# Patient Record
Sex: FEMALE | Race: ASIAN | NOT HISPANIC OR LATINO | URBAN - METROPOLITAN AREA
[De-identification: names, ages, dates, MRNs, and addresses within clinical notes are randomized per-mention and may not be internally consistent; named-entity substitution may affect disease eponyms.]

---

## 2024-01-01 ENCOUNTER — OUTPATIENT (OUTPATIENT)
Dept: OUTPATIENT SERVICES | Facility: HOSPITAL | Age: 64
LOS: 1 days | Discharge: HOME | End: 2024-01-01
Payer: MEDICAID

## 2024-01-01 DIAGNOSIS — Z98.890 OTHER SPECIFIED POSTPROCEDURAL STATES: Chronic | ICD-10-CM

## 2024-01-01 DIAGNOSIS — Z90.710 ACQUIRED ABSENCE OF BOTH CERVIX AND UTERUS: Chronic | ICD-10-CM

## 2024-01-01 DIAGNOSIS — Z87.81 PERSONAL HISTORY OF (HEALED) TRAUMATIC FRACTURE: Chronic | ICD-10-CM

## 2024-01-01 DIAGNOSIS — C34.90 MALIGNANT NEOPLASM OF UNSPECIFIED PART OF UNSPECIFIED BRONCHUS OR LUNG: ICD-10-CM

## 2024-01-01 DIAGNOSIS — Z98.891 HISTORY OF UTERINE SCAR FROM PREVIOUS SURGERY: Chronic | ICD-10-CM

## 2024-01-01 DIAGNOSIS — Z90.721 ACQUIRED ABSENCE OF OVARIES, UNILATERAL: Chronic | ICD-10-CM

## 2024-01-01 LAB
BASOPHILS # BLD AUTO: 0.02 K/UL — SIGNIFICANT CHANGE UP (ref 0–0.2)
BASOPHILS NFR BLD AUTO: 0.5 % — SIGNIFICANT CHANGE UP (ref 0–2)
EOSINOPHIL # BLD AUTO: 0.22 K/UL — SIGNIFICANT CHANGE UP (ref 0–0.5)
EOSINOPHIL NFR BLD AUTO: 5.3 % — SIGNIFICANT CHANGE UP (ref 0–6)
HCT VFR BLD CALC: 36.8 % — SIGNIFICANT CHANGE UP (ref 34.5–45)
HGB BLD-MCNC: 11.8 G/DL — SIGNIFICANT CHANGE UP (ref 11.5–15.5)
IMM GRANULOCYTES NFR BLD AUTO: 0.2 % — SIGNIFICANT CHANGE UP (ref 0–0.9)
LYMPHOCYTES # BLD AUTO: 1.37 K/UL — SIGNIFICANT CHANGE UP (ref 1–3.3)
LYMPHOCYTES # BLD AUTO: 32.9 % — SIGNIFICANT CHANGE UP (ref 13–44)
MCHC RBC-ENTMCNC: 27.3 PG — SIGNIFICANT CHANGE UP (ref 27–34)
MCHC RBC-ENTMCNC: 32.1 G/DL — SIGNIFICANT CHANGE UP (ref 32–36)
MCV RBC AUTO: 85.2 FL — SIGNIFICANT CHANGE UP (ref 80–100)
MONOCYTES # BLD AUTO: 0.53 K/UL — SIGNIFICANT CHANGE UP (ref 0–0.9)
MONOCYTES NFR BLD AUTO: 12.7 % — SIGNIFICANT CHANGE UP (ref 2–14)
NEUTROPHILS # BLD AUTO: 2.02 K/UL — SIGNIFICANT CHANGE UP (ref 1.8–7.4)
NEUTROPHILS NFR BLD AUTO: 48.4 % — SIGNIFICANT CHANGE UP (ref 43–77)
NRBC # BLD: 0 /100 WBCS — SIGNIFICANT CHANGE UP (ref 0–0)
NRBC BLD-RTO: 0 /100 WBCS — SIGNIFICANT CHANGE UP (ref 0–0)
PLATELET # BLD AUTO: 178 K/UL — SIGNIFICANT CHANGE UP (ref 150–400)
RBC # BLD: 4.32 M/UL — SIGNIFICANT CHANGE UP (ref 3.8–5.2)
RBC # FLD: 12.7 % — SIGNIFICANT CHANGE UP (ref 10.3–14.5)
WBC # BLD: 4.17 K/UL — SIGNIFICANT CHANGE UP (ref 3.8–10.5)
WBC # FLD AUTO: 4.17 K/UL — SIGNIFICANT CHANGE UP (ref 3.8–10.5)

## 2024-01-01 PROCEDURE — 77290 THER RAD SIMULAJ FIELD CPLX: CPT | Mod: 26

## 2024-01-01 PROCEDURE — 77263 THER RADIOLOGY TX PLNG CPLX: CPT

## 2024-01-01 PROCEDURE — 77334 RADIATION TREATMENT AID(S): CPT | Mod: 26

## 2024-01-01 PROCEDURE — 77280 THER RAD SIMULAJ FIELD SMPL: CPT | Mod: 26

## 2024-01-01 PROCEDURE — 77307 TELETHX ISODOSE PLAN CPLX: CPT | Mod: 26

## 2024-01-01 PROCEDURE — 93010 ELECTROCARDIOGRAM REPORT: CPT

## 2024-01-01 PROCEDURE — 77427 RADIATION TX MANAGEMENT X5: CPT

## 2024-03-21 ENCOUNTER — OUTPATIENT (OUTPATIENT)
Dept: OUTPATIENT SERVICES | Facility: HOSPITAL | Age: 64
LOS: 1 days | Discharge: HOME | End: 2024-03-21
Payer: MEDICAID

## 2024-03-21 DIAGNOSIS — Z98.890 OTHER SPECIFIED POSTPROCEDURAL STATES: Chronic | ICD-10-CM

## 2024-03-21 DIAGNOSIS — Z90.721 ACQUIRED ABSENCE OF OVARIES, UNILATERAL: Chronic | ICD-10-CM

## 2024-03-21 DIAGNOSIS — Z87.81 PERSONAL HISTORY OF (HEALED) TRAUMATIC FRACTURE: Chronic | ICD-10-CM

## 2024-03-22 PROCEDURE — 77334 RADIATION TREATMENT AID(S): CPT | Mod: 26

## 2024-03-22 PROCEDURE — 77290 THER RAD SIMULAJ FIELD CPLX: CPT | Mod: 26

## 2024-03-25 PROCEDURE — 77321 SPECIAL TELETX PORT PLAN: CPT | Mod: 26

## 2024-03-25 PROCEDURE — 77427 RADIATION TX MANAGEMENT X5: CPT

## 2024-03-25 PROCEDURE — 77280 THER RAD SIMULAJ FIELD SMPL: CPT | Mod: 26

## 2024-03-25 PROCEDURE — 77332 RADIATION TREATMENT AID(S): CPT | Mod: 26,59

## 2024-03-25 PROCEDURE — 77334 RADIATION TREATMENT AID(S): CPT | Mod: 26

## 2024-03-29 PROCEDURE — 77427 RADIATION TX MANAGEMENT X5: CPT

## 2024-05-22 PROCEDURE — 77290 THER RAD SIMULAJ FIELD CPLX: CPT | Mod: 26

## 2024-05-22 PROCEDURE — 77263 THER RADIOLOGY TX PLNG CPLX: CPT

## 2024-05-22 PROCEDURE — 77334 RADIATION TREATMENT AID(S): CPT | Mod: 26

## 2024-05-24 PROCEDURE — 77332 RADIATION TREATMENT AID(S): CPT | Mod: 26

## 2024-05-24 PROCEDURE — 77306 TELETHX ISODOSE PLAN SIMPLE: CPT | Mod: 26

## 2024-05-30 PROCEDURE — 77280 THER RAD SIMULAJ FIELD SMPL: CPT | Mod: 26

## 2024-08-06 ENCOUNTER — OUTPATIENT (OUTPATIENT)
Dept: OUTPATIENT SERVICES | Facility: HOSPITAL | Age: 64
LOS: 1 days | Discharge: HOME | End: 2024-08-06

## 2024-08-06 DIAGNOSIS — Z87.81 PERSONAL HISTORY OF (HEALED) TRAUMATIC FRACTURE: Chronic | ICD-10-CM

## 2024-08-06 DIAGNOSIS — Z98.890 OTHER SPECIFIED POSTPROCEDURAL STATES: Chronic | ICD-10-CM

## 2024-08-06 DIAGNOSIS — C34.90 MALIGNANT NEOPLASM OF UNSPECIFIED PART OF UNSPECIFIED BRONCHUS OR LUNG: ICD-10-CM

## 2024-08-06 DIAGNOSIS — Z90.721 ACQUIRED ABSENCE OF OVARIES, UNILATERAL: Chronic | ICD-10-CM

## 2024-08-07 PROBLEM — R91.8 OPACITY OF LUNG ON IMAGING STUDY: Status: ACTIVE | Noted: 2024-08-07

## 2024-08-26 ENCOUNTER — OUTPATIENT (OUTPATIENT)
Dept: OUTPATIENT SERVICES | Facility: HOSPITAL | Age: 64
LOS: 1 days | End: 2024-08-26
Payer: MEDICAID

## 2024-08-26 DIAGNOSIS — Z98.890 OTHER SPECIFIED POSTPROCEDURAL STATES: Chronic | ICD-10-CM

## 2024-08-26 DIAGNOSIS — C79.31 SECONDARY MALIGNANT NEOPLASM OF BRAIN: ICD-10-CM

## 2024-08-26 DIAGNOSIS — Z90.710 ACQUIRED ABSENCE OF BOTH CERVIX AND UTERUS: Chronic | ICD-10-CM

## 2024-08-26 DIAGNOSIS — Z98.891 HISTORY OF UTERINE SCAR FROM PREVIOUS SURGERY: Chronic | ICD-10-CM

## 2024-08-26 DIAGNOSIS — Z87.81 PERSONAL HISTORY OF (HEALED) TRAUMATIC FRACTURE: Chronic | ICD-10-CM

## 2024-08-26 DIAGNOSIS — Z90.721 ACQUIRED ABSENCE OF OVARIES, UNILATERAL: Chronic | ICD-10-CM

## 2024-08-26 DIAGNOSIS — Z00.8 ENCOUNTER FOR OTHER GENERAL EXAMINATION: ICD-10-CM

## 2024-08-26 PROCEDURE — 70553 MRI BRAIN STEM W/O & W/DYE: CPT

## 2024-08-26 PROCEDURE — A9585: CPT

## 2024-09-03 PROCEDURE — 77333 RADIATION TREATMENT AID(S): CPT | Mod: 26

## 2024-09-03 PROCEDURE — 77290 THER RAD SIMULAJ FIELD CPLX: CPT | Mod: 26

## 2024-09-09 PROCEDURE — 77334 RADIATION TREATMENT AID(S): CPT | Mod: 26

## 2024-09-09 PROCEDURE — 77307 TELETHX ISODOSE PLAN CPLX: CPT | Mod: 26

## 2024-09-10 PROCEDURE — 77332 RADIATION TREATMENT AID(S): CPT | Mod: 26

## 2024-09-10 PROCEDURE — 77280 THER RAD SIMULAJ FIELD SMPL: CPT | Mod: 26

## 2024-09-26 LAB
BASOPHILS # BLD AUTO: 0.02 K/UL — SIGNIFICANT CHANGE UP (ref 0–0.2)
BASOPHILS NFR BLD AUTO: 0.4 % — SIGNIFICANT CHANGE UP (ref 0–2)
EOSINOPHIL # BLD AUTO: 0.32 K/UL — SIGNIFICANT CHANGE UP (ref 0–0.5)
EOSINOPHIL NFR BLD AUTO: 5.9 % — SIGNIFICANT CHANGE UP (ref 0–6)
ERYTHROCYTE [SEDIMENTATION RATE] IN BLOOD: 67 MM/HR — HIGH (ref 0–20)
HCT VFR BLD CALC: 35.2 % — SIGNIFICANT CHANGE UP (ref 34.5–45)
HGB BLD-MCNC: 11.3 G/DL — LOW (ref 11.5–15.5)
IMM GRANULOCYTES NFR BLD AUTO: 0 % — SIGNIFICANT CHANGE UP (ref 0–0.9)
LYMPHOCYTES # BLD AUTO: 1.32 K/UL — SIGNIFICANT CHANGE UP (ref 1–3.3)
LYMPHOCYTES # BLD AUTO: 24.4 % — SIGNIFICANT CHANGE UP (ref 13–44)
MCHC RBC-ENTMCNC: 29 PG — SIGNIFICANT CHANGE UP (ref 27–34)
MCHC RBC-ENTMCNC: 32.1 G/DL — SIGNIFICANT CHANGE UP (ref 32–36)
MCV RBC AUTO: 89.5 FL — SIGNIFICANT CHANGE UP (ref 80–100)
MONOCYTES # BLD AUTO: 0.56 K/UL — SIGNIFICANT CHANGE UP (ref 0–0.9)
MONOCYTES NFR BLD AUTO: 10.4 % — SIGNIFICANT CHANGE UP (ref 2–14)
NEUTROPHILS # BLD AUTO: 3.18 K/UL — SIGNIFICANT CHANGE UP (ref 1.8–7.4)
NEUTROPHILS NFR BLD AUTO: 58.9 % — SIGNIFICANT CHANGE UP (ref 43–77)
NRBC # BLD: 0 /100 WBCS — SIGNIFICANT CHANGE UP (ref 0–0)
PLATELET # BLD AUTO: 269 K/UL — SIGNIFICANT CHANGE UP (ref 150–400)
RBC # BLD: 3.9 M/UL — SIGNIFICANT CHANGE UP (ref 3.8–5.2)
RBC # FLD: 12.5 % — SIGNIFICANT CHANGE UP (ref 10.3–14.5)
WBC # BLD: 5.4 K/UL — SIGNIFICANT CHANGE UP (ref 3.8–10.5)
WBC # FLD AUTO: 5.4 K/UL — SIGNIFICANT CHANGE UP (ref 3.8–10.5)

## 2024-10-01 ENCOUNTER — OUTPATIENT (OUTPATIENT)
Dept: OUTPATIENT SERVICES | Facility: HOSPITAL | Age: 64
LOS: 1 days | End: 2024-10-01
Payer: MEDICAID

## 2024-10-01 DIAGNOSIS — Z00.8 ENCOUNTER FOR OTHER GENERAL EXAMINATION: ICD-10-CM

## 2024-10-01 DIAGNOSIS — Z87.81 PERSONAL HISTORY OF (HEALED) TRAUMATIC FRACTURE: Chronic | ICD-10-CM

## 2024-10-01 DIAGNOSIS — Z98.891 HISTORY OF UTERINE SCAR FROM PREVIOUS SURGERY: Chronic | ICD-10-CM

## 2024-10-01 DIAGNOSIS — Z90.710 ACQUIRED ABSENCE OF BOTH CERVIX AND UTERUS: Chronic | ICD-10-CM

## 2024-10-01 DIAGNOSIS — C7A.8 OTHER MALIGNANT NEUROENDOCRINE TUMORS: ICD-10-CM

## 2024-10-01 PROCEDURE — 73223 MRI JOINT UPR EXTR W/O&W/DYE: CPT

## 2024-10-01 PROCEDURE — A9585: CPT

## 2024-10-02 DIAGNOSIS — D3A.090 BENIGN CARCINOID TUMOR OF THE BRONCHUS AND LUNG: ICD-10-CM

## 2024-10-10 PROBLEM — M89.9 HUMERUS LESION, LEFT: Status: ACTIVE | Noted: 2024-10-10

## 2024-10-10 PROBLEM — M89.9 BONE LESION: Status: ACTIVE | Noted: 2024-10-10

## 2024-10-11 ENCOUNTER — APPOINTMENT (OUTPATIENT)
Dept: ORTHOPEDIC SURGERY | Facility: CLINIC | Age: 64
End: 2024-10-11
Payer: MEDICAID

## 2024-10-11 DIAGNOSIS — M89.9 DISORDER OF BONE, UNSPECIFIED: ICD-10-CM

## 2024-10-11 PROCEDURE — 73060 X-RAY EXAM OF HUMERUS: CPT

## 2024-10-11 PROCEDURE — 99203 OFFICE O/P NEW LOW 30 MIN: CPT

## 2024-10-18 ENCOUNTER — NON-APPOINTMENT (OUTPATIENT)
Age: 64
End: 2024-10-18

## 2024-10-19 ENCOUNTER — EMERGENCY (EMERGENCY)
Facility: HOSPITAL | Age: 64
LOS: 1 days | Discharge: HOME | End: 2024-10-19
Attending: EMERGENCY MEDICINE | Admitting: EMERGENCY MEDICINE
Payer: MEDICAID

## 2024-10-19 VITALS
RESPIRATION RATE: 17 BRPM | TEMPERATURE: 99 F | DIASTOLIC BLOOD PRESSURE: 74 MMHG | HEART RATE: 82 BPM | SYSTOLIC BLOOD PRESSURE: 137 MMHG | OXYGEN SATURATION: 100 %

## 2024-10-19 VITALS
SYSTOLIC BLOOD PRESSURE: 119 MMHG | WEIGHT: 100.09 LBS | HEART RATE: 83 BPM | DIASTOLIC BLOOD PRESSURE: 75 MMHG | TEMPERATURE: 98 F | HEIGHT: 57 IN | RESPIRATION RATE: 18 BRPM | OXYGEN SATURATION: 96 %

## 2024-10-19 DIAGNOSIS — Z90.710 ACQUIRED ABSENCE OF BOTH CERVIX AND UTERUS: Chronic | ICD-10-CM

## 2024-10-19 DIAGNOSIS — Z87.81 PERSONAL HISTORY OF (HEALED) TRAUMATIC FRACTURE: Chronic | ICD-10-CM

## 2024-10-19 DIAGNOSIS — Z98.891 HISTORY OF UTERINE SCAR FROM PREVIOUS SURGERY: Chronic | ICD-10-CM

## 2024-10-19 DIAGNOSIS — Z90.721 ACQUIRED ABSENCE OF OVARIES, UNILATERAL: Chronic | ICD-10-CM

## 2024-10-19 DIAGNOSIS — Z98.890 OTHER SPECIFIED POSTPROCEDURAL STATES: Chronic | ICD-10-CM

## 2024-10-19 LAB
ALBUMIN SERPL ELPH-MCNC: 3.7 G/DL — SIGNIFICANT CHANGE UP (ref 3.3–5)
ALP SERPL-CCNC: 79 U/L — SIGNIFICANT CHANGE UP (ref 40–120)
ALT FLD-CCNC: 13 U/L — SIGNIFICANT CHANGE UP (ref 4–33)
ANION GAP SERPL CALC-SCNC: 15 MMOL/L — HIGH (ref 7–14)
APPEARANCE UR: CLEAR — SIGNIFICANT CHANGE UP
AST SERPL-CCNC: 31 U/L — SIGNIFICANT CHANGE UP (ref 4–32)
BACTERIA # UR AUTO: NEGATIVE /HPF — SIGNIFICANT CHANGE UP
BASOPHILS # BLD AUTO: 0.03 K/UL — SIGNIFICANT CHANGE UP (ref 0–0.2)
BASOPHILS NFR BLD AUTO: 0.4 % — SIGNIFICANT CHANGE UP (ref 0–2)
BILIRUB SERPL-MCNC: 0.3 MG/DL — SIGNIFICANT CHANGE UP (ref 0.2–1.2)
BILIRUB UR-MCNC: NEGATIVE — SIGNIFICANT CHANGE UP
BUN SERPL-MCNC: 6 MG/DL — LOW (ref 7–23)
CALCIUM SERPL-MCNC: 9.1 MG/DL — SIGNIFICANT CHANGE UP (ref 8.4–10.5)
CAST: 0 /LPF — SIGNIFICANT CHANGE UP (ref 0–4)
CHLORIDE SERPL-SCNC: 101 MMOL/L — SIGNIFICANT CHANGE UP (ref 98–107)
CO2 SERPL-SCNC: 23 MMOL/L — SIGNIFICANT CHANGE UP (ref 22–31)
COLOR SPEC: SIGNIFICANT CHANGE UP
CREAT SERPL-MCNC: 0.49 MG/DL — LOW (ref 0.5–1.3)
DIFF PNL FLD: ABNORMAL
EGFR: 105 ML/MIN/1.73M2 — SIGNIFICANT CHANGE UP
EOSINOPHIL # BLD AUTO: 0.3 K/UL — SIGNIFICANT CHANGE UP (ref 0–0.5)
EOSINOPHIL NFR BLD AUTO: 4.3 % — SIGNIFICANT CHANGE UP (ref 0–6)
GLUCOSE SERPL-MCNC: 178 MG/DL — HIGH (ref 70–99)
GLUCOSE UR QL: NEGATIVE MG/DL — SIGNIFICANT CHANGE UP
HCT VFR BLD CALC: 37.4 % — SIGNIFICANT CHANGE UP (ref 34.5–45)
HGB BLD-MCNC: 12.1 G/DL — SIGNIFICANT CHANGE UP (ref 11.5–15.5)
IANC: 4.75 K/UL — SIGNIFICANT CHANGE UP (ref 1.8–7.4)
IMM GRANULOCYTES NFR BLD AUTO: 0.4 % — SIGNIFICANT CHANGE UP (ref 0–0.9)
KETONES UR-MCNC: NEGATIVE MG/DL — SIGNIFICANT CHANGE UP
LEUKOCYTE ESTERASE UR-ACNC: ABNORMAL
LYMPHOCYTES # BLD AUTO: 1.36 K/UL — SIGNIFICANT CHANGE UP (ref 1–3.3)
LYMPHOCYTES # BLD AUTO: 19.4 % — SIGNIFICANT CHANGE UP (ref 13–44)
MCHC RBC-ENTMCNC: 27.4 PG — SIGNIFICANT CHANGE UP (ref 27–34)
MCHC RBC-ENTMCNC: 32.4 GM/DL — SIGNIFICANT CHANGE UP (ref 32–36)
MCV RBC AUTO: 84.8 FL — SIGNIFICANT CHANGE UP (ref 80–100)
MONOCYTES # BLD AUTO: 0.54 K/UL — SIGNIFICANT CHANGE UP (ref 0–0.9)
MONOCYTES NFR BLD AUTO: 7.7 % — SIGNIFICANT CHANGE UP (ref 2–14)
NEUTROPHILS # BLD AUTO: 4.75 K/UL — SIGNIFICANT CHANGE UP (ref 1.8–7.4)
NEUTROPHILS NFR BLD AUTO: 67.8 % — SIGNIFICANT CHANGE UP (ref 43–77)
NITRITE UR-MCNC: POSITIVE
NRBC # BLD: 0 /100 WBCS — SIGNIFICANT CHANGE UP (ref 0–0)
NRBC # FLD: 0 K/UL — SIGNIFICANT CHANGE UP (ref 0–0)
PH UR: 6.5 — SIGNIFICANT CHANGE UP (ref 5–8)
PLATELET # BLD AUTO: 187 K/UL — SIGNIFICANT CHANGE UP (ref 150–400)
POTASSIUM SERPL-MCNC: 3.9 MMOL/L — SIGNIFICANT CHANGE UP (ref 3.5–5.3)
POTASSIUM SERPL-SCNC: 3.9 MMOL/L — SIGNIFICANT CHANGE UP (ref 3.5–5.3)
PROT SERPL-MCNC: 7.6 G/DL — SIGNIFICANT CHANGE UP (ref 6–8.3)
PROT UR-MCNC: SIGNIFICANT CHANGE UP MG/DL
RBC # BLD: 4.41 M/UL — SIGNIFICANT CHANGE UP (ref 3.8–5.2)
RBC # FLD: 12.4 % — SIGNIFICANT CHANGE UP (ref 10.3–14.5)
RBC CASTS # UR COMP ASSIST: 3 /HPF — SIGNIFICANT CHANGE UP (ref 0–4)
REVIEW: SIGNIFICANT CHANGE UP
SODIUM SERPL-SCNC: 139 MMOL/L — SIGNIFICANT CHANGE UP (ref 135–145)
SP GR SPEC: 1.01 — SIGNIFICANT CHANGE UP (ref 1–1.03)
SQUAMOUS # UR AUTO: 1 /HPF — SIGNIFICANT CHANGE UP (ref 0–5)
UROBILINOGEN FLD QL: 1 MG/DL — SIGNIFICANT CHANGE UP (ref 0.2–1)
WBC # BLD: 7.01 K/UL — SIGNIFICANT CHANGE UP (ref 3.8–10.5)
WBC # FLD AUTO: 7.01 K/UL — SIGNIFICANT CHANGE UP (ref 3.8–10.5)
WBC UR QL: 4 /HPF — SIGNIFICANT CHANGE UP (ref 0–5)

## 2024-10-19 PROCEDURE — 93010 ELECTROCARDIOGRAM REPORT: CPT

## 2024-10-19 PROCEDURE — 99284 EMERGENCY DEPT VISIT MOD MDM: CPT

## 2024-10-19 RX ORDER — CEFDINIR 250 MG/5ML
1 POWDER, FOR SUSPENSION ORAL
Qty: 20 | Refills: 0
Start: 2024-10-19 | End: 2024-10-28

## 2024-10-19 RX ORDER — CEFPODOXIME PROXETIL 50 MG/5 ML
1 SUSPENSION, RECONSTITUTED, ORAL (ML) ORAL
Qty: 20 | Refills: 0
Start: 2024-10-19 | End: 2024-10-28

## 2024-10-19 RX ORDER — CEFTRIAXONE SODIUM 1 G
1000 VIAL (EA) INJECTION ONCE
Refills: 0 | Status: COMPLETED | OUTPATIENT
Start: 2024-10-19 | End: 2024-10-19

## 2024-10-19 RX ADMIN — Medication 100 MILLIGRAM(S): at 16:24

## 2024-10-19 NOTE — ED PROVIDER NOTE - ATTENDING CONTRIBUTION TO CARE
I performed a face-to-face evaluation of the patient and performed a history and physical examination. I agree with the history and physical examination. If this was a PA visit, I personally saw the patient with the PA and performed a substantive portion of the visit including all aspects of the medical decision making.    Ronal: Metasatic lung CA. Labs 10 days ago showed not neutropenic, and had UA w/ many WBCs and bacteria. Last night, developed dysuria. Today, F (gets daily F as part of cancer). No other localizing S/Sx. No CVAT. Check WBC count. Check UA/Cx. Likely dc on PO Abx. Doesn't have indwelling line.

## 2024-10-19 NOTE — ED ADULT NURSE NOTE - OBJECTIVE STATEMENT
pt received to rm 1  , a&ox4 , ambulatory , phx of  DM 2 HTN and lung CA with mets , p/w burning with urination and fever since last night  . Pt breathing even and unlabored on room air.  Denies chills, cough, SOB, chest pain, palpitations, dizziness, nausea, vomiting, diarrhea, constipation, numbness, tingling. IV placed. Labs collected and sent. EKG in chart. pt educated on fall precautions and confirms understanding via teach back method. Stretcher locked in lowest position with siderails up x2. Call bell and personal items within reach.

## 2024-10-19 NOTE — ED PROVIDER NOTE - OBJECTIVE STATEMENT
64yo female with hx of lung cancer with diffuse mets, currently on chemo comes into the ED for eval of dysuria since last night. She reports she had blood work done 10 days ago which showed bacteria in urine however she was not having any symptoms. She has been having fevers intermittently for the past 2 weeks but felt it could be secondary to her cancer treatment. Currently she denies any abd pain, flank pain, chest pain, nausea, or vomiting.

## 2024-10-19 NOTE — ED PROVIDER NOTE - PATIENT PORTAL LINK FT
You can access the FollowMyHealth Patient Portal offered by [facility] by registering at the following website: [email or web address]. By joining [employer]’s FollowMyHealth portal, you will also be able to view your health information using other applications (apps) compatible with our system.

## 2024-10-19 NOTE — ED ADULT NURSE NOTE - NSFALLUNIVINTERV_ED_ALL_ED
Bed/Stretcher in lowest position, wheels locked, appropriate side rails in place/Call bell, personal items and telephone in reach/Instruct patient to call for assistance before getting out of bed/chair/stretcher/Non-slip footwear applied when patient is off stretcher/[place] to call system/Physically safe environment - no spills, clutter or unnecessary equipment/Purposeful proactive rounding/Room/bathroom lighting operational, light cord in reach

## 2024-10-19 NOTE — ED ADULT NURSE REASSESSMENT NOTE - SPO2 (%)
Ebony Jo's goals for this visit include:   Chief Complaint   Patient presents with     Derm Problem     Ebony is here today for skin check, has concerns of black spots on her upper body. States starts as a pimple but takes a long time to go away.        She requests these members of her care team be copied on today's visit information:     PCP: Desire Dumont    Referring Provider:  No referring provider defined for this encounter.    There were no vitals taken for this visit.    Do you need any medication refills at today's visit? No    Terra Mar LPN       100

## 2024-10-19 NOTE — ED PROVIDER NOTE - PROGRESS NOTE DETAILS
Sandor Voss PGY3:  Reevaluated Pt by bedside. Updated Pt on labs. Answered all questions. Reviewed return precautions. Pt expressed understanding and feels comfortable with plan for DC

## 2024-10-19 NOTE — ED ADULT TRIAGE NOTE - CHIEF COMPLAINT QUOTE
patient states " I am having fever and pain on urination since last night " cancer lung with mets. taking chemo tablets daily. h/o Dm, HTN, FS  202

## 2024-10-19 NOTE — ED ADULT NURSE REASSESSMENT NOTE - NS ED NURSE REASSESS COMMENT FT1
Report received from break HANSEL Beebe. Pt is A&Ox4, appears comfortable in stretcher. endorses dizziness. denies chest pain, SOB, headache, n/v. breathing is even and unlabored. cardiac monitoring maintained - NSR. IV intact. Stretcher set in lowest position, call bell within reach, safety maintained.

## 2024-10-19 NOTE — ED PROVIDER NOTE - NSFOLLOWUPINSTRUCTIONS_ED_ALL_ED_FT
Please follow up with your primary care doctor.     You were sent antibiotics to your pharmacy. Please finish the full course.     Urinary Tract Infection    A urinary tract infection (UTI) is an infection of any part of the urinary tract, which includes the kidneys, ureters, bladder, and urethra. Risk factors include ignoring your need to urinate, wiping back to front if female, being an uncircumcised male, and having diabetes or a weak immune system. Symptoms include frequent urination, pain or burning with urination, foul smelling urine, cloudy urine, pain in the lower abdomen, blood in the urine, and fever. If you were prescribed an antibiotic medicine, take it as told by your health care provider. Do not stop taking the antibiotic even if you start to feel better.    SEEK IMMEDIATE MEDICAL CARE IF YOU HAVE ANY OF THE FOLLOWING SYMPTOMS: severe back or abdominal pain, fever, inability to keep fluids or medicine down, dizziness/lightheadedness, or a change in mental status.

## 2024-10-19 NOTE — ED PROVIDER NOTE - CLINICAL SUMMARY MEDICAL DECISION MAKING FREE TEXT BOX
Ronal: Metasatic lung CA. Labs 10 days ago showed not neutropenic, and had UA w/ many WBCs and bacteria. Last night, developed dysuria. Today, F (gets daily F as part of cancer). No other localizing S/Sx. No CVAT. Check WBC count. Check UA/Cx. Likely dc on PO Abx. Doesn't have indwelling line.

## 2024-10-19 NOTE — ED PROVIDER NOTE - NSICDXPASTSURGICALHX_GEN_ALL_CORE_FT
PAST SURGICAL HISTORY:  H/O  section     H/O fracture of leg left    H/O: hysterectomy     History of lung surgery 09/2021 x2    History of thyroid surgery partial rt thyroid ca    S/P left oophorectomy

## 2024-10-19 NOTE — ED PROVIDER NOTE - NSICDXPASTMEDICALHX_GEN_ALL_CORE_FT
PAST MEDICAL HISTORY:  Diabetes mellitus     Former smoker 1/2 ppd x 1yr    Hyperlipidemia     Hypertension     Lung cancer, primary, with metastasis from lung to other site     Lung nodules     Thyroid cancer

## 2024-10-21 LAB
CULTURE RESULTS: SIGNIFICANT CHANGE UP
SPECIMEN SOURCE: SIGNIFICANT CHANGE UP

## 2024-10-29 ENCOUNTER — APPOINTMENT (OUTPATIENT)
Dept: MRI IMAGING | Facility: CLINIC | Age: 64
End: 2024-10-29
Payer: MEDICAID

## 2024-10-29 PROCEDURE — A9585: CPT

## 2024-10-29 PROCEDURE — 70553 MRI BRAIN STEM W/O & W/DYE: CPT

## 2024-10-30 ENCOUNTER — RESULT REVIEW (OUTPATIENT)
Age: 64
End: 2024-10-30

## 2024-10-30 ENCOUNTER — APPOINTMENT (OUTPATIENT)
Dept: RADIATION ONCOLOGY | Facility: CLINIC | Age: 64
End: 2024-10-30
Payer: MEDICAID

## 2024-10-30 ENCOUNTER — APPOINTMENT (OUTPATIENT)
Dept: HEMATOLOGY ONCOLOGY | Facility: CLINIC | Age: 64
End: 2024-10-30
Payer: MEDICAID

## 2024-10-30 VITALS
SYSTOLIC BLOOD PRESSURE: 117 MMHG | RESPIRATION RATE: 15 BRPM | WEIGHT: 99.21 LBS | HEART RATE: 84 BPM | TEMPERATURE: 98.3 F | DIASTOLIC BLOOD PRESSURE: 64 MMHG | OXYGEN SATURATION: 93 % | BODY MASS INDEX: 20.74 KG/M2

## 2024-10-30 DIAGNOSIS — C7A.8 OTHER MALIGNANT NEUROENDOCRINE TUMORS: ICD-10-CM

## 2024-10-30 DIAGNOSIS — R19.7 DIARRHEA, UNSPECIFIED: ICD-10-CM

## 2024-10-30 DIAGNOSIS — K12.30 ORAL MUCOSITIS (ULCERATIVE), UNSPECIFIED: ICD-10-CM

## 2024-10-30 DIAGNOSIS — G89.3 NEOPLASM RELATED PAIN (ACUTE) (CHRONIC): ICD-10-CM

## 2024-10-30 PROCEDURE — 99215 OFFICE O/P EST HI 40 MIN: CPT

## 2024-10-30 PROCEDURE — 99214 OFFICE O/P EST MOD 30 MIN: CPT

## 2024-10-30 RX ORDER — OXYCODONE 10 MG/1
10 TABLET ORAL EVERY 4 HOURS
Qty: 264 | Refills: 0 | Status: ACTIVE | COMMUNITY
Start: 2024-10-30 | End: 1900-01-01

## 2024-10-30 RX ORDER — DIPHENHYDRAMINE HYDROCHLORIDE AND LIDOCAINE HYDROCHLORIDE AND ALUMINUM HYDROXIDE AND MAGNESIUM HYDRO
KIT
Qty: 900 | Refills: 3 | Status: ACTIVE | COMMUNITY
Start: 2024-10-30 | End: 1900-01-01

## 2024-11-01 LAB
ALBUMIN SERPL ELPH-MCNC: 4.1 G/DL
ALP BLD-CCNC: 76 U/L
ALT SERPL-CCNC: 16 U/L
ANION GAP SERPL CALC-SCNC: 16 MMOL/L
AST SERPL-CCNC: 33 U/L
BILIRUB SERPL-MCNC: 0.3 MG/DL
BUN SERPL-MCNC: 8 MG/DL
CALCIT SERPL-MCNC: 3.3 PG/ML
CALCIUM SERPL-MCNC: 9.1 MG/DL
CEA SERPL-MCNC: 2.7 NG/ML
CHLORIDE SERPL-SCNC: 101 MMOL/L
CO2 SERPL-SCNC: 26 MMOL/L
CREAT SERPL-MCNC: 0.69 MG/DL
EGFR: 97 ML/MIN/1.73M2
GLUCOSE SERPL-MCNC: 164 MG/DL
LDH SERPL-CCNC: 252 U/L
MAGNESIUM SERPL-MCNC: 2.3 MG/DL
POTASSIUM SERPL-SCNC: 4.2 MMOL/L
PROT SERPL-MCNC: 7.3 G/DL
SODIUM SERPL-SCNC: 143 MMOL/L
TSH SERPL-ACNC: 1.28 UIU/ML

## 2024-11-04 ENCOUNTER — APPOINTMENT (OUTPATIENT)
Dept: HEMATOLOGY ONCOLOGY | Facility: CLINIC | Age: 64
End: 2024-11-04

## 2024-11-04 VITALS
RESPIRATION RATE: 16 BRPM | HEIGHT: 58 IN | DIASTOLIC BLOOD PRESSURE: 77 MMHG | BODY MASS INDEX: 20.82 KG/M2 | WEIGHT: 99.21 LBS | TEMPERATURE: 97.6 F | HEART RATE: 94 BPM | OXYGEN SATURATION: 94 % | SYSTOLIC BLOOD PRESSURE: 158 MMHG

## 2024-11-04 PROCEDURE — 99215 OFFICE O/P EST HI 40 MIN: CPT

## 2024-11-04 PROCEDURE — G2211 COMPLEX E/M VISIT ADD ON: CPT | Mod: NC

## 2024-11-04 RX ORDER — DEXAMETHASONE 4 MG/1
4 TABLET ORAL
Qty: 14 | Refills: 2 | Status: ACTIVE | COMMUNITY
Start: 2024-11-04 | End: 1900-01-01

## 2024-11-04 RX ORDER — FLUCONAZOLE 100 MG/1
100 TABLET ORAL
Qty: 6 | Refills: 0 | Status: ACTIVE | COMMUNITY
Start: 2024-11-04 | End: 1900-01-01

## 2024-11-05 ENCOUNTER — APPOINTMENT (OUTPATIENT)
Dept: NEUROLOGY | Facility: CLINIC | Age: 64
End: 2024-11-05
Payer: MEDICAID

## 2024-11-05 VITALS
OXYGEN SATURATION: 98 % | HEART RATE: 90 BPM | SYSTOLIC BLOOD PRESSURE: 128 MMHG | RESPIRATION RATE: 18 BRPM | DIASTOLIC BLOOD PRESSURE: 74 MMHG

## 2024-11-05 DIAGNOSIS — C79.31 SECONDARY MALIGNANT NEOPLASM OF BRAIN: ICD-10-CM

## 2024-11-05 PROCEDURE — 99214 OFFICE O/P EST MOD 30 MIN: CPT

## 2024-11-06 LAB — SEROTONIN SERUM: 294 NG/ML

## 2024-11-07 ENCOUNTER — APPOINTMENT (OUTPATIENT)
Dept: INFUSION THERAPY | Facility: HOSPITAL | Age: 64
End: 2024-11-07

## 2024-11-08 RX ORDER — DEXAMETHASONE 4 MG/1
4 TABLET ORAL
Qty: 30 | Refills: 1 | Status: ACTIVE | COMMUNITY
Start: 2024-11-08 | End: 1900-01-01

## 2024-11-13 ENCOUNTER — NON-APPOINTMENT (OUTPATIENT)
Age: 64
End: 2024-11-13

## 2024-11-15 RX ORDER — MORPHINE SULFATE 15 MG/1
15 TABLET, FILM COATED, EXTENDED RELEASE ORAL
Qty: 60 | Refills: 0 | Status: ACTIVE | COMMUNITY
Start: 2024-11-13 | End: 1900-01-01

## 2024-11-19 ENCOUNTER — APPOINTMENT (OUTPATIENT)
Dept: HEMATOLOGY ONCOLOGY | Facility: CLINIC | Age: 64
End: 2024-11-19
Payer: MEDICAID

## 2024-11-19 VITALS
RESPIRATION RATE: 17 BRPM | OXYGEN SATURATION: 94 % | HEART RATE: 75 BPM | SYSTOLIC BLOOD PRESSURE: 117 MMHG | TEMPERATURE: 98.1 F | BODY MASS INDEX: 20.78 KG/M2 | WEIGHT: 99.43 LBS | DIASTOLIC BLOOD PRESSURE: 72 MMHG

## 2024-11-19 DIAGNOSIS — C7A.8 OTHER MALIGNANT NEUROENDOCRINE TUMORS: ICD-10-CM

## 2024-11-19 PROCEDURE — G2211 COMPLEX E/M VISIT ADD ON: CPT | Mod: NC

## 2024-11-19 PROCEDURE — 99215 OFFICE O/P EST HI 40 MIN: CPT

## 2024-11-19 RX ORDER — DEXAMETHASONE 4 MG/1
4 TABLET ORAL
Qty: 60 | Refills: 0 | Status: ACTIVE | COMMUNITY
Start: 2024-11-19 | End: 1900-01-01

## 2024-11-19 RX ORDER — OXYCODONE HYDROCHLORIDE 30 MG/1
30 TABLET, FILM COATED, EXTENDED RELEASE ORAL
Qty: 60 | Refills: 0 | Status: ACTIVE | COMMUNITY
Start: 2024-11-19 | End: 1900-01-01

## 2024-11-19 RX ORDER — SULFAMETHOXAZOLE AND TRIMETHOPRIM 800; 160 MG/1; MG/1
800-160 TABLET ORAL DAILY
Qty: 30 | Refills: 0 | Status: ACTIVE | COMMUNITY
Start: 2024-11-19 | End: 1900-01-01

## 2024-11-19 RX ORDER — ALPRAZOLAM 0.5 MG/1
0.5 TABLET ORAL
Qty: 90 | Refills: 0 | Status: ACTIVE | COMMUNITY
Start: 2024-11-19 | End: 1900-01-01

## 2024-11-21 ENCOUNTER — NON-APPOINTMENT (OUTPATIENT)
Age: 64
End: 2024-11-21

## 2024-12-06 RX ORDER — OXYCODONE HYDROCHLORIDE 20 MG/1
20 TABLET, FILM COATED, EXTENDED RELEASE ORAL
Qty: 60 | Refills: 0 | Status: ACTIVE | COMMUNITY
Start: 2024-12-06 | End: 1900-01-01

## 2025-01-01 ENCOUNTER — NON-APPOINTMENT (OUTPATIENT)
Age: 65
End: 2025-01-01

## 2025-01-01 ENCOUNTER — APPOINTMENT (OUTPATIENT)
Dept: RADIATION ONCOLOGY | Facility: CLINIC | Age: 65
End: 2025-01-01

## 2025-01-01 ENCOUNTER — APPOINTMENT (OUTPATIENT)
Dept: MRI IMAGING | Facility: CLINIC | Age: 65
End: 2025-01-01

## 2025-01-01 ENCOUNTER — INPATIENT (INPATIENT)
Facility: HOSPITAL | Age: 65
LOS: 8 days | End: 2025-02-04
Attending: STUDENT IN AN ORGANIZED HEALTH CARE EDUCATION/TRAINING PROGRAM | Admitting: STUDENT IN AN ORGANIZED HEALTH CARE EDUCATION/TRAINING PROGRAM
Payer: MEDICAID

## 2025-01-01 VITALS
DIASTOLIC BLOOD PRESSURE: 86 MMHG | HEART RATE: 114 BPM | OXYGEN SATURATION: 94 % | WEIGHT: 89.95 LBS | TEMPERATURE: 98 F | RESPIRATION RATE: 18 BRPM | SYSTOLIC BLOOD PRESSURE: 142 MMHG

## 2025-01-01 VITALS — HEART RATE: 96 BPM | OXYGEN SATURATION: 80 %

## 2025-01-01 DIAGNOSIS — J96.01 ACUTE RESPIRATORY FAILURE WITH HYPOXIA: ICD-10-CM

## 2025-01-01 DIAGNOSIS — K59.00 CONSTIPATION, UNSPECIFIED: ICD-10-CM

## 2025-01-01 DIAGNOSIS — Z51.5 ENCOUNTER FOR PALLIATIVE CARE: ICD-10-CM

## 2025-01-01 DIAGNOSIS — Z90.721 ACQUIRED ABSENCE OF OVARIES, UNILATERAL: Chronic | ICD-10-CM

## 2025-01-01 DIAGNOSIS — R73.9 HYPERGLYCEMIA, UNSPECIFIED: ICD-10-CM

## 2025-01-01 DIAGNOSIS — Z98.891 HISTORY OF UTERINE SCAR FROM PREVIOUS SURGERY: Chronic | ICD-10-CM

## 2025-01-01 DIAGNOSIS — Z98.890 OTHER SPECIFIED POSTPROCEDURAL STATES: Chronic | ICD-10-CM

## 2025-01-01 DIAGNOSIS — B37.0 CANDIDAL STOMATITIS: ICD-10-CM

## 2025-01-01 DIAGNOSIS — C7A.8 OTHER MALIGNANT NEUROENDOCRINE TUMORS: ICD-10-CM

## 2025-01-01 DIAGNOSIS — R06.00 DYSPNEA, UNSPECIFIED: ICD-10-CM

## 2025-01-01 DIAGNOSIS — I10 ESSENTIAL (PRIMARY) HYPERTENSION: ICD-10-CM

## 2025-01-01 DIAGNOSIS — Z71.89 OTHER SPECIFIED COUNSELING: ICD-10-CM
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
## 2025-01-01 DIAGNOSIS — I48.91 UNSPECIFIED ATRIAL FIBRILLATION: ICD-10-CM

## 2025-01-01 DIAGNOSIS — Z87.81 PERSONAL HISTORY OF (HEALED) TRAUMATIC FRACTURE: Chronic | ICD-10-CM

## 2025-01-01 DIAGNOSIS — G89.3 NEOPLASM RELATED PAIN (ACUTE) (CHRONIC): ICD-10-CM

## 2025-01-01 DIAGNOSIS — R07.9 CHEST PAIN, UNSPECIFIED: ICD-10-CM

## 2025-01-01 DIAGNOSIS — E78.5 HYPERLIPIDEMIA, UNSPECIFIED: ICD-10-CM

## 2025-01-01 DIAGNOSIS — Z90.710 ACQUIRED ABSENCE OF BOTH CERVIX AND UTERUS: Chronic | ICD-10-CM

## 2025-01-01 DIAGNOSIS — R79.89 OTHER SPECIFIED ABNORMAL FINDINGS OF BLOOD CHEMISTRY: ICD-10-CM

## 2025-01-01 DIAGNOSIS — E43 UNSPECIFIED SEVERE PROTEIN-CALORIE MALNUTRITION: ICD-10-CM

## 2025-01-01 DIAGNOSIS — Z29.9 ENCOUNTER FOR PROPHYLACTIC MEASURES, UNSPECIFIED: ICD-10-CM

## 2025-01-01 LAB
24R-OH-CALCIDIOL SERPL-MCNC: 19.3 NG/ML — LOW (ref 30–80)
A1C WITH ESTIMATED AVERAGE GLUCOSE RESULT: 7.7 % — HIGH (ref 4–5.6)
ADD ON TEST-SPECIMEN IN LAB: SIGNIFICANT CHANGE UP
ALBUMIN SERPL ELPH-MCNC: 2.5 G/DL — LOW (ref 3.3–5)
ALBUMIN SERPL ELPH-MCNC: 2.7 G/DL — LOW (ref 3.3–5)
ALBUMIN SERPL ELPH-MCNC: 2.9 G/DL — LOW (ref 3.3–5)
ALBUMIN SERPL ELPH-MCNC: 3 G/DL — LOW (ref 3.3–5)
ALBUMIN SERPL ELPH-MCNC: 3 G/DL — LOW (ref 3.3–5)
ALP SERPL-CCNC: 105 U/L — SIGNIFICANT CHANGE UP (ref 40–120)
ALP SERPL-CCNC: 124 U/L — HIGH (ref 40–120)
ALP SERPL-CCNC: 67 U/L — SIGNIFICANT CHANGE UP (ref 40–120)
ALP SERPL-CCNC: 70 U/L — SIGNIFICANT CHANGE UP (ref 40–120)
ALP SERPL-CCNC: 71 U/L — SIGNIFICANT CHANGE UP (ref 40–120)
ALP SERPL-CCNC: 74 U/L — SIGNIFICANT CHANGE UP (ref 40–120)
ALP SERPL-CCNC: 76 U/L — SIGNIFICANT CHANGE UP (ref 40–120)
ALP SERPL-CCNC: 85 U/L — SIGNIFICANT CHANGE UP (ref 40–120)
ALP SERPL-CCNC: 89 U/L — SIGNIFICANT CHANGE UP (ref 40–120)
ALT FLD-CCNC: 18 U/L — SIGNIFICANT CHANGE UP (ref 4–33)
ALT FLD-CCNC: 18 U/L — SIGNIFICANT CHANGE UP (ref 4–33)
ALT FLD-CCNC: 19 U/L — SIGNIFICANT CHANGE UP (ref 4–33)
ALT FLD-CCNC: 22 U/L — SIGNIFICANT CHANGE UP (ref 4–33)
ALT FLD-CCNC: 23 U/L — SIGNIFICANT CHANGE UP (ref 4–33)
ALT FLD-CCNC: 26 U/L — SIGNIFICANT CHANGE UP (ref 4–33)
ANION GAP SERPL CALC-SCNC: 10 MMOL/L — SIGNIFICANT CHANGE UP (ref 7–14)
ANION GAP SERPL CALC-SCNC: 11 MMOL/L — SIGNIFICANT CHANGE UP (ref 7–14)
ANION GAP SERPL CALC-SCNC: 13 MMOL/L — SIGNIFICANT CHANGE UP (ref 7–14)
ANION GAP SERPL CALC-SCNC: 13 MMOL/L — SIGNIFICANT CHANGE UP (ref 7–14)
ANION GAP SERPL CALC-SCNC: 14 MMOL/L — SIGNIFICANT CHANGE UP (ref 7–14)
ANION GAP SERPL CALC-SCNC: 14 MMOL/L — SIGNIFICANT CHANGE UP (ref 7–14)
ANION GAP SERPL CALC-SCNC: 15 MMOL/L — HIGH (ref 7–14)
ANION GAP SERPL CALC-SCNC: 15 MMOL/L — HIGH (ref 7–14)
ANION GAP SERPL CALC-SCNC: 17 MMOL/L — HIGH (ref 7–14)
ANION GAP SERPL CALC-SCNC: 18 MMOL/L — HIGH (ref 7–14)
ANISOCYTOSIS BLD QL: SLIGHT — SIGNIFICANT CHANGE UP
ANISOCYTOSIS BLD QL: SLIGHT — SIGNIFICANT CHANGE UP
APTT BLD: 24 SEC — LOW (ref 24.5–35.6)
APTT BLD: 26.9 SEC — SIGNIFICANT CHANGE UP (ref 24.5–35.6)
APTT BLD: 51.5 SEC — SIGNIFICANT CHANGE UP (ref 24.5–35.6)
AST SERPL-CCNC: 18 U/L — SIGNIFICANT CHANGE UP (ref 4–32)
AST SERPL-CCNC: 18 U/L — SIGNIFICANT CHANGE UP (ref 4–32)
AST SERPL-CCNC: 19 U/L — SIGNIFICANT CHANGE UP (ref 4–32)
AST SERPL-CCNC: 23 U/L — SIGNIFICANT CHANGE UP (ref 4–32)
AST SERPL-CCNC: 28 U/L — SIGNIFICANT CHANGE UP (ref 4–32)
AST SERPL-CCNC: 33 U/L — HIGH (ref 4–32)
AST SERPL-CCNC: 34 U/L — HIGH (ref 4–32)
AST SERPL-CCNC: 34 U/L — HIGH (ref 4–32)
AST SERPL-CCNC: 38 U/L — HIGH (ref 4–32)
BASOPHILS # BLD AUTO: 0 K/UL — SIGNIFICANT CHANGE UP (ref 0–0.2)
BASOPHILS # BLD AUTO: 0 K/UL — SIGNIFICANT CHANGE UP (ref 0–0.2)
BASOPHILS # BLD AUTO: 0.02 K/UL — SIGNIFICANT CHANGE UP (ref 0–0.2)
BASOPHILS # BLD AUTO: 0.05 K/UL — SIGNIFICANT CHANGE UP (ref 0–0.2)
BASOPHILS NFR BLD AUTO: 0 % — SIGNIFICANT CHANGE UP (ref 0–2)
BASOPHILS NFR BLD AUTO: 0 % — SIGNIFICANT CHANGE UP (ref 0–2)
BASOPHILS NFR BLD AUTO: 0.1 % — SIGNIFICANT CHANGE UP (ref 0–2)
BASOPHILS NFR BLD AUTO: 0.2 % — SIGNIFICANT CHANGE UP (ref 0–2)
BILIRUB SERPL-MCNC: 0.6 MG/DL — SIGNIFICANT CHANGE UP (ref 0.2–1.2)
BILIRUB SERPL-MCNC: 0.7 MG/DL — SIGNIFICANT CHANGE UP (ref 0.2–1.2)
BILIRUB SERPL-MCNC: 0.8 MG/DL — SIGNIFICANT CHANGE UP (ref 0.2–1.2)
BILIRUB SERPL-MCNC: 0.9 MG/DL — SIGNIFICANT CHANGE UP (ref 0.2–1.2)
BILIRUB SERPL-MCNC: 1 MG/DL — SIGNIFICANT CHANGE UP (ref 0.2–1.2)
BLD GP AB SCN SERPL QL: NEGATIVE — SIGNIFICANT CHANGE UP
BLD GP AB SCN SERPL QL: NEGATIVE — SIGNIFICANT CHANGE UP
BLOOD GAS VENOUS COMPREHENSIVE RESULT: SIGNIFICANT CHANGE UP
BUN SERPL-MCNC: 13 MG/DL — SIGNIFICANT CHANGE UP (ref 7–23)
BUN SERPL-MCNC: 18 MG/DL — SIGNIFICANT CHANGE UP (ref 7–23)
BUN SERPL-MCNC: 28 MG/DL — HIGH (ref 7–23)
BUN SERPL-MCNC: 30 MG/DL — HIGH (ref 7–23)
BUN SERPL-MCNC: 33 MG/DL — HIGH (ref 7–23)
BUN SERPL-MCNC: 6 MG/DL — LOW (ref 7–23)
BUN SERPL-MCNC: 7 MG/DL — SIGNIFICANT CHANGE UP (ref 7–23)
BUN SERPL-MCNC: 7 MG/DL — SIGNIFICANT CHANGE UP (ref 7–23)
BUN SERPL-MCNC: 9 MG/DL — SIGNIFICANT CHANGE UP (ref 7–23)
BUN SERPL-MCNC: 9 MG/DL — SIGNIFICANT CHANGE UP (ref 7–23)
BURR CELLS BLD QL SMEAR: PRESENT — SIGNIFICANT CHANGE UP
CALCIUM SERPL-MCNC: 8 MG/DL — LOW (ref 8.4–10.5)
CALCIUM SERPL-MCNC: 8 MG/DL — LOW (ref 8.4–10.5)
CALCIUM SERPL-MCNC: 8.1 MG/DL — LOW (ref 8.4–10.5)
CALCIUM SERPL-MCNC: 8.2 MG/DL — LOW (ref 8.4–10.5)
CALCIUM SERPL-MCNC: 8.3 MG/DL — LOW (ref 8.4–10.5)
CALCIUM SERPL-MCNC: 8.3 MG/DL — LOW (ref 8.4–10.5)
CALCIUM SERPL-MCNC: 8.5 MG/DL — SIGNIFICANT CHANGE UP (ref 8.4–10.5)
CALCIUM SERPL-MCNC: 8.5 MG/DL — SIGNIFICANT CHANGE UP (ref 8.4–10.5)
CHLORIDE SERPL-SCNC: 101 MMOL/L — SIGNIFICANT CHANGE UP (ref 98–107)
CHLORIDE SERPL-SCNC: 102 MMOL/L — SIGNIFICANT CHANGE UP (ref 98–107)
CHLORIDE SERPL-SCNC: 104 MMOL/L — SIGNIFICANT CHANGE UP (ref 98–107)
CHLORIDE SERPL-SCNC: 104 MMOL/L — SIGNIFICANT CHANGE UP (ref 98–107)
CHLORIDE SERPL-SCNC: 105 MMOL/L — SIGNIFICANT CHANGE UP (ref 98–107)
CHLORIDE SERPL-SCNC: 106 MMOL/L — SIGNIFICANT CHANGE UP (ref 98–107)
CHLORIDE SERPL-SCNC: 106 MMOL/L — SIGNIFICANT CHANGE UP (ref 98–107)
CHLORIDE SERPL-SCNC: 108 MMOL/L — HIGH (ref 98–107)
CHLORIDE SERPL-SCNC: 108 MMOL/L — HIGH (ref 98–107)
CHLORIDE SERPL-SCNC: 109 MMOL/L — HIGH (ref 98–107)
CO2 SERPL-SCNC: 17 MMOL/L — LOW (ref 22–31)
CO2 SERPL-SCNC: 19 MMOL/L — LOW (ref 22–31)
CO2 SERPL-SCNC: 20 MMOL/L — LOW (ref 22–31)
CO2 SERPL-SCNC: 22 MMOL/L — SIGNIFICANT CHANGE UP (ref 22–31)
CO2 SERPL-SCNC: 22 MMOL/L — SIGNIFICANT CHANGE UP (ref 22–31)
CO2 SERPL-SCNC: 23 MMOL/L — SIGNIFICANT CHANGE UP (ref 22–31)
CO2 SERPL-SCNC: 25 MMOL/L — SIGNIFICANT CHANGE UP (ref 22–31)
CO2 SERPL-SCNC: 29 MMOL/L — SIGNIFICANT CHANGE UP (ref 22–31)
CO2 SERPL-SCNC: 29 MMOL/L — SIGNIFICANT CHANGE UP (ref 22–31)
CO2 SERPL-SCNC: 31 MMOL/L — SIGNIFICANT CHANGE UP (ref 22–31)
CREAT SERPL-MCNC: 0.32 MG/DL — LOW (ref 0.5–1.3)
CREAT SERPL-MCNC: 0.37 MG/DL — LOW (ref 0.5–1.3)
CREAT SERPL-MCNC: 0.41 MG/DL — LOW (ref 0.5–1.3)
CREAT SERPL-MCNC: 0.41 MG/DL — LOW (ref 0.5–1.3)
CREAT SERPL-MCNC: 0.44 MG/DL — LOW (ref 0.5–1.3)
CREAT SERPL-MCNC: 0.48 MG/DL — LOW (ref 0.5–1.3)
CREAT SERPL-MCNC: 0.5 MG/DL — SIGNIFICANT CHANGE UP (ref 0.5–1.3)
CREAT SERPL-MCNC: 0.56 MG/DL — SIGNIFICANT CHANGE UP (ref 0.5–1.3)
CREAT SERPL-MCNC: 0.57 MG/DL — SIGNIFICANT CHANGE UP (ref 0.5–1.3)
CREAT SERPL-MCNC: 0.67 MG/DL — SIGNIFICANT CHANGE UP (ref 0.5–1.3)
CULTURE RESULTS: SIGNIFICANT CHANGE UP
DACRYOCYTES BLD QL SMEAR: SLIGHT — SIGNIFICANT CHANGE UP
EGFR: 101 ML/MIN/1.73M2 — SIGNIFICANT CHANGE UP
EGFR: 101 ML/MIN/1.73M2 — SIGNIFICANT CHANGE UP
EGFR: 104 ML/MIN/1.73M2 — SIGNIFICANT CHANGE UP
EGFR: 105 ML/MIN/1.73M2 — SIGNIFICANT CHANGE UP
EGFR: 107 ML/MIN/1.73M2 — SIGNIFICANT CHANGE UP
EGFR: 109 ML/MIN/1.73M2 — SIGNIFICANT CHANGE UP
EGFR: 109 ML/MIN/1.73M2 — SIGNIFICANT CHANGE UP
EGFR: 112 ML/MIN/1.73M2 — SIGNIFICANT CHANGE UP
EGFR: 116 ML/MIN/1.73M2 — SIGNIFICANT CHANGE UP
EGFR: 97 ML/MIN/1.73M2 — SIGNIFICANT CHANGE UP
EOSINOPHIL # BLD AUTO: 0 K/UL — SIGNIFICANT CHANGE UP (ref 0–0.5)
EOSINOPHIL # BLD AUTO: 0.01 K/UL — SIGNIFICANT CHANGE UP (ref 0–0.5)
EOSINOPHIL NFR BLD AUTO: 0 % — SIGNIFICANT CHANGE UP (ref 0–6)
EOSINOPHIL NFR BLD AUTO: 0.1 % — SIGNIFICANT CHANGE UP (ref 0–6)
ESTIMATED AVERAGE GLUCOSE: 174 — SIGNIFICANT CHANGE UP
FLUAV AG NPH QL: SIGNIFICANT CHANGE UP
FLUAV AG NPH QL: SIGNIFICANT CHANGE UP
FLUBV AG NPH QL: SIGNIFICANT CHANGE UP
FLUBV AG NPH QL: SIGNIFICANT CHANGE UP
GLUCOSE BLDC GLUCOMTR-MCNC: 146 MG/DL — HIGH (ref 70–99)
GLUCOSE BLDC GLUCOMTR-MCNC: 208 MG/DL — HIGH (ref 70–99)
GLUCOSE BLDC GLUCOMTR-MCNC: 263 MG/DL — HIGH (ref 70–99)
GLUCOSE BLDC GLUCOMTR-MCNC: 312 MG/DL — HIGH (ref 70–99)
GLUCOSE BLDC GLUCOMTR-MCNC: 369 MG/DL — HIGH (ref 70–99)
GLUCOSE SERPL-MCNC: 135 MG/DL — HIGH (ref 70–99)
GLUCOSE SERPL-MCNC: 147 MG/DL — HIGH (ref 70–99)
GLUCOSE SERPL-MCNC: 156 MG/DL — HIGH (ref 70–99)
GLUCOSE SERPL-MCNC: 176 MG/DL — HIGH (ref 70–99)
GLUCOSE SERPL-MCNC: 187 MG/DL — HIGH (ref 70–99)
GLUCOSE SERPL-MCNC: 231 MG/DL — HIGH (ref 70–99)
GLUCOSE SERPL-MCNC: 272 MG/DL — HIGH (ref 70–99)
GLUCOSE SERPL-MCNC: 285 MG/DL — HIGH (ref 70–99)
GLUCOSE SERPL-MCNC: 300 MG/DL — HIGH (ref 70–99)
GLUCOSE SERPL-MCNC: 72 MG/DL — SIGNIFICANT CHANGE UP (ref 70–99)
HCT VFR BLD CALC: 31.9 % — LOW (ref 34.5–45)
HCT VFR BLD CALC: 31.9 % — LOW (ref 34.5–45)
HCT VFR BLD CALC: 32.1 % — LOW (ref 34.5–45)
HCT VFR BLD CALC: 32.9 % — LOW (ref 34.5–45)
HCT VFR BLD CALC: 34.3 % — LOW (ref 34.5–45)
HCT VFR BLD CALC: 34.8 % — SIGNIFICANT CHANGE UP (ref 34.5–45)
HCT VFR BLD CALC: 35 % — SIGNIFICANT CHANGE UP (ref 34.5–45)
HCT VFR BLD CALC: 35.3 % — SIGNIFICANT CHANGE UP (ref 34.5–45)
HCT VFR BLD CALC: 35.4 % — SIGNIFICANT CHANGE UP (ref 34.5–45)
HCT VFR BLD CALC: 35.6 % — SIGNIFICANT CHANGE UP (ref 34.5–45)
HGB BLD-MCNC: 10.3 G/DL — LOW (ref 11.5–15.5)
HGB BLD-MCNC: 10.5 G/DL — LOW (ref 11.5–15.5)
HGB BLD-MCNC: 10.6 G/DL — LOW (ref 11.5–15.5)
HGB BLD-MCNC: 10.8 G/DL — LOW (ref 11.5–15.5)
HGB BLD-MCNC: 10.8 G/DL — LOW (ref 11.5–15.5)
HGB BLD-MCNC: 10.9 G/DL — LOW (ref 11.5–15.5)
HGB BLD-MCNC: 11 G/DL — LOW (ref 11.5–15.5)
HGB BLD-MCNC: 11.3 G/DL — LOW (ref 11.5–15.5)
HGB BLD-MCNC: 11.3 G/DL — LOW (ref 11.5–15.5)
HGB BLD-MCNC: 11.4 G/DL — LOW (ref 11.5–15.5)
IANC: 12.05 K/UL — HIGH (ref 1.8–7.4)
IANC: 13.69 K/UL — HIGH (ref 1.8–7.4)
IANC: 16.8 K/UL — HIGH (ref 1.8–7.4)
IANC: 17.26 K/UL — HIGH (ref 1.8–7.4)
IANC: 21.48 K/UL — HIGH (ref 1.8–7.4)
IANC: 7.45 K/UL — HIGH (ref 1.8–7.4)
IANC: 7.75 K/UL — HIGH (ref 1.8–7.4)
IANC: 9.82 K/UL — HIGH (ref 1.8–7.4)
IL6 FLD-MCNC: 1024 PG/ML — HIGH (ref 0–13)
IMM GRANULOCYTES NFR BLD AUTO: 0.5 % — SIGNIFICANT CHANGE UP (ref 0–0.9)
IMM GRANULOCYTES NFR BLD AUTO: 0.7 % — SIGNIFICANT CHANGE UP (ref 0–0.9)
IMM GRANULOCYTES NFR BLD AUTO: 1.4 % — HIGH (ref 0–0.9)
IMM GRANULOCYTES NFR BLD AUTO: 1.5 % — HIGH (ref 0–0.9)
IMM GRANULOCYTES NFR BLD AUTO: 2 % — HIGH (ref 0–0.9)
IMM GRANULOCYTES NFR BLD AUTO: 2.5 % — HIGH (ref 0–0.9)
INR BLD: 1 RATIO — SIGNIFICANT CHANGE UP (ref 0.85–1.16)
INR BLD: 1.26 RATIO — HIGH (ref 0.85–1.16)
LACTATE SERPL-SCNC: 1.2 MMOL/L — SIGNIFICANT CHANGE UP (ref 0.5–2)
LACTATE SERPL-SCNC: 5.6 MMOL/L — CRITICAL HIGH (ref 0.5–2)
LYMPHOCYTES # BLD AUTO: 0 % — LOW (ref 13–44)
LYMPHOCYTES # BLD AUTO: 0 % — LOW (ref 13–44)
LYMPHOCYTES # BLD AUTO: 0 K/UL — LOW (ref 1–3.3)
LYMPHOCYTES # BLD AUTO: 0 K/UL — LOW (ref 1–3.3)
LYMPHOCYTES # BLD AUTO: 0.36 K/UL — LOW (ref 1–3.3)
LYMPHOCYTES # BLD AUTO: 0.37 K/UL — LOW (ref 1–3.3)
LYMPHOCYTES # BLD AUTO: 0.46 K/UL — LOW (ref 1–3.3)
LYMPHOCYTES # BLD AUTO: 0.47 K/UL — LOW (ref 1–3.3)
LYMPHOCYTES # BLD AUTO: 0.68 K/UL — LOW (ref 1–3.3)
LYMPHOCYTES # BLD AUTO: 0.92 K/UL — LOW (ref 1–3.3)
LYMPHOCYTES # BLD AUTO: 1.6 % — LOW (ref 13–44)
LYMPHOCYTES # BLD AUTO: 2.8 % — LOW (ref 13–44)
LYMPHOCYTES # BLD AUTO: 3.1 % — LOW (ref 13–44)
LYMPHOCYTES # BLD AUTO: 4.1 % — LOW (ref 13–44)
LYMPHOCYTES # BLD AUTO: 7.3 % — LOW (ref 13–44)
LYMPHOCYTES # BLD AUTO: 9.8 % — LOW (ref 13–44)
MACROCYTES BLD QL: SLIGHT — SIGNIFICANT CHANGE UP
MACROCYTES BLD QL: SLIGHT — SIGNIFICANT CHANGE UP
MAGNESIUM SERPL-MCNC: 2.1 MG/DL — SIGNIFICANT CHANGE UP (ref 1.6–2.6)
MAGNESIUM SERPL-MCNC: 2.2 MG/DL — SIGNIFICANT CHANGE UP (ref 1.6–2.6)
MAGNESIUM SERPL-MCNC: 2.3 MG/DL — SIGNIFICANT CHANGE UP (ref 1.6–2.6)
MAGNESIUM SERPL-MCNC: 2.3 MG/DL — SIGNIFICANT CHANGE UP (ref 1.6–2.6)
MAGNESIUM SERPL-MCNC: 2.4 MG/DL — SIGNIFICANT CHANGE UP (ref 1.6–2.6)
MAGNESIUM SERPL-MCNC: 2.6 MG/DL — SIGNIFICANT CHANGE UP (ref 1.6–2.6)
MAGNESIUM SERPL-MCNC: 2.6 MG/DL — SIGNIFICANT CHANGE UP (ref 1.6–2.6)
MAGNESIUM SERPL-MCNC: 2.7 MG/DL — HIGH (ref 1.6–2.6)
MANUAL SMEAR VERIFICATION: SIGNIFICANT CHANGE UP
MCHC RBC-ENTMCNC: 30.4 PG — SIGNIFICANT CHANGE UP (ref 27–34)
MCHC RBC-ENTMCNC: 30.6 PG — SIGNIFICANT CHANGE UP (ref 27–34)
MCHC RBC-ENTMCNC: 30.8 G/DL — LOW (ref 32–36)
MCHC RBC-ENTMCNC: 30.9 G/DL — LOW (ref 32–36)
MCHC RBC-ENTMCNC: 31 PG — SIGNIFICANT CHANGE UP (ref 27–34)
MCHC RBC-ENTMCNC: 31.1 PG — SIGNIFICANT CHANGE UP (ref 27–34)
MCHC RBC-ENTMCNC: 31.1 PG — SIGNIFICANT CHANGE UP (ref 27–34)
MCHC RBC-ENTMCNC: 31.3 PG — SIGNIFICANT CHANGE UP (ref 27–34)
MCHC RBC-ENTMCNC: 31.7 PG — SIGNIFICANT CHANGE UP (ref 27–34)
MCHC RBC-ENTMCNC: 31.7 PG — SIGNIFICANT CHANGE UP (ref 27–34)
MCHC RBC-ENTMCNC: 32 G/DL — SIGNIFICANT CHANGE UP (ref 32–36)
MCHC RBC-ENTMCNC: 32 G/DL — SIGNIFICANT CHANGE UP (ref 32–36)
MCHC RBC-ENTMCNC: 32.1 G/DL — SIGNIFICANT CHANGE UP (ref 32–36)
MCHC RBC-ENTMCNC: 32.2 G/DL — SIGNIFICANT CHANGE UP (ref 32–36)
MCHC RBC-ENTMCNC: 32.3 G/DL — SIGNIFICANT CHANGE UP (ref 32–36)
MCHC RBC-ENTMCNC: 32.5 G/DL — SIGNIFICANT CHANGE UP (ref 32–36)
MCHC RBC-ENTMCNC: 32.7 G/DL — SIGNIFICANT CHANGE UP (ref 32–36)
MCHC RBC-ENTMCNC: 33.9 G/DL — SIGNIFICANT CHANGE UP (ref 32–36)
MCV RBC AUTO: 93.5 FL — SIGNIFICANT CHANGE UP (ref 80–100)
MCV RBC AUTO: 95.5 FL — SIGNIFICANT CHANGE UP (ref 80–100)
MCV RBC AUTO: 96.4 FL — SIGNIFICANT CHANGE UP (ref 80–100)
MCV RBC AUTO: 96.4 FL — SIGNIFICANT CHANGE UP (ref 80–100)
MCV RBC AUTO: 96.7 FL — SIGNIFICANT CHANGE UP (ref 80–100)
MCV RBC AUTO: 96.9 FL — SIGNIFICANT CHANGE UP (ref 80–100)
MCV RBC AUTO: 97.1 FL — SIGNIFICANT CHANGE UP (ref 80–100)
MCV RBC AUTO: 98.6 FL — SIGNIFICANT CHANGE UP (ref 80–100)
MCV RBC AUTO: 99.2 FL — SIGNIFICANT CHANGE UP (ref 80–100)
MCV RBC AUTO: 99.4 FL — SIGNIFICANT CHANGE UP (ref 80–100)
MONOCYTES # BLD AUTO: 0.31 K/UL — SIGNIFICANT CHANGE UP (ref 0–0.9)
MONOCYTES # BLD AUTO: 0.46 K/UL — SIGNIFICANT CHANGE UP (ref 0–0.9)
MONOCYTES # BLD AUTO: 0.52 K/UL — SIGNIFICANT CHANGE UP (ref 0–0.9)
MONOCYTES # BLD AUTO: 0.67 K/UL — SIGNIFICANT CHANGE UP (ref 0–0.9)
MONOCYTES # BLD AUTO: 0.72 K/UL — SIGNIFICANT CHANGE UP (ref 0–0.9)
MONOCYTES # BLD AUTO: 0.79 K/UL — SIGNIFICANT CHANGE UP (ref 0–0.9)
MONOCYTES # BLD AUTO: 0.8 K/UL — SIGNIFICANT CHANGE UP (ref 0–0.9)
MONOCYTES # BLD AUTO: 0.84 K/UL — SIGNIFICANT CHANGE UP (ref 0–0.9)
MONOCYTES NFR BLD AUTO: 2 % — SIGNIFICANT CHANGE UP (ref 2–14)
MONOCYTES NFR BLD AUTO: 2.4 % — SIGNIFICANT CHANGE UP (ref 2–14)
MONOCYTES NFR BLD AUTO: 3.5 % — SIGNIFICANT CHANGE UP (ref 2–14)
MONOCYTES NFR BLD AUTO: 3.6 % — SIGNIFICANT CHANGE UP (ref 2–14)
MONOCYTES NFR BLD AUTO: 4.4 % — SIGNIFICANT CHANGE UP (ref 2–14)
MONOCYTES NFR BLD AUTO: 7 % — SIGNIFICANT CHANGE UP (ref 2–14)
MONOCYTES NFR BLD AUTO: 7.7 % — SIGNIFICANT CHANGE UP (ref 2–14)
MONOCYTES NFR BLD AUTO: 9 % — SIGNIFICANT CHANGE UP (ref 2–14)
MRSA PCR RESULT.: SIGNIFICANT CHANGE UP
MYELOCYTES NFR BLD: 0.9 % — HIGH (ref 0–0)
NEUTROPHILS # BLD AUTO: 12.05 K/UL — HIGH (ref 1.8–7.4)
NEUTROPHILS # BLD AUTO: 13.69 K/UL — HIGH (ref 1.8–7.4)
NEUTROPHILS # BLD AUTO: 17.15 K/UL — HIGH (ref 1.8–7.4)
NEUTROPHILS # BLD AUTO: 17.88 K/UL — HIGH (ref 1.8–7.4)
NEUTROPHILS # BLD AUTO: 21.48 K/UL — HIGH (ref 1.8–7.4)
NEUTROPHILS # BLD AUTO: 7.45 K/UL — HIGH (ref 1.8–7.4)
NEUTROPHILS # BLD AUTO: 7.75 K/UL — HIGH (ref 1.8–7.4)
NEUTROPHILS # BLD AUTO: 9.82 K/UL — HIGH (ref 1.8–7.4)
NEUTROPHILS NFR BLD AUTO: 79.4 % — HIGH (ref 43–77)
NEUTROPHILS NFR BLD AUTO: 83.4 % — HIGH (ref 43–77)
NEUTROPHILS NFR BLD AUTO: 86.7 % — HIGH (ref 43–77)
NEUTROPHILS NFR BLD AUTO: 91.1 % — HIGH (ref 43–77)
NEUTROPHILS NFR BLD AUTO: 92.6 % — HIGH (ref 43–77)
NEUTROPHILS NFR BLD AUTO: 93 % — HIGH (ref 43–77)
NEUTROPHILS NFR BLD AUTO: 93.7 % — HIGH (ref 43–77)
NEUTROPHILS NFR BLD AUTO: 94.1 % — HIGH (ref 43–77)
NEUTS BAND # BLD: 1.7 % — SIGNIFICANT CHANGE UP (ref 0–6)
NEUTS BAND # BLD: 5.3 % — SIGNIFICANT CHANGE UP (ref 0–6)
NEUTS BAND NFR BLD: 1.7 % — SIGNIFICANT CHANGE UP (ref 0–6)
NEUTS BAND NFR BLD: 5.3 % — SIGNIFICANT CHANGE UP (ref 0–6)
NRBC # BLD AUTO: 0 K/UL — SIGNIFICANT CHANGE UP (ref 0–0)
NRBC # BLD AUTO: 0.02 K/UL — HIGH (ref 0–0)
NRBC # BLD AUTO: 0.03 K/UL — HIGH (ref 0–0)
NRBC # BLD AUTO: 0.04 K/UL — HIGH (ref 0–0)
NRBC # BLD: 0 /100 WBCS — SIGNIFICANT CHANGE UP (ref 0–0)
NRBC # FLD: 0 K/UL — SIGNIFICANT CHANGE UP (ref 0–0)
NRBC # FLD: 0.02 K/UL — HIGH (ref 0–0)
NRBC # FLD: 0.03 K/UL — HIGH (ref 0–0)
NRBC # FLD: 0.04 K/UL — HIGH (ref 0–0)
NRBC BLD-RTO: 0 /100 WBCS — SIGNIFICANT CHANGE UP (ref 0–0)
NT-PROBNP SERPL-SCNC: 1813 PG/ML — HIGH
NT-PROBNP SERPL-SCNC: 896 PG/ML — HIGH
OVALOCYTES BLD QL SMEAR: SLIGHT — SIGNIFICANT CHANGE UP
OVALOCYTES BLD QL SMEAR: SLIGHT — SIGNIFICANT CHANGE UP
PHOSPHATE SERPL-MCNC: 2.5 MG/DL — SIGNIFICANT CHANGE UP (ref 2.5–4.5)
PHOSPHATE SERPL-MCNC: 2.7 MG/DL — SIGNIFICANT CHANGE UP (ref 2.5–4.5)
PHOSPHATE SERPL-MCNC: 2.9 MG/DL — SIGNIFICANT CHANGE UP (ref 2.5–4.5)
PHOSPHATE SERPL-MCNC: 2.9 MG/DL — SIGNIFICANT CHANGE UP (ref 2.5–4.5)
PHOSPHATE SERPL-MCNC: 3.1 MG/DL — SIGNIFICANT CHANGE UP (ref 2.5–4.5)
PHOSPHATE SERPL-MCNC: 3.6 MG/DL — SIGNIFICANT CHANGE UP (ref 2.5–4.5)
PHOSPHATE SERPL-MCNC: 3.8 MG/DL — SIGNIFICANT CHANGE UP (ref 2.5–4.5)
PHOSPHATE SERPL-MCNC: 3.9 MG/DL — SIGNIFICANT CHANGE UP (ref 2.5–4.5)
PHOSPHATE SERPL-MCNC: 4 MG/DL — SIGNIFICANT CHANGE UP (ref 2.5–4.5)
PLAT MORPH BLD: NORMAL — SIGNIFICANT CHANGE UP
PLAT MORPH BLD: NORMAL — SIGNIFICANT CHANGE UP
PLATELET # BLD AUTO: 128 K/UL — LOW (ref 150–400)
PLATELET # BLD AUTO: 129 K/UL — LOW (ref 150–400)
PLATELET # BLD AUTO: 130 K/UL — LOW (ref 150–400)
PLATELET # BLD AUTO: 155 K/UL — SIGNIFICANT CHANGE UP (ref 150–400)
PLATELET # BLD AUTO: 162 K/UL — SIGNIFICANT CHANGE UP (ref 150–400)
PLATELET # BLD AUTO: 163 K/UL — SIGNIFICANT CHANGE UP (ref 150–400)
PLATELET # BLD AUTO: 176 K/UL — SIGNIFICANT CHANGE UP (ref 150–400)
PLATELET # BLD AUTO: 181 K/UL — SIGNIFICANT CHANGE UP (ref 150–400)
PLATELET # BLD AUTO: 181 K/UL — SIGNIFICANT CHANGE UP (ref 150–400)
PLATELET # BLD AUTO: 188 K/UL — SIGNIFICANT CHANGE UP (ref 150–400)
PLATELET COUNT - ESTIMATE: ABNORMAL
PLATELET COUNT - ESTIMATE: NORMAL — SIGNIFICANT CHANGE UP
POIKILOCYTOSIS BLD QL AUTO: SLIGHT — SIGNIFICANT CHANGE UP
POIKILOCYTOSIS BLD QL AUTO: SLIGHT — SIGNIFICANT CHANGE UP
POLYCHROMASIA BLD QL SMEAR: SLIGHT — SIGNIFICANT CHANGE UP
POTASSIUM SERPL-MCNC: 3.2 MMOL/L — LOW (ref 3.5–5.3)
POTASSIUM SERPL-MCNC: 3.3 MMOL/L — LOW (ref 3.5–5.3)
POTASSIUM SERPL-MCNC: 3.4 MMOL/L — LOW (ref 3.5–5.3)
POTASSIUM SERPL-MCNC: 3.4 MMOL/L — LOW (ref 3.5–5.3)
POTASSIUM SERPL-MCNC: 3.6 MMOL/L — SIGNIFICANT CHANGE UP (ref 3.5–5.3)
POTASSIUM SERPL-MCNC: 3.6 MMOL/L — SIGNIFICANT CHANGE UP (ref 3.5–5.3)
POTASSIUM SERPL-MCNC: 3.9 MMOL/L — SIGNIFICANT CHANGE UP (ref 3.5–5.3)
POTASSIUM SERPL-MCNC: 4.6 MMOL/L — SIGNIFICANT CHANGE UP (ref 3.5–5.3)
POTASSIUM SERPL-MCNC: 4.6 MMOL/L — SIGNIFICANT CHANGE UP (ref 3.5–5.3)
POTASSIUM SERPL-MCNC: 4.8 MMOL/L — SIGNIFICANT CHANGE UP (ref 3.5–5.3)
POTASSIUM SERPL-SCNC: 3.2 MMOL/L — LOW (ref 3.5–5.3)
POTASSIUM SERPL-SCNC: 3.3 MMOL/L — LOW (ref 3.5–5.3)
POTASSIUM SERPL-SCNC: 3.4 MMOL/L — LOW (ref 3.5–5.3)
POTASSIUM SERPL-SCNC: 3.4 MMOL/L — LOW (ref 3.5–5.3)
POTASSIUM SERPL-SCNC: 3.6 MMOL/L — SIGNIFICANT CHANGE UP (ref 3.5–5.3)
POTASSIUM SERPL-SCNC: 3.6 MMOL/L — SIGNIFICANT CHANGE UP (ref 3.5–5.3)
POTASSIUM SERPL-SCNC: 3.9 MMOL/L — SIGNIFICANT CHANGE UP (ref 3.5–5.3)
POTASSIUM SERPL-SCNC: 4.6 MMOL/L — SIGNIFICANT CHANGE UP (ref 3.5–5.3)
POTASSIUM SERPL-SCNC: 4.6 MMOL/L — SIGNIFICANT CHANGE UP (ref 3.5–5.3)
POTASSIUM SERPL-SCNC: 4.8 MMOL/L — SIGNIFICANT CHANGE UP (ref 3.5–5.3)
PROT SERPL-MCNC: 4.4 G/DL — LOW (ref 6–8.3)
PROT SERPL-MCNC: 4.6 G/DL — LOW (ref 6–8.3)
PROT SERPL-MCNC: 4.6 G/DL — LOW (ref 6–8.3)
PROT SERPL-MCNC: 5 G/DL — LOW (ref 6–8.3)
PROT SERPL-MCNC: 5 G/DL — LOW (ref 6–8.3)
PROT SERPL-MCNC: 5.1 G/DL — LOW (ref 6–8.3)
PROT SERPL-MCNC: 5.3 G/DL — LOW (ref 6–8.3)
PROTHROM AB SERPL-ACNC: 11.9 SEC — SIGNIFICANT CHANGE UP (ref 9.9–13.4)
PROTHROM AB SERPL-ACNC: 15 SEC — HIGH (ref 9.9–13.4)
RBC # BLD: 3.31 M/UL — LOW (ref 3.8–5.2)
RBC # BLD: 3.36 M/UL — LOW (ref 3.8–5.2)
RBC # BLD: 3.39 M/UL — LOW (ref 3.8–5.2)
RBC # BLD: 3.41 M/UL — LOW (ref 3.8–5.2)
RBC # BLD: 3.54 M/UL — LOW (ref 3.8–5.2)
RBC # BLD: 3.55 M/UL — LOW (ref 3.8–5.2)
RBC # BLD: 3.56 M/UL — LOW (ref 3.8–5.2)
RBC # BLD: 3.56 M/UL — LOW (ref 3.8–5.2)
RBC # BLD: 3.61 M/UL — LOW (ref 3.8–5.2)
RBC # BLD: 3.68 M/UL — LOW (ref 3.8–5.2)
RBC # FLD: 16.8 % — HIGH (ref 10.3–14.5)
RBC # FLD: 17.2 % — HIGH (ref 10.3–14.5)
RBC # FLD: 17.2 % — HIGH (ref 10.3–14.5)
RBC # FLD: 17.3 % — HIGH (ref 10.3–14.5)
RBC # FLD: 17.4 % — HIGH (ref 10.3–14.5)
RBC # FLD: 17.7 % — HIGH (ref 10.3–14.5)
RBC # FLD: 17.8 % — HIGH (ref 10.3–14.5)
RBC # FLD: 18.1 % — HIGH (ref 10.3–14.5)
RBC BLD AUTO: ABNORMAL
RBC BLD AUTO: ABNORMAL
RH IG SCN BLD-IMP: POSITIVE — SIGNIFICANT CHANGE UP
RH IG SCN BLD-IMP: POSITIVE — SIGNIFICANT CHANGE UP
RSV RNA NPH QL NAA+NON-PROBE: SIGNIFICANT CHANGE UP
RSV RNA NPH QL NAA+NON-PROBE: SIGNIFICANT CHANGE UP
S AUREUS DNA NOSE QL NAA+PROBE: SIGNIFICANT CHANGE UP
SARS-COV-2 RNA SPEC QL NAA+PROBE: SIGNIFICANT CHANGE UP
SARS-COV-2 RNA SPEC QL NAA+PROBE: SIGNIFICANT CHANGE UP
SCHISTOCYTES BLD QL AUTO: SLIGHT — SIGNIFICANT CHANGE UP
SODIUM SERPL-SCNC: 140 MMOL/L — SIGNIFICANT CHANGE UP (ref 135–145)
SODIUM SERPL-SCNC: 141 MMOL/L — SIGNIFICANT CHANGE UP (ref 135–145)
SODIUM SERPL-SCNC: 141 MMOL/L — SIGNIFICANT CHANGE UP (ref 135–145)
SODIUM SERPL-SCNC: 142 MMOL/L — SIGNIFICANT CHANGE UP (ref 135–145)
SODIUM SERPL-SCNC: 142 MMOL/L — SIGNIFICANT CHANGE UP (ref 135–145)
SODIUM SERPL-SCNC: 144 MMOL/L — SIGNIFICANT CHANGE UP (ref 135–145)
SODIUM SERPL-SCNC: 144 MMOL/L — SIGNIFICANT CHANGE UP (ref 135–145)
SODIUM SERPL-SCNC: 145 MMOL/L — SIGNIFICANT CHANGE UP (ref 135–145)
SODIUM SERPL-SCNC: 145 MMOL/L — SIGNIFICANT CHANGE UP (ref 135–145)
SODIUM SERPL-SCNC: 146 MMOL/L — HIGH (ref 135–145)
SPECIMEN SOURCE: SIGNIFICANT CHANGE UP
TROPONIN T, HIGH SENSITIVITY RESULT: 22 NG/L — SIGNIFICANT CHANGE UP
TROPONIN T, HIGH SENSITIVITY RESULT: 25 NG/L — SIGNIFICANT CHANGE UP
TROPONIN T, HIGH SENSITIVITY RESULT: 25 NG/L — SIGNIFICANT CHANGE UP
VIT D25+D1,25 OH+D1,25 PNL SERPL-MCNC: 64.9 PG/ML — SIGNIFICANT CHANGE UP (ref 19.9–79.3)
WBC # BLD: 11.33 K/UL — HIGH (ref 3.8–10.5)
WBC # BLD: 12.8 K/UL — HIGH (ref 3.8–10.5)
WBC # BLD: 14.79 K/UL — HIGH (ref 3.8–10.5)
WBC # BLD: 17.99 K/UL — HIGH (ref 3.8–10.5)
WBC # BLD: 18.11 K/UL — HIGH (ref 3.8–10.5)
WBC # BLD: 18.55 K/UL — HIGH (ref 3.8–10.5)
WBC # BLD: 22.94 K/UL — HIGH (ref 3.8–10.5)
WBC # BLD: 9.3 K/UL — SIGNIFICANT CHANGE UP (ref 3.8–10.5)
WBC # BLD: 9.38 K/UL — SIGNIFICANT CHANGE UP (ref 3.8–10.5)
WBC # BLD: 9.73 K/UL — SIGNIFICANT CHANGE UP (ref 3.8–10.5)
WBC # FLD AUTO: 11.33 K/UL — HIGH (ref 3.8–10.5)
WBC # FLD AUTO: 12.8 K/UL — HIGH (ref 3.8–10.5)
WBC # FLD AUTO: 14.79 K/UL — HIGH (ref 3.8–10.5)
WBC # FLD AUTO: 17.99 K/UL — HIGH (ref 3.8–10.5)
WBC # FLD AUTO: 18.11 K/UL — HIGH (ref 3.8–10.5)
WBC # FLD AUTO: 18.55 K/UL — HIGH (ref 3.8–10.5)
WBC # FLD AUTO: 22.94 K/UL — HIGH (ref 3.8–10.5)
WBC # FLD AUTO: 9.3 K/UL — SIGNIFICANT CHANGE UP (ref 3.8–10.5)
WBC # FLD AUTO: 9.38 K/UL — SIGNIFICANT CHANGE UP (ref 3.8–10.5)
WBC # FLD AUTO: 9.73 K/UL — SIGNIFICANT CHANGE UP (ref 3.8–10.5)

## 2025-01-01 PROCEDURE — 99223 1ST HOSP IP/OBS HIGH 75: CPT

## 2025-01-01 PROCEDURE — 71045 X-RAY EXAM CHEST 1 VIEW: CPT | Mod: 26

## 2025-01-01 PROCEDURE — 71275 CT ANGIOGRAPHY CHEST: CPT | Mod: 26

## 2025-01-01 PROCEDURE — 99232 SBSQ HOSP IP/OBS MODERATE 35: CPT

## 2025-01-01 PROCEDURE — 99497 ADVNCD CARE PLAN 30 MIN: CPT | Mod: 25

## 2025-01-01 PROCEDURE — 99291 CRITICAL CARE FIRST HOUR: CPT | Mod: GC

## 2025-01-01 PROCEDURE — 99233 SBSQ HOSP IP/OBS HIGH 50: CPT

## 2025-01-01 PROCEDURE — 73630 X-RAY EXAM OF FOOT: CPT | Mod: 26,RT

## 2025-01-01 PROCEDURE — 99291 CRITICAL CARE FIRST HOUR: CPT

## 2025-01-01 PROCEDURE — 74177 CT ABD & PELVIS W/CONTRAST: CPT | Mod: 26

## 2025-01-01 PROCEDURE — 99223 1ST HOSP IP/OBS HIGH 75: CPT | Mod: GC

## 2025-01-01 PROCEDURE — 93970 EXTREMITY STUDY: CPT | Mod: 26

## 2025-01-01 PROCEDURE — 70450 CT HEAD/BRAIN W/O DYE: CPT | Mod: 26

## 2025-01-01 PROCEDURE — 93010 ELECTROCARDIOGRAM REPORT: CPT

## 2025-01-01 RX ORDER — IPRATROPIUM BROMIDE AND ALBUTEROL SULFATE .5; 2.5 MG/3ML; MG/3ML
3 SOLUTION RESPIRATORY (INHALATION) EVERY 6 HOURS
Refills: 0 | Status: DISCONTINUED | OUTPATIENT
Start: 2025-01-01 | End: 2025-01-01

## 2025-01-01 RX ORDER — ACETAMINOPHEN 160 MG/5ML
600 SUSPENSION ORAL ONCE
Refills: 0 | Status: COMPLETED | OUTPATIENT
Start: 2025-01-01 | End: 2025-01-01

## 2025-01-01 RX ORDER — SULFAMETHOXAZOLE AND TRIMETHOPRIM 400; 80 MG/1; MG/1
1 TABLET ORAL
Refills: 0 | Status: DISCONTINUED | OUTPATIENT
Start: 2025-01-01 | End: 2025-01-01

## 2025-01-01 RX ORDER — PIPERACILLIN SODIUM AND TAZOBACTAM SODIUM 2; 250 G/50ML; MG/50ML
3.38 INJECTION, POWDER, FOR SOLUTION INTRAVENOUS ONCE
Refills: 0 | Status: COMPLETED | OUTPATIENT
Start: 2025-01-01 | End: 2025-01-01

## 2025-01-01 RX ORDER — ACETAMINOPHEN, DIPHENHYDRAMINE HCL, PHENYLEPHRINE HCL 325; 25; 5 MG/1; MG/1; MG/1
3 TABLET ORAL ONCE
Refills: 0 | Status: COMPLETED | OUTPATIENT
Start: 2025-01-01 | End: 2025-01-01

## 2025-01-01 RX ORDER — POTASSIUM CHLORIDE 750 MG/1
10 TABLET, EXTENDED RELEASE ORAL
Refills: 0 | Status: COMPLETED | OUTPATIENT
Start: 2025-01-01 | End: 2025-01-01

## 2025-01-01 RX ORDER — METOPROLOL SUCCINATE 25 MG
50 TABLET, EXTENDED RELEASE 24 HR ORAL EVERY 8 HOURS
Refills: 0 | Status: DISCONTINUED | OUTPATIENT
Start: 2025-01-01 | End: 2025-01-01

## 2025-01-01 RX ORDER — METHYLPREDNISOLONE ACETATE 40 MG/ML
40 VIAL (ML) INJECTION EVERY 12 HOURS
Refills: 0 | Status: DISCONTINUED | OUTPATIENT
Start: 2025-01-01 | End: 2025-01-01

## 2025-01-01 RX ORDER — POTASSIUM CHLORIDE 750 MG/1
10 TABLET, EXTENDED RELEASE ORAL EVERY 4 HOURS
Refills: 0 | Status: DISCONTINUED | OUTPATIENT
Start: 2025-01-01 | End: 2025-01-01

## 2025-01-01 RX ORDER — METHYLPREDNISOLONE ACETATE 40 MG/ML
125 VIAL (ML) INJECTION ONCE
Refills: 0 | Status: COMPLETED | OUTPATIENT
Start: 2025-01-01 | End: 2025-01-01

## 2025-01-01 RX ORDER — INSULIN LISPRO 100/ML
VIAL (ML) SUBCUTANEOUS AT BEDTIME
Refills: 0 | Status: DISCONTINUED | OUTPATIENT
Start: 2025-01-01 | End: 2025-01-01

## 2025-01-01 RX ORDER — MONTELUKAST SODIUM 5 MG/1
10 TABLET, CHEWABLE ORAL DAILY
Refills: 0 | Status: DISCONTINUED | OUTPATIENT
Start: 2025-01-01 | End: 2025-01-01

## 2025-01-01 RX ORDER — TOCILIZUMAB 180 MG/ML
320 INJECTION, SOLUTION SUBCUTANEOUS ONCE
Refills: 0 | Status: COMPLETED | OUTPATIENT
Start: 2025-01-01 | End: 2025-01-01

## 2025-01-01 RX ORDER — POTASSIUM CHLORIDE 750 MG/1
40 TABLET, EXTENDED RELEASE ORAL ONCE
Refills: 0 | Status: COMPLETED | OUTPATIENT
Start: 2025-01-01 | End: 2025-01-01

## 2025-01-01 RX ORDER — SODIUM CHLORIDE 9 G/ML
1000 INJECTION, SOLUTION INTRAVENOUS
Refills: 0 | Status: DISCONTINUED | OUTPATIENT
Start: 2025-01-01 | End: 2025-01-01

## 2025-01-01 RX ORDER — DM/PSEUDOEPHED/ACETAMINOPHEN 10-30-250
12.5 CAPSULE ORAL ONCE
Refills: 0 | Status: DISCONTINUED | OUTPATIENT
Start: 2025-01-01 | End: 2025-01-01

## 2025-01-01 RX ORDER — CARVEDILOL 6.25 MG
25 TABLET ORAL EVERY 12 HOURS
Refills: 0 | Status: DISCONTINUED | OUTPATIENT
Start: 2025-01-01 | End: 2025-01-01

## 2025-01-01 RX ORDER — INSULIN LISPRO 100/ML
VIAL (ML) SUBCUTANEOUS
Refills: 0 | Status: DISCONTINUED | OUTPATIENT
Start: 2025-01-01 | End: 2025-01-01

## 2025-01-01 RX ORDER — SENNOSIDES 8.6 MG
2 TABLET ORAL AT BEDTIME
Refills: 0 | Status: DISCONTINUED | OUTPATIENT
Start: 2025-01-01 | End: 2025-01-01

## 2025-01-01 RX ORDER — BISACODYL 5 MG
10 TABLET, DELAYED RELEASE (ENTERIC COATED) ORAL DAILY
Refills: 0 | Status: DISCONTINUED | OUTPATIENT
Start: 2025-01-01 | End: 2025-01-01

## 2025-01-01 RX ORDER — PANTOPRAZOLE 20 MG/1
40 TABLET, DELAYED RELEASE ORAL
Refills: 0 | Status: DISCONTINUED | OUTPATIENT
Start: 2025-01-01 | End: 2025-01-01

## 2025-01-01 RX ORDER — METOPROLOL SUCCINATE 25 MG
25 TABLET, EXTENDED RELEASE 24 HR ORAL EVERY 8 HOURS
Refills: 0 | Status: DISCONTINUED | OUTPATIENT
Start: 2025-01-01 | End: 2025-01-01

## 2025-01-01 RX ORDER — METOPROLOL SUCCINATE 25 MG
2.5 TABLET, EXTENDED RELEASE 24 HR ORAL ONCE
Refills: 0 | Status: COMPLETED | OUTPATIENT
Start: 2025-01-01 | End: 2025-01-01

## 2025-01-01 RX ORDER — BACTERIOSTATIC SODIUM CHLORIDE 0.9 %
250 VIAL (ML) INJECTION ONCE
Refills: 0 | Status: COMPLETED | OUTPATIENT
Start: 2025-01-01 | End: 2025-01-01

## 2025-01-01 RX ORDER — HYDROMORPHONE HYDROCHLORIDE 4 MG/ML
0.4 INJECTION, SOLUTION INTRAMUSCULAR; INTRAVENOUS; SUBCUTANEOUS ONCE
Refills: 0 | Status: DISCONTINUED | OUTPATIENT
Start: 2025-01-01 | End: 2025-01-01

## 2025-01-01 RX ORDER — HYDROMORPHONE HYDROCHLORIDE 4 MG/ML
0.5 INJECTION, SOLUTION INTRAMUSCULAR; INTRAVENOUS; SUBCUTANEOUS
Refills: 0 | Status: DISCONTINUED | OUTPATIENT
Start: 2025-01-01 | End: 2025-01-01

## 2025-01-01 RX ORDER — METHYLPREDNISOLONE ACETATE 40 MG/ML
30 VIAL (ML) INJECTION ONCE
Refills: 0 | Status: COMPLETED | OUTPATIENT
Start: 2025-01-01 | End: 2025-01-01

## 2025-01-01 RX ORDER — HYDROMORPHONE HYDROCHLORIDE 4 MG/ML
0.5 INJECTION, SOLUTION INTRAMUSCULAR; INTRAVENOUS; SUBCUTANEOUS EVERY 6 HOURS
Refills: 0 | Status: DISCONTINUED | OUTPATIENT
Start: 2025-01-01 | End: 2025-01-01

## 2025-01-01 RX ORDER — METOCLOPRAMIDE 10 MG/1
10 TABLET ORAL EVERY 8 HOURS
Refills: 0 | Status: DISCONTINUED | OUTPATIENT
Start: 2025-01-01 | End: 2025-01-01

## 2025-01-01 RX ORDER — HEPARIN SODIUM,PORCINE 10000/ML
800 VIAL (ML) INJECTION
Qty: 25000 | Refills: 0 | Status: DISCONTINUED | OUTPATIENT
Start: 2025-01-01 | End: 2025-01-01

## 2025-01-01 RX ORDER — NYSTATIN 500K UNIT
500000 TABLET ORAL EVERY 6 HOURS
Refills: 0 | Status: DISCONTINUED | OUTPATIENT
Start: 2025-01-01 | End: 2025-01-01

## 2025-01-01 RX ORDER — MIRTAZAPINE 30 MG/1
7.5 TABLET, FILM COATED ORAL AT BEDTIME
Refills: 0 | Status: DISCONTINUED | OUTPATIENT
Start: 2025-01-01 | End: 2025-01-01

## 2025-01-01 RX ORDER — POLYETHYLENE GLYCOL 3350 17 G/17G
17 POWDER, FOR SOLUTION ORAL
Refills: 0 | Status: DISCONTINUED | OUTPATIENT
Start: 2025-01-01 | End: 2025-01-01

## 2025-01-01 RX ORDER — TARLATAMAB-DLLE 10 MG
1 KIT INTRAVENOUS ONCE
Refills: 0 | Status: COMPLETED | OUTPATIENT
Start: 2025-01-01 | End: 2025-01-01

## 2025-01-01 RX ORDER — NALOXONE HYDROCHLORIDE 3 MG/.1ML
0.1 SPRAY NASAL
Refills: 0 | Status: DISCONTINUED | OUTPATIENT
Start: 2025-01-01 | End: 2025-01-01

## 2025-01-01 RX ORDER — ONDANSETRON 4 MG/1
4 TABLET, ORALLY DISINTEGRATING ORAL EVERY 8 HOURS
Refills: 0 | Status: DISCONTINUED | OUTPATIENT
Start: 2025-01-01 | End: 2025-01-01

## 2025-01-01 RX ORDER — ACETAMINOPHEN 160 MG/5ML
1000 SUSPENSION ORAL ONCE
Refills: 0 | Status: DISCONTINUED | OUTPATIENT
Start: 2025-01-01 | End: 2025-01-01

## 2025-01-01 RX ORDER — DM/PSEUDOEPHED/ACETAMINOPHEN 10-30-250
25 CAPSULE ORAL ONCE
Refills: 0 | Status: DISCONTINUED | OUTPATIENT
Start: 2025-01-01 | End: 2025-01-01

## 2025-01-01 RX ORDER — OXYCODONE HYDROCHLORIDE 30 MG/1
20 TABLET ORAL
Refills: 0 | Status: DISCONTINUED | OUTPATIENT
Start: 2025-01-01 | End: 2025-01-01

## 2025-01-01 RX ORDER — METHYLPREDNISOLONE ACETATE 40 MG/ML
60 VIAL (ML) INJECTION DAILY
Refills: 0 | Status: CANCELLED | OUTPATIENT
Start: 2025-02-07 | End: 2025-01-01

## 2025-01-01 RX ORDER — DM/PSEUDOEPHED/ACETAMINOPHEN 10-30-250
15 CAPSULE ORAL ONCE
Refills: 0 | Status: DISCONTINUED | OUTPATIENT
Start: 2025-01-01 | End: 2025-01-01

## 2025-01-01 RX ORDER — ENOXAPARIN SODIUM 100 MG/ML
30 INJECTION SUBCUTANEOUS EVERY 24 HOURS
Refills: 0 | Status: DISCONTINUED | OUTPATIENT
Start: 2025-01-01 | End: 2025-01-01

## 2025-01-01 RX ORDER — ACETAMINOPHEN 160 MG/5ML
750 SUSPENSION ORAL ONCE
Refills: 0 | Status: DISCONTINUED | OUTPATIENT
Start: 2025-01-01 | End: 2025-01-01

## 2025-01-01 RX ORDER — POLYETHYLENE GLYCOL 3350 17 G/17G
17 POWDER, FOR SOLUTION ORAL DAILY
Refills: 0 | Status: DISCONTINUED | OUTPATIENT
Start: 2025-01-01 | End: 2025-01-01

## 2025-01-01 RX ORDER — HYDROMORPHONE HYDROCHLORIDE 4 MG/ML
0.2 INJECTION, SOLUTION INTRAMUSCULAR; INTRAVENOUS; SUBCUTANEOUS EVERY 8 HOURS
Refills: 0 | Status: DISCONTINUED | OUTPATIENT
Start: 2025-01-01 | End: 2025-01-01

## 2025-01-01 RX ORDER — METHYLPREDNISOLONE ACETATE 40 MG/ML
70 VIAL (ML) INJECTION DAILY
Refills: 0 | Status: DISCONTINUED | OUTPATIENT
Start: 2025-02-05 | End: 2025-01-01

## 2025-01-01 RX ORDER — MECOBAL/LEVOMEFOLAT CA/B6 PHOS 2-3-35 MG
1 TABLET ORAL DAILY
Refills: 0 | Status: DISCONTINUED | OUTPATIENT
Start: 2025-01-01 | End: 2025-01-01

## 2025-01-01 RX ORDER — HEPARIN SODIUM,PORCINE 10000/ML
3500 VIAL (ML) INJECTION EVERY 6 HOURS
Refills: 0 | Status: DISCONTINUED | OUTPATIENT
Start: 2025-01-01 | End: 2025-01-01

## 2025-01-01 RX ORDER — HYDROMORPHONE HYDROCHLORIDE 4 MG/ML
0.5 INJECTION, SOLUTION INTRAMUSCULAR; INTRAVENOUS; SUBCUTANEOUS ONCE
Refills: 0 | Status: DISCONTINUED | OUTPATIENT
Start: 2025-01-01 | End: 2025-01-01

## 2025-01-01 RX ORDER — SODIUM CHLORIDE 9 G/ML
1000 INJECTION, SOLUTION INTRAVENOUS ONCE
Refills: 0 | Status: COMPLETED | OUTPATIENT
Start: 2025-01-01 | End: 2025-01-01

## 2025-01-01 RX ORDER — OXYCODONE HYDROCHLORIDE 30 MG/1
10 TABLET ORAL
Refills: 0 | Status: DISCONTINUED | OUTPATIENT
Start: 2025-01-01 | End: 2025-01-01

## 2025-01-01 RX ORDER — ANTISEPTIC SURGICAL SCRUB 0.04 MG/ML
1 SOLUTION TOPICAL DAILY
Refills: 0 | Status: DISCONTINUED | OUTPATIENT
Start: 2025-01-01 | End: 2025-01-01

## 2025-01-01 RX ORDER — DEXAMETHASONE SODIUM PHOSPHATE 4 MG/ML
4 INJECTION, SOLUTION INTRA-ARTICULAR; INTRALESIONAL; INTRAMUSCULAR; INTRAVENOUS; SOFT TISSUE DAILY
Refills: 0 | Status: DISCONTINUED | OUTPATIENT
Start: 2025-01-01 | End: 2025-01-01

## 2025-01-01 RX ORDER — ATORVASTATIN CALCIUM 80 MG/1
10 TABLET, FILM COATED ORAL AT BEDTIME
Refills: 0 | Status: DISCONTINUED | OUTPATIENT
Start: 2025-01-01 | End: 2025-01-01

## 2025-01-01 RX ORDER — MONTELUKAST SODIUM 5 MG/1
1 TABLET, CHEWABLE ORAL
Refills: 0 | DISCHARGE

## 2025-01-01 RX ORDER — DEXAMETHASONE SODIUM PHOSPHATE 4 MG/ML
10 INJECTION, SOLUTION INTRA-ARTICULAR; INTRALESIONAL; INTRAMUSCULAR; INTRAVENOUS; SOFT TISSUE EVERY 6 HOURS
Refills: 0 | Status: DISCONTINUED | OUTPATIENT
Start: 2025-01-01 | End: 2025-01-01

## 2025-01-01 RX ORDER — SIMVASTATIN 20 MG
1 TABLET ORAL
Refills: 0 | DISCHARGE

## 2025-01-01 RX ORDER — HEPARIN SODIUM,PORCINE 10000/ML
1500 VIAL (ML) INJECTION EVERY 6 HOURS
Refills: 0 | Status: DISCONTINUED | OUTPATIENT
Start: 2025-01-01 | End: 2025-01-01

## 2025-01-01 RX ORDER — PIPERACILLIN SODIUM AND TAZOBACTAM SODIUM 2; 250 G/50ML; MG/50ML
3.38 INJECTION, POWDER, FOR SOLUTION INTRAVENOUS EVERY 8 HOURS
Refills: 0 | Status: DISCONTINUED | OUTPATIENT
Start: 2025-01-01 | End: 2025-01-01

## 2025-01-01 RX ORDER — ACETAMINOPHEN 160 MG/5ML
650 SUSPENSION ORAL EVERY 6 HOURS
Refills: 0 | Status: DISCONTINUED | OUTPATIENT
Start: 2025-01-01 | End: 2025-01-01

## 2025-01-01 RX ORDER — ENOXAPARIN SODIUM 100 MG/ML
40 INJECTION SUBCUTANEOUS EVERY 12 HOURS
Refills: 0 | Status: DISCONTINUED | OUTPATIENT
Start: 2025-01-01 | End: 2025-01-01

## 2025-01-01 RX ORDER — LOSARTAN POTASSIUM 100 MG
50 TABLET ORAL DAILY
Refills: 0 | Status: DISCONTINUED | OUTPATIENT
Start: 2025-01-01 | End: 2025-01-01

## 2025-01-01 RX ORDER — HEPARIN SODIUM,PORCINE 10000/ML
VIAL (ML) INJECTION
Qty: 25000 | Refills: 0 | Status: DISCONTINUED | OUTPATIENT
Start: 2025-01-01 | End: 2025-01-01

## 2025-01-01 RX ORDER — ACETAMINOPHEN, DIPHENHYDRAMINE HCL, PHENYLEPHRINE HCL 325; 25; 5 MG/1; MG/1; MG/1
3 TABLET ORAL AT BEDTIME
Refills: 0 | Status: DISCONTINUED | OUTPATIENT
Start: 2025-01-01 | End: 2025-01-01

## 2025-01-01 RX ORDER — BACTERIOSTATIC SODIUM CHLORIDE 0.9 %
1000 VIAL (ML) INJECTION ONCE
Refills: 0 | Status: COMPLETED | OUTPATIENT
Start: 2025-01-01 | End: 2025-01-01

## 2025-01-01 RX ORDER — POTASSIUM CHLORIDE 750 MG/1
10 TABLET, EXTENDED RELEASE ORAL EVERY 4 HOURS
Refills: 0 | Status: COMPLETED | OUTPATIENT
Start: 2025-01-01 | End: 2025-01-01

## 2025-01-01 RX ORDER — GLUCAGON 3 MG/1
1 POWDER NASAL ONCE
Refills: 0 | Status: DISCONTINUED | OUTPATIENT
Start: 2025-01-01 | End: 2025-01-01

## 2025-01-01 RX ORDER — HYDROMORPHONE HYDROCHLORIDE 4 MG/ML
0.5 INJECTION, SOLUTION INTRAMUSCULAR; INTRAVENOUS; SUBCUTANEOUS EVERY 4 HOURS
Refills: 0 | Status: DISCONTINUED | OUTPATIENT
Start: 2025-01-01 | End: 2025-01-01

## 2025-01-01 RX ORDER — BACTERIOSTATIC SODIUM CHLORIDE 0.9 %
1000 VIAL (ML) INJECTION
Refills: 0 | Status: DISCONTINUED | OUTPATIENT
Start: 2025-01-01 | End: 2025-01-01

## 2025-01-01 RX ORDER — OXYCODONE HYDROCHLORIDE 30 MG/1
1 TABLET ORAL
Refills: 0 | DISCHARGE

## 2025-01-01 RX ORDER — DEXAMETHASONE SODIUM PHOSPHATE 4 MG/ML
8 INJECTION, SOLUTION INTRA-ARTICULAR; INTRALESIONAL; INTRAMUSCULAR; INTRAVENOUS; SOFT TISSUE ONCE
Refills: 0 | Status: COMPLETED | OUTPATIENT
Start: 2025-01-01 | End: 2025-01-01

## 2025-01-01 RX ORDER — BISACODYL 5 MG
5 TABLET, DELAYED RELEASE (ENTERIC COATED) ORAL EVERY 12 HOURS
Refills: 0 | Status: DISCONTINUED | OUTPATIENT
Start: 2025-01-01 | End: 2025-01-01

## 2025-01-01 RX ORDER — POTASSIUM CHLORIDE 750 MG/1
10 TABLET, EXTENDED RELEASE ORAL
Refills: 0 | Status: DISCONTINUED | OUTPATIENT
Start: 2025-01-01 | End: 2025-01-01

## 2025-01-01 RX ADMIN — OXYCODONE HYDROCHLORIDE 10 MILLIGRAM(S): 30 TABLET ORAL at 11:41

## 2025-01-01 RX ADMIN — TARLATAMAB-DLLE 1 MILLIGRAM(S): KIT at 14:16

## 2025-01-01 RX ADMIN — POLYETHYLENE GLYCOL 3350 17 GRAM(S): 17 POWDER, FOR SOLUTION ORAL at 07:16

## 2025-01-01 RX ADMIN — HYDROMORPHONE HYDROCHLORIDE 0.2 MILLIGRAM(S): 4 INJECTION, SOLUTION INTRAMUSCULAR; INTRAVENOUS; SUBCUTANEOUS at 00:01

## 2025-01-01 RX ADMIN — OXYCODONE HYDROCHLORIDE 10 MILLIGRAM(S): 30 TABLET ORAL at 22:29

## 2025-01-01 RX ADMIN — HYDROMORPHONE HYDROCHLORIDE 0.5 MILLIGRAM(S): 4 INJECTION, SOLUTION INTRAMUSCULAR; INTRAVENOUS; SUBCUTANEOUS at 08:55

## 2025-01-01 RX ADMIN — ANTISEPTIC SURGICAL SCRUB 1 APPLICATION(S): 0.04 SOLUTION TOPICAL at 12:35

## 2025-01-01 RX ADMIN — Medication 0.63 MILLIGRAM(S): at 09:52

## 2025-01-01 RX ADMIN — DEXAMETHASONE SODIUM PHOSPHATE 101.6 MILLIGRAM(S): 4 INJECTION, SOLUTION INTRA-ARTICULAR; INTRALESIONAL; INTRAMUSCULAR; INTRAVENOUS; SOFT TISSUE at 12:31

## 2025-01-01 RX ADMIN — Medication 2: at 08:38

## 2025-01-01 RX ADMIN — Medication 0.63 MILLIGRAM(S): at 21:16

## 2025-01-01 RX ADMIN — Medication 8 UNIT(S)/HR: at 08:50

## 2025-01-01 RX ADMIN — Medication 1 TABLET(S): at 11:42

## 2025-01-01 RX ADMIN — MONTELUKAST SODIUM 10 MILLIGRAM(S): 5 TABLET, CHEWABLE ORAL at 12:32

## 2025-01-01 RX ADMIN — HYDROMORPHONE HYDROCHLORIDE 0.2 MILLIGRAM(S): 4 INJECTION, SOLUTION INTRAMUSCULAR; INTRAVENOUS; SUBCUTANEOUS at 05:06

## 2025-01-01 RX ADMIN — Medication 4: at 09:20

## 2025-01-01 RX ADMIN — PIPERACILLIN SODIUM AND TAZOBACTAM SODIUM 200 GRAM(S): 2; 250 INJECTION, POWDER, FOR SOLUTION INTRAVENOUS at 09:26

## 2025-01-01 RX ADMIN — Medication 500000 UNIT(S): at 12:32

## 2025-01-01 RX ADMIN — Medication 6: at 12:18

## 2025-01-01 RX ADMIN — DEXAMETHASONE SODIUM PHOSPHATE 102 MILLIGRAM(S): 4 INJECTION, SOLUTION INTRA-ARTICULAR; INTRALESIONAL; INTRAMUSCULAR; INTRAVENOUS; SOFT TISSUE at 07:08

## 2025-01-01 RX ADMIN — Medication 40 MILLIGRAM(S): at 17:21

## 2025-01-01 RX ADMIN — Medication 25 MILLIGRAM(S): at 18:02

## 2025-01-01 RX ADMIN — Medication 250 MILLILITER(S): at 17:15

## 2025-01-01 RX ADMIN — IPRATROPIUM BROMIDE AND ALBUTEROL SULFATE 3 MILLILITER(S): .5; 2.5 SOLUTION RESPIRATORY (INHALATION) at 16:03

## 2025-01-01 RX ADMIN — Medication 0.63 MILLIGRAM(S): at 21:54

## 2025-01-01 RX ADMIN — ENOXAPARIN SODIUM 40 MILLIGRAM(S): 100 INJECTION SUBCUTANEOUS at 05:10

## 2025-01-01 RX ADMIN — ANTISEPTIC SURGICAL SCRUB 1 APPLICATION(S): 0.04 SOLUTION TOPICAL at 13:29

## 2025-01-01 RX ADMIN — Medication 0.2 MILLIGRAM(S): at 15:53

## 2025-01-01 RX ADMIN — Medication 40 MILLIGRAM(S): at 05:06

## 2025-01-01 RX ADMIN — Medication 0.63 MILLIGRAM(S): at 15:55

## 2025-01-01 RX ADMIN — OXYCODONE HYDROCHLORIDE 10 MILLIGRAM(S): 30 TABLET ORAL at 19:44

## 2025-01-01 RX ADMIN — ATORVASTATIN CALCIUM 10 MILLIGRAM(S): 80 TABLET, FILM COATED ORAL at 22:17

## 2025-01-01 RX ADMIN — MONTELUKAST SODIUM 10 MILLIGRAM(S): 5 TABLET, CHEWABLE ORAL at 11:41

## 2025-01-01 RX ADMIN — ENOXAPARIN SODIUM 30 MILLIGRAM(S): 100 INJECTION SUBCUTANEOUS at 19:21

## 2025-01-01 RX ADMIN — HYDROMORPHONE HYDROCHLORIDE 0.2 MILLIGRAM(S): 4 INJECTION, SOLUTION INTRAMUSCULAR; INTRAVENOUS; SUBCUTANEOUS at 14:35

## 2025-01-01 RX ADMIN — ANTISEPTIC SURGICAL SCRUB 1 APPLICATION(S): 0.04 SOLUTION TOPICAL at 16:04

## 2025-01-01 RX ADMIN — HYDROMORPHONE HYDROCHLORIDE 0.5 MILLIGRAM(S): 4 INJECTION, SOLUTION INTRAMUSCULAR; INTRAVENOUS; SUBCUTANEOUS at 17:30

## 2025-01-01 RX ADMIN — Medication 25 MILLIGRAM(S): at 14:35

## 2025-01-01 RX ADMIN — IPRATROPIUM BROMIDE AND ALBUTEROL SULFATE 3 MILLILITER(S): .5; 2.5 SOLUTION RESPIRATORY (INHALATION) at 04:09

## 2025-01-01 RX ADMIN — Medication 0.63 MILLIGRAM(S): at 15:12

## 2025-01-01 RX ADMIN — TARLATAMAB-DLLE 1 MILLIGRAM(S): KIT at 15:20

## 2025-01-01 RX ADMIN — Medication 4: at 08:49

## 2025-01-01 RX ADMIN — HYDROMORPHONE HYDROCHLORIDE 0.2 MILLIGRAM(S): 4 INJECTION, SOLUTION INTRAMUSCULAR; INTRAVENOUS; SUBCUTANEOUS at 00:47

## 2025-01-01 RX ADMIN — Medication 8: at 18:03

## 2025-01-01 RX ADMIN — HYDROMORPHONE HYDROCHLORIDE 0.5 MILLIGRAM(S): 4 INJECTION, SOLUTION INTRAMUSCULAR; INTRAVENOUS; SUBCUTANEOUS at 18:30

## 2025-01-01 RX ADMIN — MONTELUKAST SODIUM 10 MILLIGRAM(S): 5 TABLET, CHEWABLE ORAL at 11:15

## 2025-01-01 RX ADMIN — Medication 2 TABLET(S): at 22:15

## 2025-01-01 RX ADMIN — Medication 2.5 MILLIGRAM(S): at 19:39

## 2025-01-01 RX ADMIN — PIPERACILLIN SODIUM AND TAZOBACTAM SODIUM 25 GRAM(S): 2; 250 INJECTION, POWDER, FOR SOLUTION INTRAVENOUS at 16:00

## 2025-01-01 RX ADMIN — Medication 40 MILLIGRAM(S): at 07:24

## 2025-01-01 RX ADMIN — POTASSIUM CHLORIDE 100 MILLIEQUIVALENT(S): 750 TABLET, EXTENDED RELEASE ORAL at 09:59

## 2025-01-01 RX ADMIN — PANTOPRAZOLE 40 MILLIGRAM(S): 20 TABLET, DELAYED RELEASE ORAL at 18:17

## 2025-01-01 RX ADMIN — MIRTAZAPINE 7.5 MILLIGRAM(S): 30 TABLET, FILM COATED ORAL at 22:30

## 2025-01-01 RX ADMIN — ANTISEPTIC SURGICAL SCRUB 1 APPLICATION(S): 0.04 SOLUTION TOPICAL at 11:49

## 2025-01-01 RX ADMIN — PANTOPRAZOLE 40 MILLIGRAM(S): 20 TABLET, DELAYED RELEASE ORAL at 05:18

## 2025-01-01 RX ADMIN — HYDROMORPHONE HYDROCHLORIDE 0.5 MILLIGRAM(S): 4 INJECTION, SOLUTION INTRAMUSCULAR; INTRAVENOUS; SUBCUTANEOUS at 12:14

## 2025-01-01 RX ADMIN — MONTELUKAST SODIUM 10 MILLIGRAM(S): 5 TABLET, CHEWABLE ORAL at 12:23

## 2025-01-01 RX ADMIN — HYDROMORPHONE HYDROCHLORIDE 0.2 MILLIGRAM(S): 4 INJECTION, SOLUTION INTRAMUSCULAR; INTRAVENOUS; SUBCUTANEOUS at 01:01

## 2025-01-01 RX ADMIN — PIPERACILLIN SODIUM AND TAZOBACTAM SODIUM 25 GRAM(S): 2; 250 INJECTION, POWDER, FOR SOLUTION INTRAVENOUS at 14:35

## 2025-01-01 RX ADMIN — ACETAMINOPHEN 240 MILLIGRAM(S): 160 SUSPENSION ORAL at 20:10

## 2025-01-01 RX ADMIN — POLYETHYLENE GLYCOL 3350 17 GRAM(S): 17 POWDER, FOR SOLUTION ORAL at 18:17

## 2025-01-01 RX ADMIN — OXYCODONE HYDROCHLORIDE 10 MILLIGRAM(S): 30 TABLET ORAL at 20:57

## 2025-01-01 RX ADMIN — Medication 40 MILLIGRAM(S): at 05:11

## 2025-01-01 RX ADMIN — Medication 1000 UNIT(S): at 12:24

## 2025-01-01 RX ADMIN — Medication 0.63 MILLIGRAM(S): at 03:54

## 2025-01-01 RX ADMIN — Medication 1000 UNIT(S): at 11:18

## 2025-01-01 RX ADMIN — Medication 100 MILLILITER(S): at 19:20

## 2025-01-01 RX ADMIN — Medication 25 MILLIGRAM(S): at 07:16

## 2025-01-01 RX ADMIN — HYDROMORPHONE HYDROCHLORIDE 0.2 MILLIGRAM(S): 4 INJECTION, SOLUTION INTRAMUSCULAR; INTRAVENOUS; SUBCUTANEOUS at 16:00

## 2025-01-01 RX ADMIN — ANTISEPTIC SURGICAL SCRUB 1 APPLICATION(S): 0.04 SOLUTION TOPICAL at 11:15

## 2025-01-01 RX ADMIN — HYDROMORPHONE HYDROCHLORIDE 0.2 MILLIGRAM(S): 4 INJECTION, SOLUTION INTRAMUSCULAR; INTRAVENOUS; SUBCUTANEOUS at 14:40

## 2025-01-01 RX ADMIN — ACETAMINOPHEN 240 MILLIGRAM(S): 160 SUSPENSION ORAL at 09:46

## 2025-01-01 RX ADMIN — Medication 4: at 09:05

## 2025-01-01 RX ADMIN — Medication 0.63 MILLIGRAM(S): at 20:29

## 2025-01-01 RX ADMIN — OXYCODONE HYDROCHLORIDE 20 MILLIGRAM(S): 30 TABLET ORAL at 22:04

## 2025-01-01 RX ADMIN — PANTOPRAZOLE 40 MILLIGRAM(S): 20 TABLET, DELAYED RELEASE ORAL at 07:08

## 2025-01-01 RX ADMIN — POTASSIUM CHLORIDE 100 MILLIEQUIVALENT(S): 750 TABLET, EXTENDED RELEASE ORAL at 13:27

## 2025-01-01 RX ADMIN — PIPERACILLIN SODIUM AND TAZOBACTAM SODIUM 25 GRAM(S): 2; 250 INJECTION, POWDER, FOR SOLUTION INTRAVENOUS at 22:03

## 2025-01-01 RX ADMIN — ENOXAPARIN SODIUM 30 MILLIGRAM(S): 100 INJECTION SUBCUTANEOUS at 22:17

## 2025-01-01 RX ADMIN — Medication 0.25 MILLIGRAM(S): at 11:16

## 2025-01-01 RX ADMIN — MIRTAZAPINE 7.5 MILLIGRAM(S): 30 TABLET, FILM COATED ORAL at 22:05

## 2025-01-01 RX ADMIN — ANTISEPTIC SURGICAL SCRUB 1 APPLICATION(S): 0.04 SOLUTION TOPICAL at 12:26

## 2025-01-01 RX ADMIN — HYDROMORPHONE HYDROCHLORIDE 0.4 MILLIGRAM(S): 4 INJECTION, SOLUTION INTRAMUSCULAR; INTRAVENOUS; SUBCUTANEOUS at 09:19

## 2025-01-01 RX ADMIN — Medication 500000 UNIT(S): at 11:17

## 2025-01-01 RX ADMIN — DEXAMETHASONE SODIUM PHOSPHATE 4 MILLIGRAM(S): 4 INJECTION, SOLUTION INTRA-ARTICULAR; INTRALESIONAL; INTRAMUSCULAR; INTRAVENOUS; SOFT TISSUE at 06:19

## 2025-01-01 RX ADMIN — OXYCODONE HYDROCHLORIDE 20 MILLIGRAM(S): 30 TABLET ORAL at 19:03

## 2025-01-01 RX ADMIN — Medication 50 MILLIGRAM(S): at 07:02

## 2025-01-01 RX ADMIN — ENOXAPARIN SODIUM 40 MILLIGRAM(S): 100 INJECTION SUBCUTANEOUS at 17:44

## 2025-01-01 RX ADMIN — Medication 2.5 MILLIGRAM(S): at 19:08

## 2025-01-01 RX ADMIN — HYDROMORPHONE HYDROCHLORIDE 0.2 MILLIGRAM(S): 4 INJECTION, SOLUTION INTRAMUSCULAR; INTRAVENOUS; SUBCUTANEOUS at 15:53

## 2025-01-01 RX ADMIN — ATORVASTATIN CALCIUM 10 MILLIGRAM(S): 80 TABLET, FILM COATED ORAL at 22:04

## 2025-01-01 RX ADMIN — HYDROMORPHONE HYDROCHLORIDE 0.2 MILLIGRAM(S): 4 INJECTION, SOLUTION INTRAMUSCULAR; INTRAVENOUS; SUBCUTANEOUS at 05:11

## 2025-01-01 RX ADMIN — HYDROMORPHONE HYDROCHLORIDE 0.5 MILLIGRAM(S): 4 INJECTION, SOLUTION INTRAMUSCULAR; INTRAVENOUS; SUBCUTANEOUS at 19:56

## 2025-01-01 RX ADMIN — ENOXAPARIN SODIUM 40 MILLIGRAM(S): 100 INJECTION SUBCUTANEOUS at 07:26

## 2025-01-01 RX ADMIN — Medication 25 MILLIGRAM(S): at 19:20

## 2025-01-01 RX ADMIN — Medication 50 MILLIGRAM(S): at 05:17

## 2025-01-01 RX ADMIN — OXYCODONE HYDROCHLORIDE 10 MILLIGRAM(S): 30 TABLET ORAL at 20:44

## 2025-01-01 RX ADMIN — Medication 500000 UNIT(S): at 19:21

## 2025-01-01 RX ADMIN — Medication 6: at 13:10

## 2025-01-01 RX ADMIN — Medication 0.25 MILLIGRAM(S): at 23:07

## 2025-01-01 RX ADMIN — Medication 40 MILLIGRAM(S): at 17:44

## 2025-01-01 RX ADMIN — MIRTAZAPINE 7.5 MILLIGRAM(S): 30 TABLET, FILM COATED ORAL at 21:46

## 2025-01-01 RX ADMIN — Medication 25 MILLIGRAM(S): at 06:20

## 2025-01-01 RX ADMIN — Medication 50 MILLIGRAM(S): at 07:38

## 2025-01-01 RX ADMIN — Medication 50 MILLILITER(S): at 03:51

## 2025-01-01 RX ADMIN — TOCILIZUMAB 116 MILLIGRAM(S): 180 INJECTION, SOLUTION SUBCUTANEOUS at 18:52

## 2025-01-01 RX ADMIN — Medication 25 MILLIGRAM(S): at 13:23

## 2025-01-01 RX ADMIN — Medication 8: at 13:27

## 2025-01-01 RX ADMIN — Medication 25 MILLIGRAM(S): at 17:31

## 2025-01-01 RX ADMIN — ACETAMINOPHEN, DIPHENHYDRAMINE HCL, PHENYLEPHRINE HCL 3 MILLIGRAM(S): 325; 25; 5 TABLET ORAL at 01:25

## 2025-01-01 RX ADMIN — Medication 4: at 09:07

## 2025-01-01 RX ADMIN — OXYCODONE HYDROCHLORIDE 20 MILLIGRAM(S): 30 TABLET ORAL at 06:20

## 2025-01-01 RX ADMIN — HYDROMORPHONE HYDROCHLORIDE 0.2 MILLIGRAM(S): 4 INJECTION, SOLUTION INTRAMUSCULAR; INTRAVENOUS; SUBCUTANEOUS at 21:41

## 2025-01-01 RX ADMIN — SODIUM CHLORIDE 75 MILLILITER(S): 9 INJECTION, SOLUTION INTRAVENOUS at 19:35

## 2025-01-01 RX ADMIN — PIPERACILLIN SODIUM AND TAZOBACTAM SODIUM 25 GRAM(S): 2; 250 INJECTION, POWDER, FOR SOLUTION INTRAVENOUS at 16:04

## 2025-01-01 RX ADMIN — ACETAMINOPHEN 240 MILLIGRAM(S): 160 SUSPENSION ORAL at 10:05

## 2025-01-01 RX ADMIN — OXYCODONE HYDROCHLORIDE 20 MILLIGRAM(S): 30 TABLET ORAL at 07:39

## 2025-01-01 RX ADMIN — Medication 200 MILLILITER(S): at 15:25

## 2025-01-01 RX ADMIN — POLYETHYLENE GLYCOL 3350 17 GRAM(S): 17 POWDER, FOR SOLUTION ORAL at 17:31

## 2025-01-01 RX ADMIN — POTASSIUM CHLORIDE 40 MILLIEQUIVALENT(S): 750 TABLET, EXTENDED RELEASE ORAL at 09:20

## 2025-01-01 RX ADMIN — OXYCODONE HYDROCHLORIDE 20 MILLIGRAM(S): 30 TABLET ORAL at 06:44

## 2025-01-01 RX ADMIN — OXYCODONE HYDROCHLORIDE 20 MILLIGRAM(S): 30 TABLET ORAL at 19:14

## 2025-01-01 RX ADMIN — Medication 1: at 22:42

## 2025-01-01 RX ADMIN — Medication 1 TABLET(S): at 12:32

## 2025-01-01 RX ADMIN — Medication 2: at 13:13

## 2025-01-01 RX ADMIN — Medication 6: at 08:42

## 2025-01-01 RX ADMIN — ENOXAPARIN SODIUM 30 MILLIGRAM(S): 100 INJECTION SUBCUTANEOUS at 22:04

## 2025-01-01 RX ADMIN — SULFAMETHOXAZOLE AND TRIMETHOPRIM 1 TABLET(S): 400; 80 TABLET ORAL at 14:54

## 2025-01-01 RX ADMIN — POLYETHYLENE GLYCOL 3350 17 GRAM(S): 17 POWDER, FOR SOLUTION ORAL at 07:38

## 2025-01-01 RX ADMIN — Medication 1000 UNIT(S): at 17:31

## 2025-01-01 RX ADMIN — Medication 2 TABLET(S): at 22:04

## 2025-01-01 RX ADMIN — Medication 25 MILLIGRAM(S): at 21:28

## 2025-01-01 RX ADMIN — HYDROMORPHONE HYDROCHLORIDE 0.2 MILLIGRAM(S): 4 INJECTION, SOLUTION INTRAMUSCULAR; INTRAVENOUS; SUBCUTANEOUS at 01:47

## 2025-01-01 RX ADMIN — HYDROMORPHONE HYDROCHLORIDE 0.2 MILLIGRAM(S): 4 INJECTION, SOLUTION INTRAMUSCULAR; INTRAVENOUS; SUBCUTANEOUS at 08:30

## 2025-01-01 RX ADMIN — Medication 0.5 MILLIGRAM(S): at 01:22

## 2025-01-01 RX ADMIN — PIPERACILLIN SODIUM AND TAZOBACTAM SODIUM 25 GRAM(S): 2; 250 INJECTION, POWDER, FOR SOLUTION INTRAVENOUS at 23:13

## 2025-01-01 RX ADMIN — Medication 0.12 MILLIGRAM(S): at 21:33

## 2025-01-01 RX ADMIN — HYDROMORPHONE HYDROCHLORIDE 0.2 MILLIGRAM(S): 4 INJECTION, SOLUTION INTRAMUSCULAR; INTRAVENOUS; SUBCUTANEOUS at 05:26

## 2025-01-01 RX ADMIN — Medication 8: at 12:33

## 2025-01-01 RX ADMIN — HYDROMORPHONE HYDROCHLORIDE 0.5 MILLIGRAM(S): 4 INJECTION, SOLUTION INTRAMUSCULAR; INTRAVENOUS; SUBCUTANEOUS at 21:06

## 2025-01-01 RX ADMIN — PANTOPRAZOLE 40 MILLIGRAM(S): 20 TABLET, DELAYED RELEASE ORAL at 07:39

## 2025-01-01 RX ADMIN — Medication 125 MILLIGRAM(S): at 09:22

## 2025-01-01 RX ADMIN — Medication 0.63 MILLIGRAM(S): at 04:00

## 2025-01-01 RX ADMIN — Medication 25 MILLIGRAM(S): at 05:35

## 2025-01-01 RX ADMIN — HYDROMORPHONE HYDROCHLORIDE 0.2 MILLIGRAM(S): 4 INJECTION, SOLUTION INTRAMUSCULAR; INTRAVENOUS; SUBCUTANEOUS at 05:21

## 2025-01-01 RX ADMIN — OXYCODONE HYDROCHLORIDE 10 MILLIGRAM(S): 30 TABLET ORAL at 21:50

## 2025-01-01 RX ADMIN — Medication 0.25 MILLIGRAM(S): at 17:21

## 2025-01-01 RX ADMIN — TOCILIZUMAB 116 MILLIGRAM(S): 180 INJECTION, SOLUTION SUBCUTANEOUS at 11:05

## 2025-01-01 RX ADMIN — HYDROMORPHONE HYDROCHLORIDE 0.5 MILLIGRAM(S): 4 INJECTION, SOLUTION INTRAMUSCULAR; INTRAVENOUS; SUBCUTANEOUS at 07:55

## 2025-01-01 RX ADMIN — ACETAMINOPHEN 600 MILLIGRAM(S): 160 SUSPENSION ORAL at 01:30

## 2025-01-01 RX ADMIN — ATORVASTATIN CALCIUM 10 MILLIGRAM(S): 80 TABLET, FILM COATED ORAL at 21:46

## 2025-01-01 RX ADMIN — MIRTAZAPINE 7.5 MILLIGRAM(S): 30 TABLET, FILM COATED ORAL at 21:41

## 2025-01-01 RX ADMIN — IPRATROPIUM BROMIDE AND ALBUTEROL SULFATE 3 MILLILITER(S): .5; 2.5 SOLUTION RESPIRATORY (INHALATION) at 16:39

## 2025-01-01 RX ADMIN — ATORVASTATIN CALCIUM 10 MILLIGRAM(S): 80 TABLET, FILM COATED ORAL at 21:29

## 2025-01-01 RX ADMIN — OXYCODONE HYDROCHLORIDE 10 MILLIGRAM(S): 30 TABLET ORAL at 16:05

## 2025-01-01 RX ADMIN — Medication 6: at 08:54

## 2025-01-01 RX ADMIN — PIPERACILLIN SODIUM AND TAZOBACTAM SODIUM 25 GRAM(S): 2; 250 INJECTION, POWDER, FOR SOLUTION INTRAVENOUS at 05:11

## 2025-01-01 RX ADMIN — PANTOPRAZOLE 40 MILLIGRAM(S): 20 TABLET, DELAYED RELEASE ORAL at 06:20

## 2025-01-01 RX ADMIN — PIPERACILLIN SODIUM AND TAZOBACTAM SODIUM 25 GRAM(S): 2; 250 INJECTION, POWDER, FOR SOLUTION INTRAVENOUS at 13:23

## 2025-01-01 RX ADMIN — POTASSIUM CHLORIDE 40 MILLIEQUIVALENT(S): 750 TABLET, EXTENDED RELEASE ORAL at 10:32

## 2025-01-01 RX ADMIN — SODIUM CHLORIDE 1000 MILLILITER(S): 9 INJECTION, SOLUTION INTRAVENOUS at 15:08

## 2025-01-01 RX ADMIN — Medication 6: at 08:45

## 2025-01-01 RX ADMIN — POTASSIUM CHLORIDE 100 MILLIEQUIVALENT(S): 750 TABLET, EXTENDED RELEASE ORAL at 08:55

## 2025-01-01 RX ADMIN — Medication 1 TABLET(S): at 12:23

## 2025-01-01 RX ADMIN — Medication 4: at 18:16

## 2025-01-01 RX ADMIN — Medication 50 MILLIGRAM(S): at 07:25

## 2025-01-01 RX ADMIN — Medication 0.25 MILLIGRAM(S): at 09:42

## 2025-01-01 RX ADMIN — OXYCODONE HYDROCHLORIDE 20 MILLIGRAM(S): 30 TABLET ORAL at 19:20

## 2025-01-01 RX ADMIN — Medication 40 MILLIGRAM(S): at 05:20

## 2025-01-01 RX ADMIN — HYDROMORPHONE HYDROCHLORIDE 0.5 MILLIGRAM(S): 4 INJECTION, SOLUTION INTRAMUSCULAR; INTRAVENOUS; SUBCUTANEOUS at 01:09

## 2025-01-01 RX ADMIN — HYDROMORPHONE HYDROCHLORIDE 0.5 MILLIGRAM(S): 4 INJECTION, SOLUTION INTRAMUSCULAR; INTRAVENOUS; SUBCUTANEOUS at 23:20

## 2025-01-01 RX ADMIN — Medication 0.63 MILLIGRAM(S): at 02:23

## 2025-01-01 RX ADMIN — ANTISEPTIC SURGICAL SCRUB 1 APPLICATION(S): 0.04 SOLUTION TOPICAL at 12:40

## 2025-01-01 RX ADMIN — HYDROMORPHONE HYDROCHLORIDE 0.5 MILLIGRAM(S): 4 INJECTION, SOLUTION INTRAMUSCULAR; INTRAVENOUS; SUBCUTANEOUS at 23:35

## 2025-01-01 RX ADMIN — PIPERACILLIN SODIUM AND TAZOBACTAM SODIUM 25 GRAM(S): 2; 250 INJECTION, POWDER, FOR SOLUTION INTRAVENOUS at 21:44

## 2025-01-01 RX ADMIN — OXYCODONE HYDROCHLORIDE 10 MILLIGRAM(S): 30 TABLET ORAL at 23:25

## 2025-01-01 RX ADMIN — Medication 25 MILLIGRAM(S): at 05:17

## 2025-01-01 RX ADMIN — HYDROMORPHONE HYDROCHLORIDE 0.5 MILLIGRAM(S): 4 INJECTION, SOLUTION INTRAMUSCULAR; INTRAVENOUS; SUBCUTANEOUS at 07:22

## 2025-01-01 RX ADMIN — DEXAMETHASONE SODIUM PHOSPHATE 102 MILLIGRAM(S): 4 INJECTION, SOLUTION INTRA-ARTICULAR; INTRALESIONAL; INTRAMUSCULAR; INTRAVENOUS; SOFT TISSUE at 01:25

## 2025-01-01 RX ADMIN — ANTISEPTIC SURGICAL SCRUB 1 APPLICATION(S): 0.04 SOLUTION TOPICAL at 12:17

## 2025-01-01 RX ADMIN — IPRATROPIUM BROMIDE AND ALBUTEROL SULFATE 3 MILLILITER(S): .5; 2.5 SOLUTION RESPIRATORY (INHALATION) at 09:42

## 2025-01-01 RX ADMIN — HYDROMORPHONE HYDROCHLORIDE 0.5 MILLIGRAM(S): 4 INJECTION, SOLUTION INTRAMUSCULAR; INTRAVENOUS; SUBCUTANEOUS at 13:14

## 2025-01-01 RX ADMIN — Medication 30 MILLIGRAM(S): at 19:51

## 2025-01-01 RX ADMIN — Medication 2: at 17:51

## 2025-01-01 RX ADMIN — PANTOPRAZOLE 40 MILLIGRAM(S): 20 TABLET, DELAYED RELEASE ORAL at 07:25

## 2025-01-01 RX ADMIN — Medication 0.63 MILLIGRAM(S): at 04:08

## 2025-01-01 RX ADMIN — MONTELUKAST SODIUM 10 MILLIGRAM(S): 5 TABLET, CHEWABLE ORAL at 12:25

## 2025-01-01 RX ADMIN — Medication 1 TABLET(S): at 11:15

## 2025-01-01 RX ADMIN — Medication 8 UNIT(S)/HR: at 23:23

## 2025-01-01 RX ADMIN — HYDROMORPHONE HYDROCHLORIDE 0.5 MILLIGRAM(S): 4 INJECTION, SOLUTION INTRAMUSCULAR; INTRAVENOUS; SUBCUTANEOUS at 09:56

## 2025-01-01 RX ADMIN — Medication 25 MILLIGRAM(S): at 17:16

## 2025-01-01 RX ADMIN — ANTISEPTIC SURGICAL SCRUB 1 APPLICATION(S): 0.04 SOLUTION TOPICAL at 12:24

## 2025-01-01 RX ADMIN — HYDROMORPHONE HYDROCHLORIDE 0.5 MILLIGRAM(S): 4 INJECTION, SOLUTION INTRAMUSCULAR; INTRAVENOUS; SUBCUTANEOUS at 12:53

## 2025-01-01 RX ADMIN — POTASSIUM CHLORIDE 100 MILLIEQUIVALENT(S): 750 TABLET, EXTENDED RELEASE ORAL at 10:12

## 2025-01-01 RX ADMIN — DEXAMETHASONE SODIUM PHOSPHATE 102 MILLIGRAM(S): 4 INJECTION, SOLUTION INTRA-ARTICULAR; INTRALESIONAL; INTRAMUSCULAR; INTRAVENOUS; SOFT TISSUE at 15:53

## 2025-01-01 RX ADMIN — Medication 0.63 MILLIGRAM(S): at 03:38

## 2025-01-01 RX ADMIN — ENOXAPARIN SODIUM 40 MILLIGRAM(S): 100 INJECTION SUBCUTANEOUS at 18:19

## 2025-01-01 RX ADMIN — MONTELUKAST SODIUM 10 MILLIGRAM(S): 5 TABLET, CHEWABLE ORAL at 12:42

## 2025-01-01 RX ADMIN — PANTOPRAZOLE 40 MILLIGRAM(S): 20 TABLET, DELAYED RELEASE ORAL at 06:19

## 2025-01-01 RX ADMIN — OXYCODONE HYDROCHLORIDE 20 MILLIGRAM(S): 30 TABLET ORAL at 07:06

## 2025-01-01 RX ADMIN — POLYETHYLENE GLYCOL 3350 17 GRAM(S): 17 POWDER, FOR SOLUTION ORAL at 17:55

## 2025-01-01 RX ADMIN — PIPERACILLIN SODIUM AND TAZOBACTAM SODIUM 25 GRAM(S): 2; 250 INJECTION, POWDER, FOR SOLUTION INTRAVENOUS at 07:21

## 2025-01-01 RX ADMIN — Medication 2 TABLET(S): at 22:17

## 2025-01-01 RX ADMIN — ACETAMINOPHEN 240 MILLIGRAM(S): 160 SUSPENSION ORAL at 00:34

## 2025-01-01 RX ADMIN — HYDROMORPHONE HYDROCHLORIDE 0.2 MILLIGRAM(S): 4 INJECTION, SOLUTION INTRAMUSCULAR; INTRAVENOUS; SUBCUTANEOUS at 08:01

## 2025-01-01 RX ADMIN — Medication 25 MILLIGRAM(S): at 19:04

## 2025-01-01 RX ADMIN — OXYCODONE HYDROCHLORIDE 20 MILLIGRAM(S): 30 TABLET ORAL at 18:01

## 2025-01-01 RX ADMIN — HYDROMORPHONE HYDROCHLORIDE 0.5 MILLIGRAM(S): 4 INJECTION, SOLUTION INTRAMUSCULAR; INTRAVENOUS; SUBCUTANEOUS at 10:45

## 2025-01-01 RX ADMIN — Medication 50 MILLIGRAM(S): at 05:35

## 2025-01-01 RX ADMIN — Medication 4: at 18:35

## 2025-01-01 RX ADMIN — PIPERACILLIN SODIUM AND TAZOBACTAM SODIUM 25 GRAM(S): 2; 250 INJECTION, POWDER, FOR SOLUTION INTRAVENOUS at 05:05

## 2025-01-01 RX ADMIN — DEXAMETHASONE SODIUM PHOSPHATE 102 MILLIGRAM(S): 4 INJECTION, SOLUTION INTRA-ARTICULAR; INTRALESIONAL; INTRAMUSCULAR; INTRAVENOUS; SOFT TISSUE at 20:06

## 2025-01-01 RX ADMIN — POTASSIUM CHLORIDE 40 MILLIEQUIVALENT(S): 750 TABLET, EXTENDED RELEASE ORAL at 13:14

## 2025-01-01 RX ADMIN — OXYCODONE HYDROCHLORIDE 20 MILLIGRAM(S): 30 TABLET ORAL at 07:02

## 2025-01-01 RX ADMIN — Medication 4: at 18:02

## 2025-01-01 RX ADMIN — TOCILIZUMAB 100 MILLIGRAM(S): 180 INJECTION, SOLUTION SUBCUTANEOUS at 09:42

## 2025-01-01 RX ADMIN — HYDROMORPHONE HYDROCHLORIDE 0.5 MILLIGRAM(S): 4 INJECTION, SOLUTION INTRAMUSCULAR; INTRAVENOUS; SUBCUTANEOUS at 22:11

## 2025-01-01 RX ADMIN — PANTOPRAZOLE 40 MILLIGRAM(S): 20 TABLET, DELAYED RELEASE ORAL at 17:44

## 2025-01-01 RX ADMIN — ACETAMINOPHEN, DIPHENHYDRAMINE HCL, PHENYLEPHRINE HCL 3 MILLIGRAM(S): 325; 25; 5 TABLET ORAL at 22:29

## 2025-01-01 RX ADMIN — DEXAMETHASONE SODIUM PHOSPHATE 4 MILLIGRAM(S): 4 INJECTION, SOLUTION INTRA-ARTICULAR; INTRALESIONAL; INTRAMUSCULAR; INTRAVENOUS; SOFT TISSUE at 05:35

## 2025-01-01 RX ADMIN — Medication 250 MILLILITER(S): at 20:35

## 2025-01-01 RX ADMIN — OXYCODONE HYDROCHLORIDE 10 MILLIGRAM(S): 30 TABLET ORAL at 16:40

## 2025-01-01 RX ADMIN — Medication 40 MILLIGRAM(S): at 18:16

## 2025-01-01 RX ADMIN — Medication 25 MILLIGRAM(S): at 07:38

## 2025-01-01 RX ADMIN — Medication 500000 UNIT(S): at 07:38

## 2025-01-01 RX ADMIN — HYDROMORPHONE HYDROCHLORIDE 0.5 MILLIGRAM(S): 4 INJECTION, SOLUTION INTRAMUSCULAR; INTRAVENOUS; SUBCUTANEOUS at 00:54

## 2025-01-01 RX ADMIN — HYDROMORPHONE HYDROCHLORIDE 0.5 MILLIGRAM(S): 4 INJECTION, SOLUTION INTRAMUSCULAR; INTRAVENOUS; SUBCUTANEOUS at 13:08

## 2025-01-01 RX ADMIN — POLYETHYLENE GLYCOL 3350 17 GRAM(S): 17 POWDER, FOR SOLUTION ORAL at 17:17

## 2025-01-01 RX ADMIN — IPRATROPIUM BROMIDE AND ALBUTEROL SULFATE 3 MILLILITER(S): .5; 2.5 SOLUTION RESPIRATORY (INHALATION) at 03:09

## 2025-01-01 RX ADMIN — ATORVASTATIN CALCIUM 10 MILLIGRAM(S): 80 TABLET, FILM COATED ORAL at 22:15

## 2025-01-01 RX ADMIN — HYDROMORPHONE HYDROCHLORIDE 0.2 MILLIGRAM(S): 4 INJECTION, SOLUTION INTRAMUSCULAR; INTRAVENOUS; SUBCUTANEOUS at 16:30

## 2025-01-01 RX ADMIN — Medication 1 TABLET(S): at 12:25

## 2025-01-01 RX ADMIN — IPRATROPIUM BROMIDE AND ALBUTEROL SULFATE 3 MILLILITER(S): .5; 2.5 SOLUTION RESPIRATORY (INHALATION) at 20:15

## 2025-01-01 RX ADMIN — Medication 0.12 MILLIGRAM(S): at 21:12

## 2025-01-01 RX ADMIN — Medication 2 TABLET(S): at 21:46

## 2025-01-01 RX ADMIN — Medication 0.63 MILLIGRAM(S): at 09:37

## 2025-01-01 RX ADMIN — Medication 0.63 MILLIGRAM(S): at 16:44

## 2025-01-01 RX ADMIN — HYDROMORPHONE HYDROCHLORIDE 0.5 MILLIGRAM(S): 4 INJECTION, SOLUTION INTRAMUSCULAR; INTRAVENOUS; SUBCUTANEOUS at 08:22

## 2025-01-01 RX ADMIN — POTASSIUM CHLORIDE 100 MILLIEQUIVALENT(S): 750 TABLET, EXTENDED RELEASE ORAL at 10:30

## 2025-01-01 RX ADMIN — Medication 10: at 19:26

## 2025-01-01 RX ADMIN — HYDROMORPHONE HYDROCHLORIDE 0.4 MILLIGRAM(S): 4 INJECTION, SOLUTION INTRAMUSCULAR; INTRAVENOUS; SUBCUTANEOUS at 10:05

## 2025-01-01 RX ADMIN — Medication 1 TABLET(S): at 12:42

## 2025-01-01 RX ADMIN — Medication 0.12 MILLIGRAM(S): at 21:44

## 2025-01-01 RX ADMIN — Medication 0.63 MILLIGRAM(S): at 15:32

## 2025-01-01 RX ADMIN — PIPERACILLIN SODIUM AND TAZOBACTAM SODIUM 25 GRAM(S): 2; 250 INJECTION, POWDER, FOR SOLUTION INTRAVENOUS at 21:27

## 2025-01-01 RX ADMIN — PIPERACILLIN SODIUM AND TAZOBACTAM SODIUM 25 GRAM(S): 2; 250 INJECTION, POWDER, FOR SOLUTION INTRAVENOUS at 21:31

## 2025-01-01 RX ADMIN — ATORVASTATIN CALCIUM 10 MILLIGRAM(S): 80 TABLET, FILM COATED ORAL at 21:12

## 2025-01-01 RX ADMIN — Medication 0.63 MILLIGRAM(S): at 21:36

## 2025-01-01 RX ADMIN — PANTOPRAZOLE 40 MILLIGRAM(S): 20 TABLET, DELAYED RELEASE ORAL at 05:35

## 2025-01-01 RX ADMIN — Medication 0.63 MILLIGRAM(S): at 20:03

## 2025-01-01 RX ADMIN — POTASSIUM CHLORIDE 40 MILLIEQUIVALENT(S): 750 TABLET, EXTENDED RELEASE ORAL at 22:13

## 2025-01-01 RX ADMIN — SULFAMETHOXAZOLE AND TRIMETHOPRIM 1 TABLET(S): 400; 80 TABLET ORAL at 12:34

## 2025-01-01 RX ADMIN — ENOXAPARIN SODIUM 30 MILLIGRAM(S): 100 INJECTION SUBCUTANEOUS at 18:50

## 2025-01-01 RX ADMIN — Medication 500000 UNIT(S): at 17:31

## 2025-01-01 RX ADMIN — HYDROMORPHONE HYDROCHLORIDE 0.5 MILLIGRAM(S): 4 INJECTION, SOLUTION INTRAMUSCULAR; INTRAVENOUS; SUBCUTANEOUS at 20:11

## 2025-01-01 RX ADMIN — Medication 10: at 18:23

## 2025-01-01 RX ADMIN — ACETAMINOPHEN 600 MILLIGRAM(S): 160 SUSPENSION ORAL at 10:16

## 2025-01-01 RX ADMIN — DEXAMETHASONE SODIUM PHOSPHATE 4 MILLIGRAM(S): 4 INJECTION, SOLUTION INTRA-ARTICULAR; INTRALESIONAL; INTRAMUSCULAR; INTRAVENOUS; SOFT TISSUE at 07:38

## 2025-01-01 RX ADMIN — MIRTAZAPINE 7.5 MILLIGRAM(S): 30 TABLET, FILM COATED ORAL at 22:17

## 2025-01-01 RX ADMIN — OXYCODONE HYDROCHLORIDE 10 MILLIGRAM(S): 30 TABLET ORAL at 12:20

## 2025-01-01 RX ADMIN — PIPERACILLIN SODIUM AND TAZOBACTAM SODIUM 25 GRAM(S): 2; 250 INJECTION, POWDER, FOR SOLUTION INTRAVENOUS at 05:24

## 2025-01-01 RX ADMIN — Medication 2: at 12:39

## 2025-01-01 RX ADMIN — Medication 0.63 MILLIGRAM(S): at 09:38

## 2025-01-01 RX ADMIN — Medication 50 MILLIGRAM(S): at 07:08

## 2025-01-01 RX ADMIN — HYDROMORPHONE HYDROCHLORIDE 0.2 MILLIGRAM(S): 4 INJECTION, SOLUTION INTRAMUSCULAR; INTRAVENOUS; SUBCUTANEOUS at 21:26

## 2025-01-01 RX ADMIN — PIPERACILLIN SODIUM AND TAZOBACTAM SODIUM 25 GRAM(S): 2; 250 INJECTION, POWDER, FOR SOLUTION INTRAVENOUS at 13:00

## 2025-01-01 RX ADMIN — Medication 50 MILLIGRAM(S): at 06:19

## 2025-01-01 RX ADMIN — Medication 25 MILLIGRAM(S): at 07:25

## 2025-01-01 RX ADMIN — HYDROMORPHONE HYDROCHLORIDE 0.2 MILLIGRAM(S): 4 INJECTION, SOLUTION INTRAMUSCULAR; INTRAVENOUS; SUBCUTANEOUS at 14:01

## 2025-01-01 RX ADMIN — ATORVASTATIN CALCIUM 10 MILLIGRAM(S): 80 TABLET, FILM COATED ORAL at 21:41

## 2025-01-01 RX ADMIN — Medication 0.63 MILLIGRAM(S): at 22:38

## 2025-01-26 NOTE — ED PROVIDER NOTE - OBJECTIVE STATEMENT
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  She had bilateral lower extremity pitting edema after she got off the plane which has actually improved but not resolved.  Patient denies any recent fever, cough, URI symptoms, hemoptysis, abdominal pain.  Denies cough.  No prior history of PE or DVT.

## 2025-01-26 NOTE — H&P ADULT - CONVERSATION DETAILS
Discussed MOLST form, as patient had previously indicated she was DNR during office visit with Dr. Marx. Pt and her daughter confirm she has an existing MOLST form, but it is at her house. New MOLST form filled out today- DNR, DNI but with trial of NiV, no feeding tube. Palliative care consulted for help with pain management in setting of advanced malignancy.    We discussed that hospice would be an option if patient did not want to continue getting any disease modifying therapy for her cancer. Patient shared that when it is time for her to pass, she would want to be home in the [facility]. She has not decided yet if she wants to continue with treatment.

## 2025-01-26 NOTE — H&P ADULT - PROBLEM SELECTOR PLAN 4
, although patient was drinking a frappuccino at the time  - Check A1c in AM  - Hyperglycemia likely also due to steroids  - Moderate ISS qac; low dose ISS qHS  - Holding home metformin  - May need to be started on basal/bolus insulin if FS remain elevated  - Consistent carb diet

## 2025-01-26 NOTE — H&P ADULT - PROBLEM SELECTOR PLAN 1
Chest pain is likely due to worsening metastatic disease noted on CT imaging of the chest and abdomen/pelvis  - CT noted "The right cardiophrenic angle lymph node is also extending through the intercostal space into the right anterior chest wall," and "metastatic implants within the subcutaneous tissues of the anterior chest wall/both breasts."  - Imaging is negative for PE or other acute etiologies to explain patient's pain  - Patient reports adequate pain relief with her home regimen of oxycodone and Oxycontin, but does not take oxycodone overnight  - Will start oxycodone 10mg q3h PRN for mod-severe pain, and continue home Oxycontin 20mg q12h  - Palliative care consulted for help with pain management  - Patient not on bowel regimen at home, due to carcinoid tumor she usually has diarrhea, which has now improved after starting opioid medications for pain- will start Miralax PRN  - Narcan ordered PRN Chest pain is likely due to worsening metastatic disease noted on CT imaging of the chest and abdomen/pelvis  - CT noted "The right cardiophrenic angle lymph node is also extending through the intercostal space into the right anterior chest wall," and "metastatic implants within the subcutaneous tissues of the anterior chest wall/both breasts."  - Imaging is negative for PE or other acute etiologies to explain patient's pain  - EKG reviewed without ischemic changes, troponin x2 without significant change  - Patient reports adequate pain relief with her home regimen of oxycodone and Oxycontin, but does not take oxycodone overnight  - Will start oxycodone 10mg q3h PRN for mod-severe pain, and continue home Oxycontin 20mg q12h  - Palliative care consulted for help with pain management  - Patient not on bowel regimen at home, due to carcinoid tumor she usually has diarrhea, which has now improved after starting opioid medications for pain- will start Miralax PRN  - Narcan ordered PRN

## 2025-01-26 NOTE — H&P ADULT - PROBLEM SELECTOR PLAN 5
Thrush noted on exam. Patient states it has been present for the past 2 months  - Start nystatin swish and spit q6h

## 2025-01-26 NOTE — PATIENT PROFILE ADULT - FALL HARM RISK - HARM RISK INTERVENTIONS

## 2025-01-26 NOTE — ED ADULT NURSE REASSESSMENT NOTE - NS ED NURSE REASSESS COMMENT FT1
Pt laying in stretcher with family member at the bedside. AxOx3. RR even and unlabored. Pt on cardiac monitor, NSR. Safety maintained. Medications given per MD Cheney orders. All questions and concerns answered.

## 2025-01-26 NOTE — ED ADULT NURSE NOTE - NSFALLHARMRISKINTERV_ED_ALL_ED

## 2025-01-26 NOTE — ED PROVIDER NOTE - CLINICAL SUMMARY MEDICAL DECISION MAKING FREE TEXT BOX
patient  was mildly tachycardic in triage to 118, although upon evaluation in the room heart rate is 84.  BP was within normal limits.  Mildly hypoxic to 91-92% on room air, improved to 9% on 2 L nasal cannula.   She is currently in no distress, although appears mildly  tachypneic and chronically ill-appearing.  Nontoxic. differential diagnoses include   Pulmonary embolism ( given her history of active cancer and recent long distance flights to and from the [facility])  versus progressing lung malignancy burden versus pneumonia versus pneumothorax versus anemia versus renal failure.  Labs, CT.  Will require admission for acute hypoxic respiratory failure regardless of cause.

## 2025-01-26 NOTE — H&P ADULT - PROBLEM SELECTOR PLAN 2
Worsening metastatic disease noted on CT imaging. Most recently was on everolimus- patient's daughter reports patient is no longer on any kind of treatment. Plan was to follow up with Dr. Marx outpatient to discuss other treatment options once patient returned to the [place]  - Inpatient oncology evaluation  - CT findings discussed with patient and her daughter at bedside  - C/w dexamethasone 4mg qd- patient did not tolerate SE of increased dose of 4mg BID  - C/w Protonix for GI ppx  - Currently on supplemental 2L O2 for O2 sat of 91-92% on RA- will need to check ambulatory O2 saturation to assess need for home oxygen  - Patient appears cachectic with severe protein-calorie malnutrition- nutrition consulted

## 2025-01-26 NOTE — PATIENT PROFILE ADULT - NSPROMEDSBROUGHTTOHOSP_GEN_A_NUR
[facility]    Danika Byrne  was seen and treated by me on 11/16/18 for  pregnancy    [] May return to normal duty on .    [x] May return to restricted duty on 11/16/18.    [] Please excuse for the following days    Restrictions or Limitations:    [] Lifting limited to  pounds.    [] Standing limited to  hours.    [] Walking limited    [] Work  hours per day, or  hours per week.    [] No repetitive bending    [] No lifting at all    [x] Other    May not participate in physical training till released from pregnancy    [] Off work until further notice.    Date of next examination: Visit date not found    [] The above patient has requested a return to work slip. I have not seen the patient during her illness/injury, nor have I authorized or recommended that the patient be absent from work for any specific period of time. I am giving this patient permission to return to work on the basis of the patient's statement to me.        ________________________   ___________________________________   Date     Dr. Jose Koo   no

## 2025-01-26 NOTE — ED PROVIDER NOTE - PROGRESS NOTE DETAILS
Joseph Cheney, ED attending:   CT pulmonary angiogram negative for PE.  CT shows pre-existing known metastatic cancer burden.  CT head shows no new mass or bleed, although unable to evaluate for the subcentimeter brain mass seen on MRI previously.  Patient is mildly hypokalemic.  Given 40 mill equivalents oral potassium repletion.  Initiated IV potassium patient, but patient was unable to help tolerate due to pain, even after slowing down the fluids and running it with LR.  Will discontinue IV potassium at this time.  Lower extremity duplex negative for DVTs.  Will  discussed with oncology fellow and admit for hypoxic respiratory failure. Joseph Cheney, ED attending: Case d/w Dr. Orellana, Oncology fellow. Patient's lactate remains 5.0, down from 8.5 initially but lateral to second measurements of lactate.  Despite the persistently elevated lactate, this patient is nontoxic-appearing with normal blood pressure, normal heart rate, and no fever.  Her elevated lactate is more likely related to her significant cancer burden and not to active septic infection.

## 2025-01-26 NOTE — ED ADULT NURSE REASSESSMENT NOTE - NS ED NURSE REASSESS COMMENT FT1
Pt received from [facility]. Pt laying comfortably in stretcher with family member at the bedside. Respirations even and unlabored. Pt on cardiac monitor, NSR. NAD. Pt denies any pain at this time. Safety maintained. All questions and concerns answered.

## 2025-01-26 NOTE — H&P ADULT - NSICDXPASTMEDICALHX_GEN_ALL_CORE_FT
PAST MEDICAL HISTORY:  Cancer with pulmonary metastases     Diabetes mellitus     Former smoker 1/2 ppd x 1yr    Hyperlipidemia     Hypertension     Neuroendocrine neoplasm of lung     Thyroid cancer

## 2025-01-26 NOTE — H&P ADULT - HISTORY OF PRESENT ILLNESS
64 year old female with a history of lung neuroendocrine tumor with pulmonary, bone and brain metastases, s/p WBRT in 11/2024, most recently on everolimus, papillary thyroid carcinoma s/p partial thyroidectomy, HTN, HLD presenting with worsening SOB and chest pain. Patient's daughter assisted with history. Patient recently returned from a 2 month trip to the [facility]- returned 3 days ago. During the past 2 months she has had worsening fatigue and low appetite, as well as worsening balance/gait. She has had worsening pain especially in her L rib area and back. Oxycodone helps with the pain when she takes it- usually takes it about every 4-5 hours during the day as needed. Overnight she usually tries to go without taking the oxycodone but wakes up in pain. Denies constipation- previously used to have diarrhea but now has normal BMs while on narcotics. Pt previously was on dexamethasone 4mg BID at the start of her trip, but then noticed worsening swelling in her legs and developed moon facies and spoke with her oncology team who recommended decreasing the dose to 4mg qd. Patient denies fevers but does often feel cold; denies nausea or abdominal pain, no URI symptoms.

## 2025-01-26 NOTE — ED ADULT NURSE REASSESSMENT NOTE - NS ED NURSE REASSESS COMMENT FT1
Pt endorses intermittent chest pressure. Repeat EKG, VBG, and troponin drawn. Pt on cardiac monitor, NSR. RR even and unlabored. NAD. MD Cheney aware.

## 2025-01-26 NOTE — H&P ADULT - ASSESSMENT
64 year old female with a history of lung neuroendocrine tumor with pulmonary, bone and brain metastases, s/p WBRT in 11/2024, most recently on everolimus, papillary thyroid carcinoma s/p partial thyroidectomy, HTN, HLD presenting with worsening SOB and chest pain, likely due to worsening metastatic disease.

## 2025-01-26 NOTE — ED ADULT NURSE REASSESSMENT NOTE - NS ED NURSE REASSESS COMMENT FT1
Break Coverage: Pt is awake, a/o x 3. appears comfortable and in no apparent distress. History of Lung and Thyroid cancer. Experiencing SOB for several days. Placed on oxygen 2L via NC and tolerating well. States, she doesn't use oxygen at home. Denies chest pain, headache, nausea, dizziness, vomiting, fever or chills. Pt has 20g iv placed to right AC with no redness or swelling noted. Awaiting further orders.

## 2025-01-26 NOTE — H&P ADULT - NSHPLABSRESULTS_GEN_ALL_CORE
10.6   11.33 )-----------( 176      ( 26 Jan 2025 10:48 )             32.9     01-26    144  |  101  |  7   ----------------------------<  285[H]  3.3[L]   |  25  |  0.44[L]    Ca    8.3[L]      26 Jan 2025 10:48  Mg     2.30     01-26    TPro  5.3[L]  /  Alb  3.0[L]  /  TBili  0.6  /  DBili  x   /  AST  19  /  ALT  18  /  AlkPhos  70  01-26    Lactate 8.5 --> 5    Troponin 25 --> 22    RVP negative    EKG personally reviewed: NSR, no ischemic changes    < from: CT Abdomen and Pelvis w/ IV Cont (01.26.25 @ 11:13) >    IMPRESSION: No pulmonary embolus is noted.    Findings suggestive of bilateral pulmonary metastases, hepatic   metastases, right adrenal gland metastasis as well as metastatic implants   within the subcutaneous tissues of the anterior chest wall/both breasts.    Mediastinal/bilateral hilar adenopathy.

## 2025-01-26 NOTE — H&P ADULT - CONSTITUTIONAL
MILD - TO FOLLOW - IF HE HAS CAT SURGERY WOULD RECOM EVAL AND ANTI-VEGF TX FIRST TO BE ON THE SAFE SIDE. well-groomed/no distress/cachectic

## 2025-01-26 NOTE — H&P ADULT - PROBLEM SELECTOR PLAN 3
Lactate elevated to 8.5 on VBG, however without acidosis noted  - Improved to 5.1 on repeat VBG, still without acidosis  - Likely Type B lactic acidosis in setting of malignancy  - Check lactate s/p IVF this evening to ensure it is not increasing, but suspect it will remain elevated despite adequate perfusion

## 2025-01-26 NOTE — ED PROVIDER NOTE - ATTENDING CONTRIBUTION TO CARE
I have discussed the patient's case presentation with resident. I have also personally performed a face-to-face evaluation of the patient. I agree with the resident's assessment and plan.      patient  was mildly tachycardic in triage to 118, although upon evaluation in the room heart rate is 84.  BP was within normal limits.  Mildly hypoxic to 91-92% on room air, improved to 9% on 2 L nasal cannula.   She is currently in no distress, although appears mildly  tachypneic and chronically ill-appearing.  Nontoxic. differential diagnoses include   Pulmonary embolism ( given her history of active cancer and recent long distance flights to and from the [facility])  versus progressing lung malignancy burden versus pneumonia versus pneumothorax versus anemia versus renal failure.  Labs, CT.  Will require admission for acute hypoxic respiratory failure regardless of cause.    CC time: [facility]

## 2025-01-26 NOTE — ED PROVIDER NOTE - PHYSICAL EXAMINATION
Physical Exam  GEN: Alert and oriented x 3, in no acute distress, speaking full clear sentences but mildly tachypneic but not in resp distress  HEENT: NC/AT, PERRL, EOMI, normal oropharynx  NECK: Supple, nontender, FROM  CV: RRR, no m/r/g  PULM:   Trace crackles to bilateral bases  ABD: Soft, nontender, nondistended. No organomegaly  EXTR: FROM to all extremities, nontender, 1+ bilat pitting edema ankles to knees  SKIN: Warm, dry, no rash  NEURO: AOx3, speaking full clear sentences, SZYMANSKI 5/5 strength

## 2025-01-26 NOTE — ED ADULT TRIAGE NOTE - PAIN RATING/NUMBER SCALE (0-10): ACTIVITY
Detail Level: Detailed
Render In Strict Bullet Format?: No
Initiate Treatment: Ketoconazole cream
9 (severe pain)

## 2025-01-26 NOTE — H&P ADULT - MUSCULOSKELETAL
ROM intact/no joint swelling/no calf tenderness/strength 5/5 bilateral upper extremities/strength 5/5 bilateral lower extremities

## 2025-01-26 NOTE — ED ADULT NURSE NOTE - OBJECTIVE STATEMENT
Pt seen in room 2. 64 year old female c/o SOB x multiple days. Family member at the bedside providing additional information. Pt states SOB has been occuring for days but has progressively worsened. Pt endorses traveling to [facility] and getting last week with SOB and generalized swelling. Daughter states pt was experiencing back pain this morning and was given her prescribed daily doses of Oxycontin and oxycodone @ 9:45. Pt denies any pain at this time. Pt endorses additional body aches. Pt denies fever, chills, chest pain, numbness/tingling.    AxOx3 and ambulatory at baseline. Pt nonambulatory due to weakness. Respirations even and unlabored. Lung sounds clear to auscultation. Pt on cardiac monitor, NSR. S1 and S2 noted, rate and rhythm regular. Abdomen soft, nondistended, and nontender. Normoactive bowel sounds for all 4 quadrants. Bilateral lower extremity swelling noted. Bruising on the right big toe noted. 20G IV placed in the left AC. Safety maintained. All questions and concerns answered. Pending ultrasound. Pt seen in room 2. 64 year old female c/o SOB x multiple days. Family member at the bedside providing additional information. Pt states SOB has been occuring for days but has progressively worsened. Pt endorses traveling to [facility] and getting last week with SOB and generalized swelling. Daughter states Pt was experiencing back pain this morning and was given her prescribed daily doses of Oxycontin and oxycodone @ 9:45. Pt denies any pain at this time. Pt endorses additional body aches. Pt denies fever, chills, chest pain, numbness/tingling.    AxOx3 and ambulatory at baseline. Pt nonambulatory due to weakness. Respirations even and unlabored. Lung sounds clear to auscultation. Pt on cardiac monitor, NSR. S1 and S2 noted, rate and rhythm regular. Abdomen soft, nondistended, and nontender. Normoactive bowel sounds for all 4 quadrants. Bilateral lower extremity swelling noted. Bruising on the right big toe noted. 20G IV placed in the left AC. Safety maintained. All questions and concerns answered. Pending ultrasound.

## 2025-01-27 PROBLEM — R91.8 OTHER NONSPECIFIC ABNORMAL FINDING OF LUNG FIELD: Chronic | Status: INACTIVE | Noted: 2021-09-09 | Resolved: 2025-01-01

## 2025-01-27 NOTE — DIETITIAN INITIAL EVALUATION ADULT - PERTINENT LABORATORY DATA
01-27    144  |  102  |  6[L]  ----------------------------<  135[H]  3.4[L]   |  29  |  0.32[L]    Ca    8.3[L]      27 Jan 2025 05:44  Phos  2.5     01-27  Mg     2.10     01-27    TPro  5.0[L]  /  Alb  3.0[L]  /  TBili  0.7  /  DBili  x   /  AST  28  /  ALT  19  /  AlkPhos  74  01-27  POCT Blood Glucose.: 263 mg/dL (01-27-25 @ 08:28)  A1C with Estimated Average Glucose Result: 7.7 % (01-27-25 @ 05:44)  A1C with Estimated Average Glucose Result: 6.2 % (03-13-24 @ 06:00)

## 2025-01-27 NOTE — DIETITIAN INITIAL EVALUATION ADULT - ORAL INTAKE PTA/DIET HISTORY
Patient seen at bedside with her  present. Patient reports no known food allergies or food intolerances. Nutrition supplementation at home includes Ensure and vitamin B-complex. Patient wears upper and lower dentures (present in-house), reports chewing and swallowing difficulty at baseline. Notes she has had a poor appetite, poor PO intake for >1 month. Consuming very small bites of food during this timeframe, likely meeting <75% estimated energy needs as a result.

## 2025-01-27 NOTE — DIETITIAN NUTRITION RISK NOTIFICATION - ADDITIONAL COMMENTS/DIETITIAN RECOMMENDATIONS
Please see Dietitian Initial Assessment for complete recommendations.    Aneta Price MS RDN CDN  On [employer] Teams (Preferred), Pager #[phone]

## 2025-01-27 NOTE — PROGRESS NOTE ADULT - SUBJECTIVE AND OBJECTIVE BOX
Patient is a 64y old  Female who presents with a chief complaint of Shortness of breath and chest pain (27 Jan 2025 13:46)      SUBJECTIVE / OVERNIGHT EVENTS: No acute events overnight.  at bedside. Pt complaining of some chest tightness, says this is chronic. Still has some shortness of breath    ADDITIONAL REVIEW OF SYSTEMS:    MEDICATIONS  (STANDING):  atorvastatin 10 milliGRAM(s) Oral at bedtime  carvedilol 25 milliGRAM(s) Oral every 12 hours  chlorhexidine 2% Cloths 1 Application(s) Topical daily  dexAMETHasone     Tablet 4 milliGRAM(s) Oral daily  dextrose 5%. 1000 milliLiter(s) (50 mL/Hr) IV Continuous <Continuous>  dextrose 5%. 1000 milliLiter(s) (100 mL/Hr) IV Continuous <Continuous>  dextrose 50% Injectable 25 Gram(s) IV Push once  dextrose 50% Injectable 12.5 Gram(s) IV Push once  dextrose 50% Injectable 25 Gram(s) IV Push once  enoxaparin Injectable 30 milliGRAM(s) SubCutaneous every 24 hours  glucagon  Injectable 1 milliGRAM(s) IntraMuscular once  influenza  Vaccine (HIGH DOSE) 0.5 milliLiter(s) IntraMuscular once  insulin lispro (ADMELOG) corrective regimen sliding scale   SubCutaneous at bedtime  insulin lispro (ADMELOG) corrective regimen sliding scale   SubCutaneous three times a day before meals  losartan 50 milliGRAM(s) Oral daily  mirtazapine 7.5 milliGRAM(s) Oral at bedtime  montelukast 10 milliGRAM(s) Oral daily  multivitamin 1 Tablet(s) Oral daily  nystatin    Suspension 775022 Unit(s) Oral every 6 hours  oxyCODONE  ER Tablet 20 milliGRAM(s) Oral <User Schedule>  pantoprazole    Tablet 40 milliGRAM(s) Oral before breakfast  polyethylene glycol 3350 17 Gram(s) Oral two times a day  senna 2 Tablet(s) Oral at bedtime  trimethoprim  160 mG/sulfamethoxazole 800 mG 1 Tablet(s) Oral <User Schedule>    MEDICATIONS  (PRN):  acetaminophen     Tablet .. 650 milliGRAM(s) Oral every 6 hours PRN Temp greater or equal to 38C (100.4F), Mild Pain (1 - 3)  bisacodyl 5 milliGRAM(s) Oral every 12 hours PRN Constipation  dextrose Oral Gel 15 Gram(s) Oral once PRN Blood Glucose LESS THAN 70 milliGRAM(s)/deciliter  melatonin 3 milliGRAM(s) Oral at bedtime PRN Insomnia  metoclopramide Injectable 10 milliGRAM(s) IV Push every 8 hours PRN Nausea/vomiting  naloxone Injectable 0.1 milliGRAM(s) IV Push every 3 minutes PRN For ANY of the following changes in patient status:  A. RR LESS THAN 10 breaths per minute, B. Oxygen saturation LESS THAN 90%, C. Sedation score of 6  oxyCODONE    IR 10 milliGRAM(s) Oral every 3 hours PRN Moderate-severe pain      CAPILLARY BLOOD GLUCOSE      POCT Blood Glucose.: 264 mg/dL (27 Jan 2025 12:20)  POCT Blood Glucose.: 263 mg/dL (27 Jan 2025 08:28)  POCT Blood Glucose.: 208 mg/dL (26 Jan 2025 23:13)  POCT Blood Glucose.: 369 mg/dL (26 Jan 2025 19:25)  POCT Blood Glucose.: 312 mg/dL (26 Jan 2025 17:52)    I&O's Summary      PHYSICAL EXAM:  Vital Signs Last 24 Hrs  T(C): 36.7 (27 Jan 2025 11:59), Max: 37 (27 Jan 2025 05:30)  T(F): 98.1 (27 Jan 2025 11:59), Max: 98.6 (27 Jan 2025 05:30)  HR: 72 (27 Jan 2025 11:59) (70 - 97)  BP: 137/58 (27 Jan 2025 11:59) (135/67 - 150/70)  BP(mean): --  RR: 18 (27 Jan 2025 11:59) (13 - 20)  SpO2: 96% (27 Jan 2025 11:59) (95% - 97%)    Parameters below as of 27 Jan 2025 11:59  Patient On (Oxygen Delivery Method): nasal cannula  O2 Flow (L/min): 2    CONSTITUTIONAL: NAD, cachectic   ENMT: Moist oral mucosa, no pharyngeal injection or exudates; normal dentition  NECK: Supple, no palpable masses; no thyromegaly  RESPIRATORY: Normal respiratory effort; lungs are clear to auscultation bilaterally  CARDIOVASCULAR: Regular rate and rhythm, normal S1 and S2, no murmur/rub/gallop; No lower extremity edema; Peripheral pulses are 2+ bilaterally  ABDOMEN: Nontender to palpation, normoactive bowel sounds, no rebound/guarding; No hepatosplenomegaly  MUSCULOSKELETAL:  No clubbing or cyanosis of digits; no joint swelling or tenderness to palpation  PSYCH: A+O to person, place, and time; affect appropriate  NEUROLOGY: CN 2-12 are intact and symmetric; no gross sensory deficits   SKIN: No rashes; no palpable lesions    LABS:                        10.8   9.73  )-----------( 181      ( 27 Jan 2025 05:44 )             31.9     01-27    144  |  102  |  6[L]  ----------------------------<  135[H]  3.4[L]   |  29  |  0.32[L]    Ca    8.3[L]      27 Jan 2025 05:44  Phos  2.5     01-27  Mg     2.10     01-27    TPro  5.0[L]  /  Alb  3.0[L]  /  TBili  0.7  /  DBili  x   /  AST  28  /  ALT  19  /  AlkPhos  74  01-27    PT/INR - ( 26 Jan 2025 10:48 )   PT: 11.9 sec;   INR: 1.00 ratio         PTT - ( 26 Jan 2025 10:48 )  PTT:24.0 sec      Urinalysis Basic - ( 27 Jan 2025 05:44 )    Color: x / Appearance: x / SG: x / pH: x  Gluc: 135 mg/dL / Ketone: x  / Bili: x / Urobili: x   Blood: x / Protein: x / Nitrite: x   Leuk Esterase: x / RBC: x / WBC x   Sq Epi: x / Non Sq Epi: x / Bacteria: x          RADIOLOGY & ADDITIONAL TESTS:  Results Reviewed:   Imaging Personally Reviewed:  Electrocardiogram Personally Reviewed:    COORDINATION OF CARE:  Care Discussed with Consultants/Other Providers [Y/N]:  Prior or Outpatient Records Reviewed [Y/N]:

## 2025-01-27 NOTE — DIETITIAN INITIAL EVALUATION ADULT - DIET TYPE
+Recommend SLP evaluation to optimize diet, follow recommendations  --> Liberalize diet to Consistent Carbohydrate {No Snacks} to promote PO intake/high fiber

## 2025-01-27 NOTE — PROGRESS NOTE ADULT - ASSESSMENT
64 year old female with a history of lung neuroendocrine tumor with pulmonary, bone and brain metastases, s/p WBRT in 11/2024, most recently on everolimus, papillary thyroid carcinoma s/p partial thyroidectomy, HTN, HLD presenting with worsening SOB and chest pain, likely due to worsening metastatic disease.    Diagnoses:   Neuroendocrine tumor of the lung metastatic to brain and bone on immunotherapy   Immunocompromised state  Hypercoagulable state secondary to neuroendocrine tumor   Severe protein-calorie malnutrition  Steroid induced hyperglycemia   Elevated lactate   Hypokalemia   HTN   Oral thrush   Opioid induced constipation   Edema

## 2025-01-27 NOTE — DIETITIAN INITIAL EVALUATION ADULT - ADD RECOMMEND
1. Defer fluid management to medical team   2. Monitor weights, labs, BM's, skin integrity, PO intake   3. Please obtain height measurements for current admission   4. Please monitor % PO intake on flowsheets

## 2025-01-27 NOTE — CONSULT NOTE ADULT - ASSESSMENT
64 year old female with a history of lung neuroendocrine tumor with pulmonary, bone and brain metastases, s/p WBRT in 11/2024, most recently on everolimus, papillary thyroid carcinoma s/p partial thyroidectomy, HTN, HLD presenting with worsening SOB and chest pain  Palliative Care consulted for complex decision making  related to goals of care discussions in the setting of advanced illness/ evaluation and management of pain/ symptoms

## 2025-01-27 NOTE — DIETITIAN INITIAL EVALUATION ADULT - OTHER CALCULATIONS
IBW: 96 lb +/- 10%, %IBW 94%  Per Soy CARMEN, noted the following weight history: 40.8kg (1/26), 45.4kg (10/20), 46.5kg (8/13)  Objective weight measurements suggest 4.3kg (10%) weight loss x3 months period of time (significant)

## 2025-01-27 NOTE — DIETITIAN INITIAL EVALUATION ADULT - OTHER INFO
Per chart, patient is a 64y Female with PMH lung neuroendocrine tumor with pulmonary, bone, and brain metastases, papillary thyroid carcinoma s/p partial thyroidectomy, HTN, HLD who presented to [facility] with worsening SOB and chest pain, likely due to worsening metastatic disease.    Patient reports chewing and swallowing difficulty with regular solids and thin liquids. Notes she coughs after swallowing sips of water. Recommend SLP evaluation to optimize diet, follow recommendations. Provider made aware via Text Page. Patient is currently ordered for a PO diet. Patient reports a poor appetite and PO intake at meals during course of admission. Amenable to receive Ensure Max supplementation to support nutrition status, as acceptable per SLP. No report of GI distress (nausea, vomiting, diarrhea, constipation). Last BM 1/26/25 per RN flowsheet documentation. Noted to be on a bowel regimen. Noted supplementation with a multivitamin per review of medication list. Noted HbA1c 7.7% (1/27).    Writer provided verbal education regarding current diet order and nutrition recommendations for after discharge, including strategies to promote PO intake in the setting of poor appetite. Patient and  verbalized understanding to the discussion.

## 2025-01-27 NOTE — CONSULT NOTE ADULT - PROBLEM SELECTOR RECOMMENDATION 2
- Home regimen: Oxy ER 20mg BID, Oxy IR 10mg PRN   - In past 24 hours, received Oxy IR 10mg x1   - PO OxyCONTIN (Oxy ER) 20mg q12  - PO Oxycodone IR 10mg q4 PRN moderate/severe pain (hold for hypotension, oversedation, respiratory depression)  - Bowel regimen while on opiates

## 2025-01-27 NOTE — CONSULT NOTE ADULT - PROBLEM SELECTOR RECOMMENDATION 5
Case reviewed with primary team  Thank you for allowing us to participate in your patient's care. Please page [phone] for any questions/concerns.

## 2025-01-27 NOTE — PROGRESS NOTE ADULT - PROBLEM SELECTOR PLAN 1
Chest pain is likely due to worsening metastatic disease noted on CT imaging of the chest and abdomen/pelvis  - CT noted "The right cardiophrenic angle lymph node is also extending through the intercostal space into the right anterior chest wall," and "metastatic implants within the subcutaneous tissues of the anterior chest wall/both breasts."  - Imaging is negative for PE or other acute etiologies to explain patient's pain  - EKG reviewed without ischemic changes, troponin x2 without significant change  - Patient reports adequate pain relief with her home regimen of oxycodone and Oxycontin, but does not take oxycodone overnight  - Will start oxycodone 10mg q3h PRN for mod-severe pain, and continue home Oxycontin 20mg q12h  - Palliative care consulted for help with pain management  - Patient not on bowel regimen at home, 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

## 2025-01-27 NOTE — GOALS OF CARE CONVERSATION - ADVANCED CARE PLANNING - CONVERSATION DETAILS
Introduced to patient and her  role of maxwell/pal team for her care.    Pt has intact and good social support. She resides in an apt with her spouse, dtr, son in law and 3 grandchildren. Her dtr is a RN with visiting nurse service and her son in law is a HD nurse. She has 2 sons and extended family living in the [facility]. She recently arrived back in [place] after a 2 month visit to the [facility].    Counseled patient regarding role of HCP.Shared with patient that a health care proxy is a legal document that appoints someone else to serve as your health care agent regarding healthcare decisions when you cannot do so for yourself. A HCP speaks on your behalf, expresses your wishes, and has the authority to make medical decisions. A HCP becomes your voice for all of your healthcare decisions.  Provided reassurance that patient will continue to make their own medical decisions and that a proxy would substitute only in the event that the patient was incapable of doing so.   Patient amenable to completing HCP paperwork. She appointed the following: spouse, Hebert Slater ([phone]) as her primary HCP and her daughter, Rachel Meehan ([phone]) as her alternate HCP.     During the past 2 months she has had worsening fatigue and low appetite, as well as worsening balance/gait. Patient and her  share they aware that she has had progression of disease from recent imaging. Pt shared that she needs to discuss her cancer treatment options with Dr. Marx. They share that while the hope is that the treatments will be curative they are aware it is likely not the case given progression of disease. Reviewed that in setting of metastatic disease, intent of treatment is likely palliative in nature (rather than curative). She shared that she would need to have clarification and explanation about any proposed treatment options before she can decide if she is interested in further DMT.     Patient confirms she is DNR/DNI.

## 2025-01-27 NOTE — DIETITIAN INITIAL EVALUATION ADULT - PERTINENT MEDS FT
MEDICATIONS  (STANDING):  atorvastatin 10 milliGRAM(s) Oral at bedtime  carvedilol 25 milliGRAM(s) Oral every 12 hours  chlorhexidine 2% Cloths 1 Application(s) Topical daily  dexAMETHasone     Tablet 4 milliGRAM(s) Oral daily  dextrose 5%. 1000 milliLiter(s) (50 mL/Hr) IV Continuous <Continuous>  dextrose 5%. 1000 milliLiter(s) (100 mL/Hr) IV Continuous <Continuous>  dextrose 50% Injectable 25 Gram(s) IV Push once  dextrose 50% Injectable 12.5 Gram(s) IV Push once  dextrose 50% Injectable 25 Gram(s) IV Push once  enoxaparin Injectable 30 milliGRAM(s) SubCutaneous every 24 hours  glucagon  Injectable 1 milliGRAM(s) IntraMuscular once  influenza  Vaccine (HIGH DOSE) 0.5 milliLiter(s) IntraMuscular once  insulin lispro (ADMELOG) corrective regimen sliding scale   SubCutaneous at bedtime  insulin lispro (ADMELOG) corrective regimen sliding scale   SubCutaneous three times a day before meals  losartan 50 milliGRAM(s) Oral daily  montelukast 10 milliGRAM(s) Oral daily  multivitamin 1 Tablet(s) Oral daily  nystatin    Suspension 467312 Unit(s) Oral every 6 hours  oxyCODONE  ER Tablet 20 milliGRAM(s) Oral <User Schedule>  pantoprazole    Tablet 40 milliGRAM(s) Oral before breakfast    MEDICATIONS  (PRN):  acetaminophen     Tablet .. 650 milliGRAM(s) Oral every 6 hours PRN Temp greater or equal to 38C (100.4F), Mild Pain (1 - 3)  dextrose Oral Gel 15 Gram(s) Oral once PRN Blood Glucose LESS THAN 70 milliGRAM(s)/deciliter  melatonin 3 milliGRAM(s) Oral at bedtime PRN Insomnia  metoclopramide Injectable 10 milliGRAM(s) IV Push every 8 hours PRN Nausea/vomiting  naloxone Injectable 0.1 milliGRAM(s) IV Push every 3 minutes PRN For ANY of the following changes in patient status:  A. RR LESS THAN 10 breaths per minute, B. Oxygen saturation LESS THAN 90%, C. Sedation score of 6  oxyCODONE    IR 10 milliGRAM(s) Oral every 3 hours PRN Moderate-severe pain  polyethylene glycol 3350 17 Gram(s) Oral daily PRN Constipation

## 2025-01-27 NOTE — DIETITIAN INITIAL EVALUATION ADULT - NS FNS DIET ORDER
Regular: Consistent Carbohydrate {Evening Snack} (CSTCHOSN)  DASH/TLC {Sodium & Cholesterol Restricted} (DASH) (01-26-25 @ 18:18)

## 2025-01-27 NOTE — CHART NOTE - NSCHARTNOTEFT_GEN_A_CORE
Palliative Care Biopsychosocial Assessment:     Discussed patient in morning Interdisciplinary rounds   Patient identified for need of assessment by Palliative Care . Pt is a 64 year old female with a history of lung neuroendocrine tumor with pulmonary, bone and brain metastases, s/p WBRT in 11/2024, most recently on everolimus, papillary thyroid carcinoma s/p partial thyroidectomy, HTN, HLD presenting with worsening SOB and chest pain. She returned a few days ago from a 2 month visit to the [facility]. During the past 2 months she has had worsening fatigue and low appetite, as well as worsening balance/gait. She has had worsening pain especially in her L rib area and back. Pt shared that she needs to discuss her cancer treatment options with Dr. Marx. She shared that she would need to have clarification and explanation about any proposed treatment options before she can decide if she is interested in further DMT. Per [facility] discussion with Dr. Salazar upon admission to [facility], "We discussed that hospice would be an option if patient did not want to continue getting any disease modifying therapy for her cancer. Patient shared that when it is time for her to pass, she would want to be home in the [facility]. She has not decided yet if she wants to continue with treatment"    Patient Mental Status:   [ X  ]   alert     [ X  ]  oriented   [    ]  confused   [   ] lethargic  [   ]   non-responsive        Patient Coping Status:     [  X ] coping well    [   ] coping with  some difficulty    [   ] difficulty coping   [   ] Unable to assess due to mental status                                                 Patient Emotional Status:   [   ] anxious   [   ] depressed    [   ] overwhelmed    [   ] angry   [ X  ]accepting    [   ] not accepting          Advance Directives:     Counseled patient regarding role of HCP and provided reassurance that patient will continue to make their own medical decisions and that a proxy would substitute only in the event that the patient was incapable of doing so. Patient amenable to completing HCP paperwork. Pt appointed her spouse, Hebert Slater ([phone]) as her primary HCP and her daughter, Rachel Meehan ([phone]) as her alternate HCP.     Pt completed MOLST on 1/26/2025 for DNR/DNI with trial of non invasive ventilation and no PEG    [   ]    Health Care Surrogate:                            [ X  ]  Health Care Proxy:  [ X  ]  MOLST  [   ]    Living Will  [  X ]  DNR  [  X ]  DNI/trial of non invasive ventilation     Social Support: Evaluated patients social support system. Pt has intact and good social support. She resides in an apt with her spouse, dtr, son in law and 3 grandchildren. Her dtr is a RN with visiting nurse service and her son in law is a HD nurse. She has 2 sons and extended family living in the [facility]. She recently arrived back in [place] after a 2 month visit to the [facility].     Patient Needs:  [   ] Supportive Counseling      [   ] Family Meeting     [   ] Education     [   ] Advance Care Planning  [ X ] oncology input       Caregiver needs:   [   ]  Supportive Counseling    [   ] Family Conference     [   ] Education      Referral:    [   ]  Community Resources    [   ]  Cancer Support Group     [   ]  Hospice     [   ]  Bereavement support     [   ]   Pastoral Care      [   ]  Live On [place]    [   ]  Child Life Services   [   ]  Caregiver Support Group   [  ] Supportive Oncology  [   ] Behavioral Health   [ X  ]  No referrals made at this time      Palliative Care  will continue to remain available as needed for continued psychosocial and emotional support.  Emphatic and active listening provided.       Anny Callaway LMSW  Palliative Care   Geriatrics and Palliative Care Division at [facility]  Pager #[phone]

## 2025-01-27 NOTE — CHART NOTE - NSCHARTNOTEFT_GEN_A_CORE
Post Fall Assessment    Code fall called by RN ~1840, s/p unwitnessed fall, patient noted near bed on floor, Pt seen and examined at bedside. Patient reports she herself got up to go to bathroom and while coming back to bed "I tripped on something and fell"   Pt states did/did not hit head, reports no LOC, Patient denies CP, palpitations, headaches, dizziness, hip / back pain, abdominal pain.   Patient reports pain to Right ankle/foot, denies numbness/tingling. VSS, afebrile, placed back on 2L NC    Recent Vital Signs:   Vital Signs Last 24 Hrs  T(C): 36.2 (27 Jan 2025 16:57), Max: 37 (27 Jan 2025 05:30)  T(F): 97.2 (27 Jan 2025 16:57), Max: 98.6 (27 Jan 2025 05:30)  HR: 96 (27 Jan 2025 16:57) (72 - 97)  BP: 138/60 (27 Jan 2025 16:57) (137/58 - 147/72)  BP(mean): --  RR: 19 (27 Jan 2025 16:57) (17 - 19)  SpO2: 100% (27 Jan 2025 16:57) (95% - 100%)    Parameters below as of 27 Jan 2025 16:57  Patient On (Oxygen Delivery Method): nasal cannula  O2 Flow (L/min): 2    Physical Exam:  General: NAD, cachectic   Mental Status: A&O x3  Skin: Warm, dry, right dorsal foot with diffused erythema, otherwise no rashes/lesions/bruises  Head: NCAT  Neuro: grossly non focal  Cardiac: S1/S2, no murmur/gallop, peripheral pulses 2+ bilaterally  Lungs: CTA b/l  Abdomen: Soft, non distended, non tender, normoactive BS  Extremities: No clubbing or cyanosis of digits, +1 foot edema b/l, Full ROM all 4 extremities.  +erythema noted on dorsal right foot/mid ankle area, 2+ pedal pulses bilaterally. Full sensation and ROM in all extremities,    1) Unwitnessed fall  - CT head ordered   - Xray Right Foot ordered r/o fractures   - neuro checks q4h  - Bed alarm ordered, reinforced with RN  - fall precautions  - bedrest for now    - education provided to patient about utilizing call bell for assistance, patient verbalized understanding   - PT re-eval ordered   - case discussed with RN, Dr. Vera, NM informed by RN      Nish Dunn, [facility] Oncology Hospitalist Team  Pg [phone]                 HPI:       1) _____fall  - CT head ordered to r/o acute bleed. Preliminary findings   - Bed alarm  - fall precautions  - enhanced supervision   - case discussed with   - Will discuss case with primary team in AM    Laisha Li PA-C  Department of Medicine Post Fall Assessment    Code fall called by RN ~1840, s/p unwitnessed fall, patient noted near bed on floor, Pt seen and examined at bedside. Patient reports she herself got up to go to bathroom and while coming back to bed "I tripped on something and fell"   Pt states did/did not hit head, reports no LOC, Patient denies CP, palpitations, headaches, dizziness, hip / back pain, abdominal pain.   Patient reports pain to Right ankle/foot, denies numbness/tingling. VSS, afebrile, placed back on 2L NC    Recent Vital Signs:   Vital Signs Last 24 Hrs  T(C): 36.2 (27 Jan 2025 16:57), Max: 37 (27 Jan 2025 05:30)  T(F): 97.2 (27 Jan 2025 16:57), Max: 98.6 (27 Jan 2025 05:30)  HR: 96 (27 Jan 2025 16:57) (72 - 97)  BP: 138/60 (27 Jan 2025 16:57) (137/58 - 147/72)  BP(mean): --  RR: 19 (27 Jan 2025 16:57) (17 - 19)  SpO2: 100% (27 Jan 2025 16:57) (95% - 100%)    Parameters below as of 27 Jan 2025 16:57  Patient On (Oxygen Delivery Method): nasal cannula  O2 Flow (L/min): 2    Physical Exam:  General: NAD, cachectic   Mental Status: A&O x3  Skin: Warm, dry, right dorsal foot with diffused erythema, otherwise no rashes/lesions/bruises  Head: NCAT  Neuro: grossly non focal  Cardiac: S1/S2, no murmur/gallop, peripheral pulses 2+ bilaterally  Lungs: CTA b/l  Abdomen: Soft, non distended, non tender, normoactive BS  Extremities: No clubbing or cyanosis of digits, +1 foot edema b/l, Full ROM all 4 extremities.  +erythema noted on dorsal right foot/mid ankle area, 2+ pedal pulses bilaterally. Full sensation and ROM in all extremities,    1) Unwitnessed fall  - CT head ordered   - Xray Right Foot ordered r/o fractures   - neuro checks q4h  - Bed alarm ordered, reinforced with RN  - fall precautions  - bedrest for now    - education provided to patient about utilizing call bell for assistance, patient verbalized understanding   - PT re-eval ordered   - case discussed with RN, Dr. Vera, NM informed by RN      Nish Dunn, [facility] Oncology Hospitalist Team  Pg [phone]

## 2025-01-27 NOTE — CONSULT NOTE ADULT - PROBLEM SELECTOR RECOMMENDATION 9
- Patient with lung neuroendocrine tumor with pulmonary, bone and brain metastases, s/p WBRT in 11/2024, most recently on everolimus.   - CT 1/26 - Findings suggestive of bilateral pulmonary metastases, hepatic   metastases, right adrenal gland metastasis as well as metastatic implants   within the subcutaneous tissues of the anterior chest wall/both breasts.  Mediastinal/bilateral hilar adenopathy.  - Follows with Francisco J  - Outpatient oncology notes reviewed   - Per [facility] discussion, she needs to discuss her cancer treatment options with Dr. Marx and would need to have clarification and explanation about any proposed treatment options before she can decide if she is interested in further DMT. 60.4

## 2025-01-27 NOTE — DIETITIAN INITIAL EVALUATION ADULT - ORAL NUTRITION SUPPLEMENTS
Recommend Ensure Max (provides 150kcal, 30gms protein per serving) BID to support nutrition status as acceptable per SLP

## 2025-01-27 NOTE — CONSULT NOTE ADULT - SUBJECTIVE AND OBJECTIVE BOX
Date of Service 25 @ 13:48     Reason for Consultation:	[] Pain		[] Goals of Care		[] Non-pain symptoms    [] End of life discussion		[] Other:    HPI:  64 year old female with a history of lung neuroendocrine tumor with pulmonary, bone and brain metastases, s/p WBRT in 2024, most recently on everolimus, papillary thyroid carcinoma s/p partial thyroidectomy, HTN, HLD presenting with worsening SOB and chest pain. Patient's daughter assisted with history. Patient recently returned from a 2 month trip to the [facility]- returned 3 days ago. During the past 2 months she has had worsening fatigue and low appetite, as well as worsening balance/gait. She has had worsening pain especially in her L rib area and back. Oxycodone helps with the pain when she takes it- usually takes it about every 4-5 hours during the day as needed. Overnight she usually tries to go without taking the oxycodone but wakes up in pain. Denies constipation- previously used to have diarrhea but now has normal BMs while on narcotics. Pt previously was on dexamethasone 4mg BID at the start of her trip, but then noticed worsening swelling in her legs and developed moon facies and spoke with her oncology team who recommended decreasing the dose to 4mg qd. Patient denies fevers but does often feel cold; denies nausea or abdominal pain, no URI symptoms. (2025 18:41)      Interval History:       PERTINENT PM/SXH:   Hypertension    Lung nodules    Hyperlipidemia    Former smoker    Thyroid cancer    Diabetes mellitus    Lung cancer, primary, with metastasis from lung to other site    Neuroendocrine neoplasm of lung    Cancer with pulmonary metastases      H/O  section    S/P left oophorectomy    H/O: hysterectomy    H/O fracture of leg    History of thyroid surgery    History of lung surgery      FAMILY HISTORY:  FH: CAD (coronary artery disease) (Father)        Family Hx substance abuse [ ]yes [x ]no    ITEMS NOT CHECKED ARE NOT PRESENT    SOCIAL HISTORY:   Significant other/partner[ ]  Children[ ]  Druze/Spirituality:  Substance hx:  [ ]   Tobacco hx:  [ ]   Alcohol hx: [ ]   Home Opioid hx:  [ ] I-Stop Reference No: [id number]  PDI Filter:    PDI	Current Rx	Drug Type	Rx Written	Rx Dispensed	Drug	Quantity	Days Supply	Prescriber Name	Prescriber STEVEN #	Payment Method	Dispenser  A	Y	O	2025	oxycontin er 20 mg tablet	60	30	Seetharamu, Nagashree MD	[id number]	Medicaid	[facility]	N	O	2024	oxycontin er 20 mg tablet	60	30	Bong Marx MD	[id number]	Medicaid	[facility]	N	O	2024	oxycontin er 30 mg tablet	60	30	Bong Marx MD	[id number]	Medicaid	[facility]	N	B	2024	alprazolam 0.5 mg tablet	90	30	Bong Marx MD	[id number]	Medicaid	[facility]	N	O	2024	oxycodone hcl (ir) 10 mg tab	264	22	Bong Marx MD	[id number]	Medicaid	[facility]	N	O	11/15/2024	11/15/2024	morphine sulf er 15 mg tablet	60	30	Yari oCx	[id number]	Medicaid	[facility]	N	O	10/30/2024	10/30/2024	oxycodone hcl (ir) 10 mg tab	264	22	Yari Cox	[id number]	Medicaid	[facility]	N	O	2024	10/01/2024	oxycodone hcl (ir) 5 mg tablet	180	30	oBng Marx MD	[id number]	Medicaid	[facility] [id number]  B	N	O	08/15/2024	2024	oxycodone hcl (ir) 5 mg tablet	180	30	Meghna Kapoor	[id number]	Medicaid	[facility] [id number]  B	N	O	2024	oxycodone hcl (ir) 5 mg tablet	180	30	Yari Cox	[id number]	Medicaid	[facility] [id number]  B	N	O	2024	oxycodone hcl (ir) 5 mg tablet	90	15	Meghna Kapoor	[id number]	Medicaid	[facility] [id number]  B	N	O	2024	oxycodone hcl (ir) 5 mg tablet	90	15	Meghna Kapoor	[id number]	Medicaid	[facility] [id number]  Living Situation: [ ]Home  [ ]Long term care  [ ]Rehab [ ]Other  Home Services: [ ] HHA [ ] Visting RN [ ] Hospice      ADVANCE DIRECTIVES:    MOLST  [ ]  Living Will  [ ]   DECISION MAKER(s):  [ ] Health Care Proxy(s)  [ ] Surrogate(s)  [ ] Guardian           Name(s): Phone Number(s):    BASELINE (I)ADL(s) (prior to admission):  [place]: [ ]Total  [ ] Moderate [ ]Dependent    Allergies    No Known Allergies    Intolerances    MEDICATIONS  (STANDING):  atorvastatin 10 milliGRAM(s) Oral at bedtime  carvedilol 25 milliGRAM(s) Oral every 12 hours  chlorhexidine 2% Cloths 1 Application(s) Topical daily  dexAMETHasone     Tablet 4 milliGRAM(s) Oral daily  dextrose 5%. 1000 milliLiter(s) (50 mL/Hr) IV Continuous <Continuous>  dextrose 5%. 1000 milliLiter(s) (100 mL/Hr) IV Continuous <Continuous>  dextrose 50% Injectable 25 Gram(s) IV Push once  dextrose 50% Injectable 12.5 Gram(s) IV Push once  dextrose 50% Injectable 25 Gram(s) IV Push once  enoxaparin Injectable 30 milliGRAM(s) SubCutaneous every 24 hours  glucagon  Injectable 1 milliGRAM(s) IntraMuscular once  influenza  Vaccine (HIGH DOSE) 0.5 milliLiter(s) IntraMuscular once  insulin lispro (ADMELOG) corrective regimen sliding scale   SubCutaneous at bedtime  insulin lispro (ADMELOG) corrective regimen sliding scale   SubCutaneous three times a day before meals  losartan 50 milliGRAM(s) Oral daily  mirtazapine 7.5 milliGRAM(s) Oral at bedtime  montelukast 10 milliGRAM(s) Oral daily  multivitamin 1 Tablet(s) Oral daily  nystatin    Suspension 950604 Unit(s) Oral every 6 hours  oxyCODONE  ER Tablet 20 milliGRAM(s) Oral <User Schedule>  pantoprazole    Tablet 40 milliGRAM(s) Oral before breakfast  polyethylene glycol 3350 17 Gram(s) Oral two times a day  senna 2 Tablet(s) Oral at bedtime  trimethoprim  160 mG/sulfamethoxazole 800 mG 1 Tablet(s) Oral <User Schedule>    MEDICATIONS  (PRN):  acetaminophen     Tablet .. 650 milliGRAM(s) Oral every 6 hours PRN Temp greater or equal to 38C (100.4F), Mild Pain (1 - 3)  bisacodyl 5 milliGRAM(s) Oral every 12 hours PRN Constipation  dextrose Oral Gel 15 Gram(s) Oral once PRN Blood Glucose LESS THAN 70 milliGRAM(s)/deciliter  melatonin 3 milliGRAM(s) Oral at bedtime PRN Insomnia  metoclopramide Injectable 10 milliGRAM(s) IV Push every 8 hours PRN Nausea/vomiting  naloxone Injectable 0.1 milliGRAM(s) IV Push every 3 minutes PRN For ANY of the following changes in patient status:  A. RR LESS THAN 10 breaths per minute, B. Oxygen saturation LESS THAN 90%, C. Sedation score of 6  oxyCODONE    IR 10 milliGRAM(s) Oral every 3 hours PRN Moderate-severe pain        ITEMS UNCHECKED ARE NOT PRESENT     PRESENT SYMPTOMS: [ ]Unable to self-report due to altered mental status  [ ] CPOT [ ] PAINADs [ ] RDOS  Source if other than patient:  [ ]Family   [ ]Team     Pain: [ ]yes [ ]no  QOL impact -   Location -                    Aggravating factors -  Quality -  Radiation -  Timing-  Severity (0-10 scale):  Minimal acceptable level / Pain goal (0-10 scale):     CPOT:    https://www.[facility].org/getattachment/[email or web address]/Critical-Care-Pain-Observation-Tool-(CPOT)    Dyspnea:                           [ ]Mild [ ]Moderate [ ]Severe  Anxiety:                             [ ]Mild [ ]Moderate [ ]Severe  Agitation:                          [ ]Mild [ ]Moderate [ ]Severe  Fatigue:                             [ ]Mild [ ]Moderate [ ]Severe  Nausea:                             [ ]Mild [ ]Moderate [ ]Severe  Loss of appetite:              [ ]Mild [ ]Moderate [ ]Severe  Constipation:                   [ ]Mild [ ]Moderate [ ]Severe  Diarrhea:                          [ ]Mild [ ]Moderate [ ]Severe  Pruritus:                            [ ]Mild [ ]Moderate [ ]Severe  Depression:                      [ ]Mild [ ]Moderate [ ]Severe    PCSSQ[Palliative Care Spiritual Screening Question]   Severity (0-10):  Score of 4 or > indicate consideration of Chaplaincy referral.  Chaplaincy Referral: [ ] yes [ ] refused [ ] following [ x] deferred    Caregiver [place]? : [ ] yes [ ] no [ ] Declined   [x ] Deferred            Social work referral [ ] Patient & Family Centered Care Referral [ ]     Anticipatory Grief present?:  [ ] yes [ ] no  [x ] Deferred                  Social work referral [ ] Chaplaincy Referral[ ]    Other Symptoms:  [ ]All other review of systems negative   [ ] Unable to obtain due to poor mentation     PHYSICAL EXAM:  Vital Signs Last 24 Hrs  T(C): 36.7 (2025 11:59), Max: 37 (2025 05:30)  T(F): 98.1 (2025 11:59), Max: 98.6 (2025 05:30)  HR: 72 (2025 11:59) (70 - 97)  BP: 137/58 (2025 11:59) (135/67 - 150/70)  BP(mean): --  RR: 18 (2025 11:59) (13 - 20)  SpO2: 96% (2025 11:59) (95% - 97%)    Parameters below as of 2025 11:59  Patient On (Oxygen Delivery Method): nasal cannula  O2 Flow (L/min): 2       I&O's Summary      GENERAL:  [ ]Alert  [ ]Oriented x   [ ]Lethargic  [ ]Cachexia  [ ]Unarousable  [ ]Verbal  [ ]Non-Verbal  [ ] No Distress  Behavioral:   [ ] Anxiety  [ ] Delirium [ ] Agitation [ ] Calm  [ ] Other  HEENT:  [ ]Normal  [ ] Temporal Wasting  [ ]Dry mouth   [ ]ET Tube/Trach  [ ]Oral lesions  [ ] Mucositis  PULMONARY:   [ ]Clear [ ]Tachypnea  [ ]Audible excessive secretions   [ ]Rhonchi        [ ]Right [ ]Left [ ]Bilateral  [ ]Crackles        [ ]Right [ ]Left [ ]Bilateral  [ ]Wheezing     [ ]Right [ ]Left [ ]Bilateral  [ ]Diminished breath sounds [ ]right [ ]left [ ]bilateral  CARDIOVASCULAR:    [ ]Regular [ ]Irregular [ ]Tachy  [ ]Anam [ ]Murmur [ ]Other  GASTROINTESTINAL:  [ ]Soft  [ ]Distended   [ ]+BS  [ ]Non tender [ ]Tender  [ ]PEG [ ]OGT/ NGT  Last BM:   GENITOURINARY:  [ ]Normal [ ] Incontinent   [ ]Oliguria/Anuria   [ ]Fung  MUSCULOSKELETAL:   [ ]Normal   [ ]Weakness  [ ]Bed/Wheelchair bound [ ]Edema  [  ] amputation  [  ] contraction  NEUROLOGIC:   [ ]No focal deficits  [ ]Cognitive impairment  [ ]Dysphagia [ ]Dysarthria [ ]Paresis [ ]Other   SKIN: See Nursing Skin Assessment for further details  [ ]Normal    [ ]Rash  [ ]Pressure ulcer(s)       Present on admission [ ]y [ ]n   [  ]  Wound    [  ] hyperpigmentation    CRITICAL CARE:  [ ] Shock Present  [ ]Septic [ ]Cardiogenic [ ]Neurologic [ ]Hypovolemic  [ ]  Vasopressors [ ]  Inotropes   [ ]Respiratory failure present [ ]Mechanical ventilation [ ]Non-invasive ventilatory support [ ]High flow    [ ]Acute  [ ]Chronic [ ]Hypoxic  [ ]Hypercarbic [ ]Other  [ ]Other organ failure     LABS:  reviewed                         10.8   9.73  )-----------( 181      ( 2025 05:44 )             31.9       144  |  102  |  6[L]  ----------------------------<  135[H]  3.4[L]   |  29  |  0.32[L]    Ca    8.3[L]      2025 05:44  Phos  2.5       Mg     2.10         TPro  5.0[L]  /  Alb  3.0[L]  /  TBili  0.7  /  DBili  x   /  AST  28  /  ALT  19  /  AlkPhos  74    PT/INR - ( 2025 10:48 )   PT: 11.9 sec;   INR: 1.00 ratio         PTT - ( 2025 10:48 )  PTT:24.0 sec  Urinalysis Basic - ( 2025 05:44 )    Color: x / Appearance: x / SG: x / pH: x  Gluc: 135 mg/dL / Ketone: x  / Bili: x / Urobili: x   Blood: x / Protein: x / Nitrite: x   Leuk Esterase: x / RBC: x / WBC x   Sq Epi: x / Non Sq Epi: x / Bacteria: x      CAPILLARY BLOOD GLUCOSE      POCT Blood Glucose.: 264 mg/dL (2025 12:20)  POCT Blood Glucose.: 263 mg/dL (2025 08:28)  POCT Blood Glucose.: 208 mg/dL (2025 23:13)  POCT Blood Glucose.: 369 mg/dL (2025 19:25)  POCT Blood Glucose.: 312 mg/dL (2025 17:52)      RADIOLOGY & ADDITIONAL STUDIES: reviewed     PROTEIN CALORIE MALNUTRITION PRESENT: [ ]mild [ ]moderate [ ]severe [ ]underweight [ ]morbid obesity  https://www.andeal.org/vault/2440/web/files/ONC/Table_Clinical%20Characteristics%20to%20Document%20Malnutrition-White%20JV%20et%20al%309995.pdf    Height (cm): 147.32 (24 @ 16:00), 144.8 (10-19-24 @ 14:12), 144.8 (03-10-24 @ 23:45)  Weight (kg): 40.8 (25 @ 09:21), 45.1 (24 @ 16:00), 45.4 (10-19-24 @ 14:12)  BMI (kg/m2): 18.8 (25 @ 09:21), 20.8 (24 @ 16:00), 20.9 (24 @ 16:00)    [ ]PPSV2 < or = to 30% [ ]significant weight loss  [ ]poor nutritional intake  [ ]anasarca [ ]Artificial Nutrition      REFERRALS:   [ ]Chaplaincy  [ ]Hospice  [ ]Child Life  [ ]Social Work  [ ]Case management [ ]Holistic Therapy      Date of Service 25 @ 13:48    HPI:  64 year old female with a history of lung neuroendocrine tumor with pulmonary, bone and brain metastases, s/p WBRT in 2024, most recently on everolimus, papillary thyroid carcinoma s/p partial thyroidectomy, HTN, HLD presenting with worsening SOB and chest pain. Patient's daughter assisted with history. Patient recently returned from a 2 month trip to the [facility]- returned 3 days ago. During the past 2 months she has had worsening fatigue and low appetite, as well as worsening balance/gait. She has had worsening pain especially in her L rib area and back. Oxycodone helps with the pain when she takes it- usually takes it about every 4-5 hours during the day as needed. Overnight she usually tries to go without taking the oxycodone but wakes up in pain. Denies constipation- previously used to have diarrhea but now has normal BMs while on narcotics. Pt previously was on dexamethasone 4mg BID at the start of her trip, but then noticed worsening swelling in her legs and developed moon facies and spoke with her oncology team who recommended decreasing the dose to 4mg qd. Patient denies fevers but does often feel cold; denies nausea or abdominal pain, no URI symptoms. (2025 18:41)    PERTINENT PM/SXH:   Hypertension  Lung nodules  Hyperlipidemia  Former smoker  Thyroid cancer  Diabetes mellitus  Lung cancer, primary, with metastasis from lung to other site  Neuroendocrine neoplasm of lung  Cancer with pulmonary metastases  H/O  section  S/P left oophorectomy  H/O: hysterectomy  H/O fracture of leg  History of thyroid surgery  History of lung surgery    FAMILY HISTORY:  FH: CAD (coronary artery disease) (Father)    ITEMS NOT CHECKED ARE NOT PRESENT    SOCIAL HISTORY:   Significant other/partner[ x]  Children[x ]  Gnosticism/Spirituality:  Substance hx:  [ ]   Tobacco hx:  [ ]   Alcohol hx: [ ]   Home Opioid hx:  [x ] I-Stop Reference No: [id number]  PDI Filter:    PDI	Current Rx	Drug Type	Rx Written	Rx Dispensed	Drug	Quantity	Days Supply	Prescriber Name	Prescriber STEVEN #	Payment Method	Dispenser  A	Y	O	2025	oxycontin er 20 mg tablet	60	30	Bong Marx MD	[id number]	Medicaid	[facility]  A	N	O	2024	oxycontin er 20 mg tablet	60	30	Bong Marx MD	[id number]	Medicaid	[facility]	N	O	2024	oxycontin er 30 mg tablet	60	30	Bong Marx MD	[id number]	Medicaid	[facility]	N	B	2024	alprazolam 0.5 mg tablet	90	30	Bong Marx MD	[id number]	Medicaid	[facility]	N	O	2024	oxycodone hcl (ir) 10 mg tab	264	22	Bong Marx MD	[id number]	Medicaid	[facility]	N	O	11/15/2024	11/15/2024	morphine sulf er 15 mg tablet	60	30	Yari Cox	[id number]	Medicaid	[facility]	N	O	10/30/2024	10/30/2024	oxycodone hcl (ir) 10 mg tab	264	22	Yari Cox	[id number]	Medicaid	[facility]	N	O	2024	10/01/2024	oxycodone hcl (ir) 5 mg tablet	180	30	Bong Marx MD	[id number]	Medicaid	[facility] [id number]  	N	O	08/15/2024	2024	oxycodone hcl (ir) 5 mg tablet	180	30	Meghna Kapoor	[id number]	Medicaid	[facility] [id number]  	N	O	2024	oxycodone hcl (ir) 5 mg tablet	180	30	Yari Cox	[id number]	Medicaid	[facility] [id number]  B	N	O	2024	oxycodone hcl (ir) 5 mg tablet	90	15	Meghna Kapoor	[id number]	Medicaid	[facility] [id number]  B	N	O	2024	oxycodone hcl (ir) 5 mg tablet	90	15	Meghna Kapoor	[id number]	Medicaid	[facility] [id number]  Living Situation: [x ]Home  [ ]Long term care  [ ]Rehab [ ]Other      ADVANCE DIRECTIVES:    MOLST  [ ]  Living Will  [ ]   DECISION MAKER(s):  [x ] Health Care Proxy(s)  [ ] Surrogate(s)  [ ] Guardian           Name(s): Phone Number(s):  Primary HCP:  Hebert Slater ([phone])   Alternate HCP : Daughter Rachel Meehan ([phone])     BASELINE (I)ADL(s) (prior to admission):  [place]: [x ]Total  [ ] Moderate [ ]Dependent    Allergies    No Known Allergies    Intolerances    MEDICATIONS  (STANDING):  atorvastatin 10 milliGRAM(s) Oral at bedtime  carvedilol 25 milliGRAM(s) Oral every 12 hours  chlorhexidine 2% Cloths 1 Application(s) Topical daily  dexAMETHasone     Tablet 4 milliGRAM(s) Oral daily  dextrose 5%. 1000 milliLiter(s) (50 mL/Hr) IV Continuous <Continuous>  dextrose 5%. 1000 milliLiter(s) (100 mL/Hr) IV Continuous <Continuous>  dextrose 50% Injectable 25 Gram(s) IV Push once  dextrose 50% Injectable 12.5 Gram(s) IV Push once  dextrose 50% Injectable 25 Gram(s) IV Push once  enoxaparin Injectable 30 milliGRAM(s) SubCutaneous every 24 hours  glucagon  Injectable 1 milliGRAM(s) IntraMuscular once  influenza  Vaccine (HIGH DOSE) 0.5 milliLiter(s) IntraMuscular once  insulin lispro (ADMELOG) corrective regimen sliding scale   SubCutaneous at bedtime  insulin lispro (ADMELOG) corrective regimen sliding scale   SubCutaneous three times a day before meals  losartan 50 milliGRAM(s) Oral daily  mirtazapine 7.5 milliGRAM(s) Oral at bedtime  montelukast 10 milliGRAM(s) Oral daily  multivitamin 1 Tablet(s) Oral daily  nystatin    Suspension 396988 Unit(s) Oral every 6 hours  oxyCODONE  ER Tablet 20 milliGRAM(s) Oral <User Schedule>  pantoprazole    Tablet 40 milliGRAM(s) Oral before breakfast  polyethylene glycol 3350 17 Gram(s) Oral two times a day  senna 2 Tablet(s) Oral at bedtime  trimethoprim  160 mG/sulfamethoxazole 800 mG 1 Tablet(s) Oral <User Schedule>    MEDICATIONS  (PRN):  acetaminophen     Tablet .. 650 milliGRAM(s) Oral every 6 hours PRN Temp greater or equal to 38C (100.4F), Mild Pain (1 - 3)  bisacodyl 5 milliGRAM(s) Oral every 12 hours PRN Constipation  dextrose Oral Gel 15 Gram(s) Oral once PRN Blood Glucose LESS THAN 70 milliGRAM(s)/deciliter  melatonin 3 milliGRAM(s) Oral at bedtime PRN Insomnia  metoclopramide Injectable 10 milliGRAM(s) IV Push every 8 hours PRN Nausea/vomiting  naloxone Injectable 0.1 milliGRAM(s) IV Push every 3 minutes PRN For ANY of the following changes in patient status:  A. RR LESS THAN 10 breaths per minute, B. Oxygen saturation LESS THAN 90%, C. Sedation score of 6  oxyCODONE    IR 10 milliGRAM(s) Oral every 3 hours PRN Moderate-severe pain        ITEMS UNCHECKED ARE NOT PRESENT     PRESENT SYMPTOMS: [ ]Unable to self-report due to altered mental status  [ ] CPOT [ ] PAINADs [ ] RDOS  Source if other than patient:  [ ]Family   [ ]Team     Pain: [x ]yes [ ]no  QOL impact - moderate  Location -   lower back                  Aggravating factors - movement   Quality - sharp  Radiation - none   Timing- intermittent   Severity (0-10 scale): 3  Minimal acceptable level / Pain goal (0-10 scale): 3    CPOT:    https://www.[facility].org/getattachment/[email or web address]/Critical-Care-Pain-Observation-Tool-(CPOT)    Dyspnea:                           [ ]Mild [ ]Moderate [ ]Severe  Anxiety:                             [ ]Mild [ ]Moderate [ ]Severe  Agitation:                          [ ]Mild [ ]Moderate [ ]Severe  Fatigue:                             [ ]Mild [ ]Moderate [ ]Severe  Nausea:                             [ ]Mild [ ]Moderate [ ]Severe  Loss of appetite:              [ ]Mild [x ]Moderate [ ]Severe  Constipation:                   [ ]Mild [ ]Moderate [ ]Severe  Diarrhea:                          [ ]Mild [ ]Moderate [ ]Severe      PCSSQ[Palliative Care Spiritual Screening Question]   Severity (0-10):  Score of 4 or > indicate consideration of Chaplaincy referral.  Chaplaincy Referral: [ ] yes [ ] refused [ ] following [ x] deferred    Caregiver [place]? : [ ] yes [ ] no [ ] Declined   [x ] Deferred            Social work referral [ ] Patient & Family Centered Care Referral [ ]     Anticipatory Grief present?:  [ ] yes [ ] no  [x ] Deferred                  Social work referral [ ] Chaplaincy Referral[ ]    Other Symptoms:  [x ]All other review of systems negative   [ ] Unable to obtain due to poor mentation     PHYSICAL EXAM:  Vital Signs Last 24 Hrs  T(C): 36.7 (2025 11:59), Max: 37 (2025 05:30)  T(F): 98.1 (2025 11:59), Max: 98.6 (2025 05:30)  HR: 72 (2025 11:59) (70 - 97)  BP: 137/58 (2025 11:59) (135/67 - 150/70)  BP(mean): --  RR: 18 (2025 11:59) (13 - 20)  SpO2: 96% (2025 11:59) (95% - 97%)    Parameters below as of 2025 11:59  Patient On (Oxygen Delivery Method): nasal cannula  O2 Flow (L/min): 2       I&O's Summary      GENERAL:  [x ]Alert  [x ]Oriented x 3  [ ]Lethargic  [ ]Cachexia  [ ]Unarousable  [x ]Verbal  [ ]Non-Verbal  [x ] No Distress  Behavioral:   [ ] Anxiety  [ ] Delirium [ ] Agitation [x ] Calm  [ ] Other  HEENT:  [x ]Normal  [ ] Temporal Wasting  [ ]Dry mouth   [ ]ET Tube/Trach  [ ]Oral lesions  [ ] Mucositis  PULMONARY:   [x ]Clear [ ]Tachypnea  [ ]Audible excessive secretions   [ ]Rhonchi        [ ]Right [ ]Left [ ]Bilateral  [ ]Crackles        [ ]Right [ ]Left [ ]Bilateral  [ ]Wheezing     [ ]Right [ ]Left [ ]Bilateral  [ ]Diminished breath sounds [ ]right [ ]left [ ]bilateral  CARDIOVASCULAR:    [x ]Regular [ ]Irregular [ ]Tachy  [ ]Anam [ ]Murmur [ ]Other  GASTROINTESTINAL:  [x ]Soft  [ ]Distended   [ ]+BS  [x ]Non tender [ ]Tender  [ ]PEG [ ]OGT/ NGT  Last BM:   GENITOURINARY:  [x ]Normal [ ] Incontinent   [ ]Oliguria/Anuria   [ ]Fung  MUSCULOSKELETAL:   [ x]Normal   [ ]Weakness  [ ]Bed/Wheelchair bound [ ]Edema  [  ] amputation  [  ] contraction  NEUROLOGIC:   [x ]No focal deficits  [ ]Cognitive impairment  [ ]Dysphagia [ ]Dysarthria [ ]Paresis [ ]Other   SKIN: See Nursing Skin Assessment for further details  [ x]Normal    [ ]Rash  [ ]Pressure ulcer(s)       Present on admission [ ]y [ ]n   [  ]  Wound    [  ] hyperpigmentation    CRITICAL CARE:  [ ] Shock Present  [ ]Septic [ ]Cardiogenic [ ]Neurologic [ ]Hypovolemic  [ ]  Vasopressors [ ]  Inotropes   [ ]Respiratory failure present [ ]Mechanical ventilation [ ]Non-invasive ventilatory support [ ]High flow    [ ]Acute  [ ]Chronic [ ]Hypoxic  [ ]Hypercarbic [ ]Other  [ ]Other organ failure     LABS:  reviewed                         10.8   9.73  )-----------( 181      ( 2025 05:44 )             31.9       144  |  102  |  6[L]  ----------------------------<  135[H]  3.4[L]   |  29  |  0.32[L]    Ca    8.3[L]      2025 05:44  Phos  2.5       Mg     2.10         TPro  5.0[L]  /  Alb  3.0[L]  /  TBili  0.7  /  DBili  x   /  AST  28  /  ALT  19  /  AlkPhos  74    PT/INR - ( 2025 10:48 )   PT: 11.9 sec;   INR: 1.00 ratio         PTT - ( 2025 10:48 )  PTT:24.0 sec  Urinalysis Basic - ( 2025 05:44 )    Color: x / Appearance: x / SG: x / pH: x  Gluc: 135 mg/dL / Ketone: x  / Bili: x / Urobili: x   Blood: x / Protein: x / Nitrite: x   Leuk Esterase: x / RBC: x / WBC x   Sq Epi: x / Non Sq Epi: x / Bacteria: x      CAPILLARY BLOOD GLUCOSE      POCT Blood Glucose.: 264 mg/dL (2025 12:20)  POCT Blood Glucose.: 263 mg/dL (2025 08:28)  POCT Blood Glucose.: 208 mg/dL (2025 23:13)  POCT Blood Glucose.: 369 mg/dL (2025 19:25)  POCT Blood Glucose.: 312 mg/dL (2025 17:52)      RADIOLOGY & ADDITIONAL STUDIES: reviewed     PROTEIN CALORIE MALNUTRITION PRESENT: [ ]mild [ ]moderate [ x]severe [ ]underweight [ ]morbid obesity  https://www.andeal.org/vault/1620/web/files/ONC/Table_Clinical%20Characteristics%20to%20Document%20Malnutrition-White%20JV%20et%20al%2020.pdf    Height (cm): 147.32 (24 @ 16:00), 144.8 (10-19-24 @ 14:12), 144.8 (03-10-24 @ 23:45)  Weight (kg): 40.8 (25 @ 09:21), 45.1 (24 @ 16:00), 45.4 (10-19-24 @ 14:12)  BMI (kg/m2): 18.8 (25 @ 09:21), 20.8 (24 @ 16:00), 20.9 (24 @ 16:00)    [ ]PPSV2 < or = to 30% [ ]significant weight loss  [ ]poor nutritional intake  [ ]anasarca [ ]Artificial Nutrition      REFERRALS:   [ ]Chaplaincy  [ ]Hospice  [ ]Child Life  [ ]Social Work  [x ]Case management [ ]Holistic Therapy

## 2025-01-28 NOTE — PROGRESS NOTE ADULT - PROBLEM SELECTOR PLAN 1
- Patient with lung neuroendocrine tumor with pulmonary, bone and brain metastases, s/p WBRT in 11/2024, most recently on everolimus.   - CT 1/26 - Findings suggestive of bilateral pulmonary metastases, hepatic   metastases, right adrenal gland metastasis as well as metastatic implants   within the subcutaneous tissues of the anterior chest wall/both breasts.  Mediastinal/bilateral hilar adenopathy.  - Follows with Francisco J  - Patient shares she is interested in pursuing DMT- plan for inpatient DMT this admission

## 2025-01-28 NOTE — PROGRESS NOTE ADULT - SUBJECTIVE AND OBJECTIVE BOX
Date of Service  : 1/28/2025  Indication for Geriatrics and Palliative Care Services: goals of care     SUBJECTIVE AND OBJECTIVE:  Patient seen and examined with  at bedside. Patient states she feels a bit more dyspneic today compared to yesterday. Also having discomfort in her chest and wanted prn dose of oxycodone during encounter ; asked bedside RN to provide prn dose of oxycodone    INTERVAL HPI/OVERNIGHT EVENTS:  In past 24 hours, received 0 prn doses of po oxycodone   s/p fall overnight.     Allergies    No Known Allergies    Intolerances    MEDICATIONS  (STANDING):  albuterol/ipratropium for Nebulization 3 milliLiter(s) Nebulizer every 6 hours  atorvastatin 10 milliGRAM(s) Oral at bedtime  carvedilol 25 milliGRAM(s) Oral every 12 hours  chlorhexidine 2% Cloths 1 Application(s) Topical daily  dexAMETHasone     Tablet 4 milliGRAM(s) Oral daily  dextrose 5%. 1000 milliLiter(s) (50 mL/Hr) IV Continuous <Continuous>  dextrose 5%. 1000 milliLiter(s) (100 mL/Hr) IV Continuous <Continuous>  dextrose 50% Injectable 25 Gram(s) IV Push once  dextrose 50% Injectable 12.5 Gram(s) IV Push once  dextrose 50% Injectable 25 Gram(s) IV Push once  enoxaparin Injectable 30 milliGRAM(s) SubCutaneous every 24 hours  glucagon  Injectable 1 milliGRAM(s) IntraMuscular once  influenza  Vaccine (HIGH DOSE) 0.5 milliLiter(s) IntraMuscular once  insulin lispro (ADMELOG) corrective regimen sliding scale   SubCutaneous three times a day before meals  insulin lispro (ADMELOG) corrective regimen sliding scale   SubCutaneous at bedtime  losartan 50 milliGRAM(s) Oral daily  mirtazapine 7.5 milliGRAM(s) Oral at bedtime  montelukast 10 milliGRAM(s) Oral daily  multivitamin 1 Tablet(s) Oral daily  nystatin    Suspension 330415 Unit(s) Oral every 6 hours  oxyCODONE  ER Tablet 20 milliGRAM(s) Oral <User Schedule>  pantoprazole    Tablet 40 milliGRAM(s) Oral before breakfast  polyethylene glycol 3350 17 Gram(s) Oral two times a day  senna 2 Tablet(s) Oral at bedtime  trimethoprim  160 mG/sulfamethoxazole 800 mG 1 Tablet(s) Oral <User Schedule>    MEDICATIONS  (PRN):  bisacodyl 5 milliGRAM(s) Oral every 12 hours PRN Constipation  dextrose Oral Gel 15 Gram(s) Oral once PRN Blood Glucose LESS THAN 70 milliGRAM(s)/deciliter  melatonin 3 milliGRAM(s) Oral at bedtime PRN Insomnia  metoclopramide Injectable 10 milliGRAM(s) IV Push every 8 hours PRN Nausea/vomiting  naloxone Injectable 0.1 milliGRAM(s) IV Push every 3 minutes PRN For ANY of the following changes in patient status:  A. RR LESS THAN 10 breaths per minute, B. Oxygen saturation LESS THAN 90%, C. Sedation score of 6  oxyCODONE    IR 10 milliGRAM(s) Oral every 3 hours PRN Moderate-severe pain      ITEMS UNCHECKED ARE NOT PRESENT    PRESENT SYMPTOMS: [ ]Unable to self-report due to altered mental status- see [ ] CPOT [ ] PAINADS [ ] RDOS  Source if other than patient:  [ ]Family   [ ]Team     Pain: [x ]yes [ ]no  QOL impact - moderate  Location -   lower back                  Aggravating factors - movement   Quality - sharp  Radiation - none   Timing- intermittent   Severity (0-10 scale): 3  Minimal acceptable level / Pain goal (0-10 scale): 3    Dyspnea:                           [ ]Mild [ x]Moderate [ ]Severe  Anxiety:                             [ ]Mild [ ]Moderate [ ]Severe  Agitation:                          [ ]Mild [ ]Moderate [ ]Severe  Fatigue:                             [ ]Mild [ ]Moderate [ ]Severe  Nausea:                             [ ]Mild [ ]Moderate [ ]Severe  Loss of appetite:              [ ]Mild [ x]Moderate [ ]Severe  Constipation:                   [ ]Mild [ ]Moderate [ ]Severe  Diarrhea:                          [ ]Mild [ ]Moderate [ ]Severe      CPOT:    https://www.[email or web address]/getattachment/[email or web address]/Critical-Care-Pain-Observation-Tool-(CPOT)    PCSSQ[Palliative Care Spiritual Screening Question]   Severity (0-10):  Score of 4 or > indicate consideration of Chaplaincy referral.  Chaplaincy Referral: [ ] yes [ ] refused [ ] following [x ] deferred    Caregiver Idalia? : [ ] yes [ ] no [ ] Declined [x ] Deferred              Social work referral [ ] Patient & Family Centered Care Referral [ ]     Anticipatory Grief present?:  [ ] yes [ ] no  [ x] Deferred                  Social work referral [ ] Chaplaincy Referral[ ]    Other Symptoms:  [ x]All other review of systems negative   [ ] Unable to obtain due to poor mentation     PHYSICAL EXAM:  Vital Signs Last 24 Hrs  T(C): 36.6 (28 Jan 2025 12:26), Max: 36.7 (27 Jan 2025 22:04)  T(F): 97.8 (28 Jan 2025 12:26), Max: 98.1 (27 Jan 2025 22:04)  HR: 110 (28 Jan 2025 12:26) (69 - 110)  BP: 129/80 (28 Jan 2025 12:26) (129/80 - 161/76)  BP(mean): --  RR: 18 (28 Jan 2025 12:26) (18 - 18)  SpO2: 99% (28 Jan 2025 12:26) (98% - 99%)    Parameters below as of 28 Jan 2025 12:26  Patient On (Oxygen Delivery Method): nasal cannula         I&O's Summary       GENERAL:  [x ]Alert  [x ]Oriented x 3  [ ]Lethargic  [ ]Cachexia  [ ]Unarousable  [x ]Verbal  [ ]Non-Verbal  [x ] No Distress  Behavioral:   [ ] Anxiety  [ ] Delirium [ ] Agitation [x ] Calm  [ ] Other  HEENT:  [x ]Normal  [ ] Temporal Wasting  [ ]Dry mouth   [ ]ET Tube/Trach  [ ]Oral lesions  [ ] Mucositis  PULMONARY:   [x ]Clear [ ]Tachypnea  [ ]Audible excessive secretions   [ ]Rhonchi        [ ]Right [ ]Left [ ]Bilateral  [ ]Crackles        [ ]Right [ ]Left [ ]Bilateral  [ ]Wheezing     [ ]Right [ ]Left [ ]Bilateral  [ ]Diminished breath sounds [ ]right [ ]left [ ]bilateral  CARDIOVASCULAR:    [x ]Regular [ ]Irregular [ ]Tachy  [ ]Anam [ ]Murmur [ ]Other  GASTROINTESTINAL:  [x ]Soft  [ ]Distended   [ ]+BS  [x ]Non tender [ ]Tender  [ ]PEG [ ]OGT/ NGT  Last BM: 1/26  GENITOURINARY:  [x ]Normal [ ] Incontinent   [ ]Oliguria/Anuria   [ ]Fung  MUSCULOSKELETAL:   [ x]Normal   [ ]Weakness  [ ]Bed/Wheelchair bound [ ]Edema  [  ] amputation  [  ] contraction  NEUROLOGIC:   [x ]No focal deficits  [ ]Cognitive impairment  [ ]Dysphagia [ ]Dysarthria [ ]Paresis [ ]Other   SKIN: See Nursing Skin Assessment for further details  [ x]Normal    [ ]Rash  [ ]Pressure ulcer(s)       Present on admission [ ]y [ ]n   [  ]  Wound    [  ] hyperpigmentation    CRITICAL CARE:  [ ]Shock Present  [ ]Septic [ ]Cardiogenic [ ]Neurologic [ ]Hypovolemic  [ ]Vasopressors [ ]Inotropes  [ ]Respiratory failure present [ ]Mechanical Ventilation [ ]Non-invasive ventilatory support [ ]High-Flow   [ ]Acute  [ ]Chronic [ ]Hypoxic  [ ]Hypercarbic [ ]Other  [ ]Other organ failure     LABS:  reviewed                         10.5   9.30  )-----------( 181      ( 28 Jan 2025 06:00 )             32.1   01-28    146[H]  |  104  |  7   ----------------------------<  72  3.2[L]   |  31  |  0.37[L]    Ca    8.2[L]      28 Jan 2025 06:00  Phos  3.6     01-28  Mg     2.30     01-28    TPro  5.0[L]  /  Alb  2.9[L]  /  TBili  0.7  /  DBili  x   /  AST  18  /  ALT  19  /  AlkPhos  67  01-28    Urinalysis Basic - ( 28 Jan 2025 06:00 )    Color: x / Appearance: x / SG: x / pH: x  Gluc: 72 mg/dL / Ketone: x  / Bili: x / Urobili: x   Blood: x / Protein: x / Nitrite: x   Leuk Esterase: x / RBC: x / WBC x   Sq Epi: x / Non Sq Epi: x / Bacteria: x      CAPILLARY BLOOD GLUCOSE      POCT Blood Glucose.: 243 mg/dL (28 Jan 2025 17:37)  POCT Blood Glucose.: 335 mg/dL (28 Jan 2025 13:25)  POCT Blood Glucose.: 195 mg/dL (28 Jan 2025 08:28)  POCT Blood Glucose.: 154 mg/dL (27 Jan 2025 21:52)          RADIOLOGY & ADDITIONAL STUDIES: reviewed     Protein Calorie Malnutrition Present: [ ]mild [ ]moderate [ ]severe [ ]underweight [ ]morbid obesity  https://www.andeal.org/vault/2440/web/files/ONC/Table_Clinical%20Characteristics%20to%20Document%20Malnutrition-White%20JV%20et%20al%202012.pdf    Height (cm): 147.32 (11-04-24 @ 16:00), 144.8 (10-19-24 @ 14:12), 144.8 (03-10-24 @ 23:45)  Weight (kg): 40.8 (01-26-25 @ 09:21), 45.1 (11-19-24 @ 16:00), 45.4 (10-19-24 @ 14:12)  BMI (kg/m2): 18.8 (01-26-25 @ 09:21), 20.8 (11-19-24 @ 16:00), 20.9 (11-04-24 @ 16:00)    [ ]PPSV2 < or = 30%  [ ]significant weight loss [ ]poor nutritional intake [ ]anasarca   [ ]Artificial Nutrition    REFERRALS:   [ ]Chaplaincy  [ ]Hospice  [ ]Child Life  [ ]Social Work  [x ]Case management [ ]Holistic Therapy

## 2025-01-28 NOTE — PROGRESS NOTE ADULT - SUBJECTIVE AND OBJECTIVE BOX
Patient is a 64y old  Female who presents with a chief complaint of Shortness of breath and chest pain (27 Jan 2025 14:48)      SUBJECTIVE / OVERNIGHT EVENTS: Pt fell overnight, says that her legs felt weak. Pt has no new complaints.  at bedside     ADDITIONAL REVIEW OF SYSTEMS:    MEDICATIONS  (STANDING):  atorvastatin 10 milliGRAM(s) Oral at bedtime  carvedilol 25 milliGRAM(s) Oral every 12 hours  chlorhexidine 2% Cloths 1 Application(s) Topical daily  dexAMETHasone     Tablet 4 milliGRAM(s) Oral daily  dextrose 5%. 1000 milliLiter(s) (50 mL/Hr) IV Continuous <Continuous>  dextrose 5%. 1000 milliLiter(s) (100 mL/Hr) IV Continuous <Continuous>  dextrose 50% Injectable 25 Gram(s) IV Push once  dextrose 50% Injectable 12.5 Gram(s) IV Push once  dextrose 50% Injectable 25 Gram(s) IV Push once  enoxaparin Injectable 30 milliGRAM(s) SubCutaneous every 24 hours  glucagon  Injectable 1 milliGRAM(s) IntraMuscular once  influenza  Vaccine (HIGH DOSE) 0.5 milliLiter(s) IntraMuscular once  insulin lispro (ADMELOG) corrective regimen sliding scale   SubCutaneous three times a day before meals  insulin lispro (ADMELOG) corrective regimen sliding scale   SubCutaneous at bedtime  losartan 50 milliGRAM(s) Oral daily  mirtazapine 7.5 milliGRAM(s) Oral at bedtime  montelukast 10 milliGRAM(s) Oral daily  multivitamin 1 Tablet(s) Oral daily  nystatin    Suspension 334847 Unit(s) Oral every 6 hours  oxyCODONE  ER Tablet 20 milliGRAM(s) Oral <User Schedule>  pantoprazole    Tablet 40 milliGRAM(s) Oral before breakfast  polyethylene glycol 3350 17 Gram(s) Oral two times a day  senna 2 Tablet(s) Oral at bedtime  trimethoprim  160 mG/sulfamethoxazole 800 mG 1 Tablet(s) Oral <User Schedule>    MEDICATIONS  (PRN):  albuterol/ipratropium for Nebulization 3 milliLiter(s) Nebulizer every 6 hours PRN Shortness of Breath and/or Wheezing  bisacodyl 5 milliGRAM(s) Oral every 12 hours PRN Constipation  dextrose Oral Gel 15 Gram(s) Oral once PRN Blood Glucose LESS THAN 70 milliGRAM(s)/deciliter  melatonin 3 milliGRAM(s) Oral at bedtime PRN Insomnia  metoclopramide Injectable 10 milliGRAM(s) IV Push every 8 hours PRN Nausea/vomiting  naloxone Injectable 0.1 milliGRAM(s) IV Push every 3 minutes PRN For ANY of the following changes in patient status:  A. RR LESS THAN 10 breaths per minute, B. Oxygen saturation LESS THAN 90%, C. Sedation score of 6  oxyCODONE    IR 10 milliGRAM(s) Oral every 3 hours PRN Moderate-severe pain      CAPILLARY BLOOD GLUCOSE      POCT Blood Glucose.: 335 mg/dL (28 Jan 2025 13:25)  POCT Blood Glucose.: 195 mg/dL (28 Jan 2025 08:28)  POCT Blood Glucose.: 154 mg/dL (27 Jan 2025 21:52)  POCT Blood Glucose.: 304 mg/dL (27 Jan 2025 17:56)    I&O's Summary      PHYSICAL EXAM:  Vital Signs Last 24 Hrs  T(C): 36.6 (28 Jan 2025 12:26), Max: 36.7 (27 Jan 2025 22:04)  T(F): 97.8 (28 Jan 2025 12:26), Max: 98.1 (27 Jan 2025 22:04)  HR: 110 (28 Jan 2025 12:26) (69 - 110)  BP: 129/80 (28 Jan 2025 12:26) (129/80 - 161/76)  BP(mean): --  RR: 18 (28 Jan 2025 12:26) (18 - 19)  SpO2: 99% (28 Jan 2025 12:26) (98% - 100%)    Parameters below as of 28 Jan 2025 12:26  Patient On (Oxygen Delivery Method): nasal cannula      CONSTITUTIONAL: NAD, cachectic   ENMT: Moist oral mucosa, no pharyngeal injection or exudates; normal dentition  NECK: Supple, no palpable masses; no thyromegaly  RESPIRATORY: Normal respiratory effort; lungs are clear to auscultation bilaterally  CARDIOVASCULAR: Regular rate and rhythm, normal S1 and S2, no murmur/rub/gallop; No lower extremity edema; Peripheral pulses are 2+ bilaterally  ABDOMEN: Nontender to palpation, normoactive bowel sounds, no rebound/guarding; No hepatosplenomegaly  MUSCULOSKELETAL:  No clubbing or cyanosis of digits; no joint swelling or tenderness to palpation  PSYCH: A+O to person, place, and time; affect appropriate  NEUROLOGY: CN 2-12 are intact and symmetric; no gross sensory deficits   SKIN: No rashes; no palpable lesions    LABS:                        10.5   9.30  )-----------( 181      ( 28 Jan 2025 06:00 )             32.1     01-28    146[H]  |  104  |  7   ----------------------------<  72  3.2[L]   |  31  |  0.37[L]    Ca    8.2[L]      28 Jan 2025 06:00  Phos  3.6     01-28  Mg     2.30     01-28    TPro  5.0[L]  /  Alb  2.9[L]  /  TBili  0.7  /  DBili  x   /  AST  18  /  ALT  19  /  AlkPhos  67  01-28          Urinalysis Basic - ( 28 Jan 2025 06:00 )    Color: x / Appearance: x / SG: x / pH: x  Gluc: 72 mg/dL / Ketone: x  / Bili: x / Urobili: x   Blood: x / Protein: x / Nitrite: x   Leuk Esterase: x / RBC: x / WBC x   Sq Epi: x / Non Sq Epi: x / Bacteria: x          RADIOLOGY & ADDITIONAL TESTS:  Results Reviewed:   Imaging Personally Reviewed:  Electrocardiogram Personally Reviewed:    COORDINATION OF CARE:  Care Discussed with Consultants/Other Providers [Y/N]:  Prior or Outpatient Records Reviewed [Y/N]:

## 2025-01-28 NOTE — PROGRESS NOTE ADULT - PROBLEM SELECTOR PLAN 4
- MOLST completed by primary team to reflect: DNR/DNI. Trial of NIV. No feeding tube.   - Primary HCP:   Hebert Slater ([phone]); Alternate HCP: Daughter Rachel Meehan ([phone])  - 1/28 - Patient shares she is interested in pursuing DMT- plan for inpatient DMT this admission

## 2025-01-28 NOTE — PROGRESS NOTE ADULT - CONVERSATION DETAILS
Patient and her  share that after speaking with Dr. Marx yesterday she is interested in pursuing DMT. They share plan for inpatient DMT to monitor her clinically afterwards and plan to continue DMT outpatient on discharge.

## 2025-01-28 NOTE — PROGRESS NOTE ADULT - PROBLEM SELECTOR PLAN 3
Worsening metastatic disease noted on CT imaging. Most recently was on everolimus- patient's daughter reports patient is no longer on any kind of treatment. Plan was to follow up with Dr. Marx outpatient to discuss other treatment options once [place] returned to the [place]  - C/w dexamethasone 4mg qd- patient did not tolerate SE of increased dose of 4mg BID  - C/w Protonix for GI ppx  - Currently on supplemental 2L O2 for O2 sat of 91-92% on RA- will need to check ambulatory O2 saturation to assess need for home oxygen  - Patient appears cachectic with severe protein-calorie malnutrition- nutrition consulted  - Discussed with oncology- plan to start Tarlatamab inpatient

## 2025-01-29 NOTE — SWALLOW BEDSIDE ASSESSMENT ADULT - ASR SWALLOW REFERRAL
RD to ensure adequate nutrition/patient may benefit from nutritonal supplements to maximize daily caloric intake.

## 2025-01-29 NOTE — PHYSICAL THERAPY INITIAL EVALUATION ADULT - NSPTDISCHREC_GEN_A_CORE
Home with Home PT and family assistance/Home PT
to address weakness and safety of home environment/Home PT

## 2025-01-29 NOTE — SWALLOW BEDSIDE ASSESSMENT ADULT - SWALLOW EVAL: DIAGNOSIS
1. Mild oral stage for regular solids as characterized by adequate oral acceptance/containment, slow/prolonged mastication with ability to breakdown the solid, adequate anterior-posterior transfer, and reduced oral clearance with trace lingual surface stasis post primary-swallow, which cleared upon liquid wash with thin liquid. 2. Functional oral stage for puree and thin liquids as characterized by adequate oral acceptance/containment, adequate anterior-posterior transfer, and adequate oral clearance. 3. Functional pharyngeal stage for regular solids, puree, and thin liquids as characterized by suspected timely initiation of the pharyngeal swallow trigger and +hyolaryngeal excursion upon digital palpation. No overt s/s airway penetration/aspiration evidenced and patient verbally declined globus/stuck sensation with all trialed consistencies.

## 2025-01-29 NOTE — SWALLOW BEDSIDE ASSESSMENT ADULT - ASR SWALLOW RECOMMEND DIAG
2. Objective testing not indicated at this time, given no overt s/s airway penetration/aspiration & chest imaging unremarkable for infection.

## 2025-01-29 NOTE — PHYSICAL THERAPY INITIAL EVALUATION ADULT - MANUAL MUSCLE TESTING RESULTS, REHAB EVAL
both UE / LE 3/5
Bilateral UE grossly >/= 3/5, bilateral LE grossly >/= 3+/5/grossly assessed due to

## 2025-01-29 NOTE — PHYSICAL THERAPY INITIAL EVALUATION ADULT - GAIT DEVIATIONS NOTED, PT EVAL
decreased marly/decreased step length/decreased stride length
decreased velocity of limb motion/decreased step length

## 2025-01-29 NOTE — SWALLOW BEDSIDE ASSESSMENT ADULT - COMMENTS
Progress Note- GOC- Palliative 1/28: "64 year old female with a history of lung neuroendocrine tumor with pulmonary, bone and brain metastases, s/p WBRT in 11/2024, most recently on everolimus, papillary thyroid carcinoma s/p partial thyroidectomy, HTN, HLD presenting with worsening SOB and chest pain; Palliative Care consulted for complex decision making  related to goals of care discussions in the setting of advanced illness/ evaluation and management of pain/ symptoms"    Progress Note-Adult Hospitalist 1/28: "64 year old female with a history of lung neuroendocrine tumor with pulmonary, bone and brain metastases, s/p WBRT in 11/2024, most recently on everolimus, papillary thyroid carcinoma s/p partial thyroidectomy, HTN, HLD presenting with worsening SOB and chest pain, likely due to worsening metastatic disease."    CXR 1/28: "FINDINGS: Bilateral nodular opacities greater in size and number on the left side increased since the last study secondary to pulmonary metastases. Chain sutures in the right midlung field. The heart is normal and there is no effusion or congestion to indicate CHF. No pneumothorax."    CTH 1/27: "IMPRESSION: No acute intracranial hemorrhage, mass effect, or shift of the midline structures." Progress Note- GOC- Palliative 1/28: "64 year old female with a history of lung neuroendocrine tumor with pulmonary, bone and brain metastases, s/p WBRT in 11/2024, most recently on everolimus, papillary thyroid carcinoma s/p partial thyroidectomy, HTN, HLD presenting with worsening SOB and chest pain; Palliative Care consulted for complex decision making  related to goals of care discussions in the setting of advanced illness/ evaluation and management of pain/ symptoms"    Progress Note-Adult Hospitalist 1/28: "64 year old female with a history of lung neuroendocrine tumor with pulmonary, bone and brain metastases, s/p WBRT in 11/2024, most recently on everolimus, papillary thyroid carcinoma s/p partial thyroidectomy, HTN, HLD presenting with worsening SOB and chest pain, likely due to worsening metastatic disease."    CXR 1/28: "FINDINGS: Bilateral nodular opacities greater in size and number on the left side increased since the last study secondary to pulmonary metastases. Chain sutures in the right midlung field. The heart is normal and there is no effusion or congestion to indicate CHF. No pneumothorax."    CTH 1/27: "IMPRESSION: No acute intracranial hemorrhage, mass effect, or shift of the midline structures."    Patient received resting reclined in bed and receiving supplemental O2 via Nasal Cannula; In no acute distress. Roused to alert state provided verbal cues and repositioned to upright. Able to make wants and needs known and follow simple verbal directives. Denied current difficulties swallowing (such as coughing, choking, or globus/stuck sensation during/following PO intake) and/or history of dysphagia. Agreeable to PO trials with SLP.

## 2025-01-29 NOTE — PROGRESS NOTE ADULT - PROBLEM SELECTOR PLAN 4
Lactate elevated to 8.5 on VBG, however without acidosis noted  - Likely Type B lactic acidosis in setting of malignancy  - Now resolved

## 2025-01-29 NOTE — PHYSICAL THERAPY INITIAL EVALUATION ADULT - LEVEL OF INDEPENDENCE: GAIT, REHAB EVAL
contact guard
Provided with 4L supplemental O2 during ambulation, HANSEL Eisenberg aware/contact guard

## 2025-01-29 NOTE — PROVIDER CONTACT NOTE (MEDICATION) - ASSESSMENT
/75, 78, 18 98.0 , 99% on 2lNC.  Pt with noted purpura/rash at 1720 only on rt arm. No c/o itching, SOB or wheezing. David FAROOQ to bedside for evaluation, Dr Trammell also assessed patient

## 2025-01-29 NOTE — CHART NOTE - NSCHARTNOTEFT_GEN_A_CORE
Notified by RN ~5pm pt with new onset right forearm rash. Pt was administered Tarlatamab earlier this afternoon. On exam, there is non-puritic, not tender red petechia of R forearm up to the antecubital fossa. No bullae or vesicles, no papules. Pt otherwise asymptomatic. Oncology attending Dr. Trammell also assessed at bedside. Plan to continue to monitor rash and monitor for CRS. No acute interventions at this time.     ---Brigida Guardado PA-C

## 2025-01-29 NOTE — PHYSICAL THERAPY INITIAL EVALUATION ADULT - ADDITIONAL COMMENTS
Patient lives with  in apartment, 1 flight of stairs, ambulated with out assistive device, owns a walker and cane.
Pt states she lives with her family in a house with 4 steps to enter and 1 flight inside. Prior to admission, pt was ambulating with the assistance of family. Pt states she had a fall in the hospital while walking back from the bathroom and tripping.   Post PT evaluation, pt left with head of bed elevated to 30 degrees, alarm on, call bell and remote within reach, all precautions maintained, NAD. RN aware.

## 2025-01-29 NOTE — PHYSICAL THERAPY INITIAL EVALUATION ADULT - CRITERIA FOR SKILLED THERAPEUTIC INTERVENTIONS
impairments found/functional limitations in following categories
impairments found/anticipated discharge recommendation

## 2025-01-29 NOTE — PROGRESS NOTE ADULT - PROBLEM SELECTOR PLAN 3
Worsening metastatic disease noted on CT imaging. Most recently was on everolimus- patient's daughter reports patient is no longer on any kind of treatment. Plan was to follow up with Dr. Marx outpatient to discuss other treatment options once [place] returned [place] the [place]  - C/w dexamethasone 4mg qd- patient did not tolerate SE of increased dose of 4mg BID  - C/w Protonix for GI ppx  - Currently on supplemental 2L O2 for O2 sat of 91-92% on RA- will need to check ambulatory O2 saturation to assess need for home oxygen  - Patient appears cachectic with severe protein-calorie malnutrition- nutrition consulted  - Discussed with oncology- s/p Tarlatamab on 1/29. Monitor for CRS

## 2025-01-29 NOTE — PHYSICAL THERAPY INITIAL EVALUATION ADULT - PLANNED THERAPY INTERVENTIONS, PT EVAL
balance training/bed mobility training/gait training/strengthening/transfer training
stairs training/balance training/gait training/strengthening/transfer training

## 2025-01-29 NOTE — PHARMACOTHERAPY INTERVENTION NOTE - COMMENTS
Pt received PO Dexamethasone 4 mg 1/29/25 06:19. Approved to administer additional 8mg Dexamethasone IVPB pre-medication for Tarla

## 2025-01-29 NOTE — PHYSICAL THERAPY INITIAL EVALUATION ADULT - PERTINENT HX OF CURRENT PROBLEM, REHAB EVAL
64 year old female Former smoker (1/2 PPD x 1 year; Quit 1982), w/ hx of HTN, HLD, papillary thyroid CA (s/p left hemithyroidectomy in 2021), pT2aN2 Stg IIIA lung neuroendocrine tumor with pulmonary, bone and brain metastases (completed 6cycles of Carbo/Etop July 2024, on Lanreotide q6w and everolimus qd, completed 5Fx WBRT 11/2024), HTN, HLD, DM presenting with worsening SOB and chest pain, likely due to worsening metastatic disease. CTA Chest w/ B/L pulmonary Mets, hepatic mets, Rt adrenal gland Mets, LE Duplex neg, on supplemental oxygen, plan for inpatient [facility] 1/29. C/c/b code fall, likely mechanical, CTH unremarkable, R foot xray w/o fx or dislocation.
Patient is 64 year old female with a history of lung neuroendocrine tumor with pulmonary, bone and brain metastases, s/p WBRT in 11/2024, most recently on everolimus, papillary thyroid carcinoma s/p partial thyroidectomy, HTN, HLD presenting with worsening shortness of breathing and chest pain.

## 2025-01-29 NOTE — PROGRESS NOTE ADULT - SUBJECTIVE AND OBJECTIVE BOX
Patient is a 64y old  Female who presents with a chief complaint of Shortness of breath and chest pain (28 Jan 2025 18:06)      SUBJECTIVE / OVERNIGHT EVENTS: No acute events overnight. Pt has no new complaints     ADDITIONAL REVIEW OF SYSTEMS:    MEDICATIONS  (STANDING):  albuterol/ipratropium for Nebulization 3 milliLiter(s) Nebulizer every 6 hours  atorvastatin 10 milliGRAM(s) Oral at bedtime  carvedilol 25 milliGRAM(s) Oral every 12 hours  chlorhexidine 2% Cloths 1 Application(s) Topical daily  cholecalciferol 1000 Unit(s) Oral daily  dexAMETHasone     Tablet 4 milliGRAM(s) Oral daily  dextrose 5%. 1000 milliLiter(s) (50 mL/Hr) IV Continuous <Continuous>  dextrose 5%. 1000 milliLiter(s) (100 mL/Hr) IV Continuous <Continuous>  dextrose 50% Injectable 25 Gram(s) IV Push once  dextrose 50% Injectable 12.5 Gram(s) IV Push once  dextrose 50% Injectable 25 Gram(s) IV Push once  enoxaparin Injectable 30 milliGRAM(s) SubCutaneous every 24 hours  glucagon  Injectable 1 milliGRAM(s) IntraMuscular once  influenza  Vaccine (HIGH DOSE) 0.5 milliLiter(s) IntraMuscular once  insulin lispro (ADMELOG) corrective regimen sliding scale   SubCutaneous three times a day before meals  insulin lispro (ADMELOG) corrective regimen sliding scale   SubCutaneous at bedtime  losartan 50 milliGRAM(s) Oral daily  mirtazapine 7.5 milliGRAM(s) Oral at bedtime  montelukast 10 milliGRAM(s) Oral daily  multivitamin 1 Tablet(s) Oral daily  nystatin    Suspension [id number] Unit(s) Oral every 6 hours  oxyCODONE  ER Tablet 20 milliGRAM(s) Oral <User Schedule>  pantoprazole    Tablet 40 milliGRAM(s) Oral before breakfast  polyethylene glycol 3350 17 Gram(s) Oral two times a day  senna 2 Tablet(s) Oral at bedtime  trimethoprim  160 mG/sulfamethoxazole 800 mG 1 Tablet(s) Oral <User Schedule>    MEDICATIONS  (PRN):  bisacodyl 5 milliGRAM(s) Oral every 12 hours PRN Constipation  dextrose Oral Gel 15 Gram(s) Oral once PRN Blood Glucose LESS THAN 70 milliGRAM(s)/deciliter  melatonin 3 milliGRAM(s) Oral at bedtime PRN Insomnia  metoclopramide Injectable 10 milliGRAM(s) IV Push every 8 hours PRN Nausea/vomiting  naloxone Injectable 0.1 milliGRAM(s) IV Push every 3 minutes PRN For ANY of the following changes in patient status:  A. RR LESS THAN 10 breaths per minute, B. Oxygen saturation LESS THAN 90%, C. Sedation score of 6  oxyCODONE    IR 10 milliGRAM(s) Oral every 3 hours PRN Moderate-severe pain      CAPILLARY BLOOD GLUCOSE      POCT Blood Glucose.: 305 mg/dL (29 Jan 2025 12:18)  POCT Blood Glucose.: 204 mg/dL (29 Jan 2025 08:33)  POCT Blood Glucose.: 254 mg/dL (28 Jan 2025 22:33)  POCT Blood Glucose.: 243 mg/dL (28 Jan 2025 17:37)    I&O's Summary      PHYSICAL EXAM:  Vital Signs Last 24 Hrs  T(C): 36.6 (29 Jan 2025 15:31), Max: 36.8 (29 Jan 2025 06:12)  T(F): 97.9 (29 Jan 2025 15:31), Max: 98.3 (29 Jan 2025 06:12)  HR: 86 (29 Jan 2025 15:31) (72 - 118)  BP: 138/79 (29 Jan 2025 15:31) (121/60 - 154/76)  BP(mean): --  RR: 18 (29 Jan 2025 15:31) (18 - 18)  SpO2: 97% (29 Jan 2025 15:31) (91% - 99%)    Parameters below as of 29 Jan 2025 15:31  Patient On (Oxygen Delivery Method): nasal cannula  O2 Flow (L/min): 2    CONSTITUTIONAL: NAD  EYES: PERRLA; conjunctiva and sclera clear  ENMT: Moist oral mucosa, no pharyngeal injection or exudates; normal dentition  NECK: Supple, no palpable masses; no thyromegaly  RESPIRATORY: Normal respiratory effort; lungs are clear to auscultation bilaterally  CARDIOVASCULAR: Regular rate and rhythm, normal S1 and S2, no murmur/rub/gallop; No lower extremity edema; Peripheral pulses are 2+ bilaterally  ABDOMEN: Nontender to palpation, normoactive bowel sounds, no rebound/guarding; No hepatosplenomegaly  MUSCULOSKELETAL:  No clubbing or cyanosis of digits; no joint swelling or tenderness to palpation  PSYCH: A+O to person, place, and time; affect appropriate  NEUROLOGY: CN 2-12 are intact and symmetric; no gross sensory deficits   SKIN: No rashes; no palpable lesions    LABS:                        10.3   9.38  )-----------( 188      ( 29 Jan 2025 06:17 )             31.9     01-29    145  |  106  |  9   ----------------------------<  156[H]  3.4[L]   |  29  |  0.41[L]    Ca    8.2[L]      29 Jan 2025 06:17  Phos  2.9     01-29  Mg     2.40     01-29    TPro  5.1[L]  /  Alb  2.9[L]  /  TBili  0.6  /  DBili  x   /  AST  18  /  ALT  18  /  AlkPhos  76  01-29          Urinalysis Basic - ( 29 Jan 2025 06:17 )    Color: x / Appearance: x / SG: x / pH: x  Gluc: 156 mg/dL / Ketone: x  / Bili: x / Urobili: x   Blood: x / Protein: x / Nitrite: x   Leuk Esterase: x / RBC: x / WBC x   Sq Epi: x / Non Sq Epi: x / Bacteria: x          RADIOLOGY & ADDITIONAL TESTS:  Results Reviewed:   Imaging Personally Reviewed:  Electrocardiogram Personally Reviewed:    COORDINATION OF CARE:  Care Discussed with Consultants/Other Providers [Y/N]:  Prior or Outpatient Records Reviewed [Y/N]:

## 2025-01-29 NOTE — SWALLOW BEDSIDE ASSESSMENT ADULT - ORAL PHASE
Trace lingual surface stasis post primary-swallow; Cleared with liquid wash with thin. Within functional limits

## 2025-01-29 NOTE — PHYSICAL THERAPY INITIAL EVALUATION ADULT - GENERAL OBSERVATIONS, REHAB EVAL
Pt received seated at edge of bed, with all lines intact, NAD, all precautions maintained. Pt on 4L O2 via NC.  and bedside.
Patient seen semi supine in bed, oxygen saturation 95% with 2 liters.

## 2025-01-30 NOTE — PROVIDER CONTACT NOTE (CRITICAL VALUE NOTIFICATION) - SITUATION
Lactate 5.6
Lactate (vbg) - 8.5  Potassium (vbg) - 2.8
Patient received tarlatamab yesterday, febrile x2. Lactate 5.2 on VBG. Toci started.

## 2025-01-30 NOTE — PROGRESS NOTE ADULT - NS ATTEST RISK PROBLEM GEN_ALL_CORE FT
1. Number and complexity of problems addressed for this patient:    1.1 Moderate (At least 1)  [ ] 1 or more chronic illnesses with exacerbation, progression, or side effects of treatment  [ ] 2 or more stable chronic illnesses  [ ] 1 undiagnosed new problem with uncertain prognosis  [ ] 1 acute illness with systemic symptoms  [ ] 1 acute complicated injury  1.2 High (At least 1)   [ x] 1 or more chronic illnesses with severe exacerbation, progression, or side effects of treatment  [x ] 1 acute or chronic illnesses or injuries that may pose a threat to life or bodily function    2. Amount and/or Complexity of Data that was Reviewed and Analyzed for this case:       Moderate (1 out of 3)       High (2 out of 3)  2.1. (Any combination of 3 of the following)   [x ] Prior External notes were reviewed  [ x] Each test result was reviewed (see "LABS" and "RADIOLOGY & ADDITIONAL STUDIES" above)  [ ] The following tests were ordered and/or reviewed (Only count 1 point for ordering or reviewing a unique test):  	[ ]CBC  	[ ] Chemistry   	[ ] Imaging   	[ ] Other:   [x ] Assessment requiring an independent historian   		Name of historian and relationship: Bedside RN   2.2  [ ] Personally review and interpretation of  image or testing   2.3  [x ] Discussion of management or test interpretation with external physician/other qualified health care professional\appropriate source (not separately reported)    3. Risk of Complications and/or Morbidity or Mortality of for this Patient’s Management:  3.1 Moderate risk of morbidity from additional diagnostic testing or treatment (At least 1):   [ x] Prescription drug management - on Oxycontin and Oxy IR which requires monitoring for side effects such as respiratory depression, constipation and opioid induced neurotoxicity as patient is at high risk due to advanced malignancy   [ ] Decision regarding minor surgery, treatment, or procedure with identified patient or procedure risk factors  [ ] Decision regarding elective major surgery, treatment, or procedure without identified patient or procedure risk factors   [ ] Diagnosis or treatment significantly limited by social determinants of health   [ ] Other:   3.2 High risk of morbidity from additional diagnostic testing or treatment (At least 1):   [ ] Drug therapy requiring intensive monitoring for toxicity   [ ] Decision regarding elective major surgery, treatment, or procedure with identified patient or procedure risk factors   [ ] Decision regarding emergency major surgery, treatment, or procedure   [ ] Decision regarding hospitalization or escalation of hospital-level of care  [ ] Decision not to resuscitate, not to intubate, or to de-escalate care because of poor prognosis   [ ] Decision to proceed or not with artificial nutrition   [ ] Parenteral controlled substance  [ ] Other:

## 2025-01-30 NOTE — CHART NOTE - NSCHARTNOTEFT_GEN_A_CORE
Notified by RN that patient is febrile to 100.7 F .   Patient is 64 year old female with a history of lung neuroendocrine tumor with pulmonary, bone and brain metastases, s/p WBRT in 11/2024, most recently on everolimus, papillary thyroid carcinoma s/p partial thyroidectomy, HTN, HLD presenting with worsening SOB and chest pain, likely due to worsening metastatic disease, and patient was administered Tarlatamab earlier yesterday  afternoon on 1/29.    Patient seen and assessed at bedside. Patient is alert and oriented x3. Patient is not in any acute distress. Patient denies  dizziness, shortness of breath, chest pain ,  headache ,   abdominal  pain, dysuria and nausea/ vomiting. Patient remained  hemodynamically stable with a BP of 148/80 mm hg without any signs of hypotension or hypoxia.      General: No distress on 2L  Lungs:  Clear to auscultation, No crackles, rhonchi, wheezing  Cardiac: Regular rate and rhythm, normal S1 and S2, no murmur/rub/gallop  Abd: Soft, non tender, non distended  Lower extremity: No edema    Assessment:   Patient is 64y female with a h/o lung neuroendocrine tumor with pulmonary, bone and brain metastases, s/p WBRT in 11/2024, most recently on everolimus, papillary thyroid carcinoma s/p partial thyroidectomy, HTN, HLD admitted with worsening SOB and chest pain, likely due to worsening metastatic disease, and patient was administered Tarlatamab earlier yesterday  afternoon on 1/29 now with fever of 100.7 likely secondary to Cytokine release syndrome Grade 1.   Plan:  - Supportive management includes,   - Ofirmev 600 mg iv x1 dose for fever  - NS 50 ml/ hour x 12 h   - Close monitoring for any signs of worsening hypoxia or hypotension.     Discussed with hem/onc fellow on call Dr Orellana, who agreed with the assessment of CRS grade 1, and  with the plan of supportive treatment of tylenol, IV fluids for now and continued monitoring for fever and any clinical deterioration.

## 2025-01-30 NOTE — PROGRESS NOTE ADULT - PROBLEM SELECTOR PLAN 4
- MOLST completed by primary team to reflect: DNR/DNI. Trial of NIV. No feeding tube.   - Primary HCP:   Hebert Slater ([phone]); Alternate HCP: Daughter Rachel Meehan ([phone])  - 1/28 - Patient shares she is interested in pursuing DMT- plan for inpatient DMT this admission

## 2025-01-30 NOTE — CHART NOTE - NSCHARTNOTEFT_GEN_A_CORE
Notified by RN ~6:17pm that pt is tachycardic persisting 140s-150s, BP 99/56 when baseline BPs have been 120s-130s, requiring 6L NC, currently afebrile but pending rectal temp. Pt is diaphoretic, c/o chest pressure and heart palpitations. Given pt is s/p Tarlatamab on 1/29 c/b grade 3 CRS s/p Tocilizumab earlier this am for hypoxia, Will dose additional Toci 8mg/kg STAT. Cardiology consulted for new onset afib with RVR and presented to bedside. Plan to give STAT dose of metop 25mg PO now and then q8hrs, start NS 100cc/hr, obtain TTE and start therapeutic anticoagulation with heparin gtt rather than T. Lovenox. Cont to monitor for fevers. Repeat CBC/BMP/mg/phos, trop, BNP, VBG w/ lactate ordered. Attending Dr. Trammell notified.     --Brigida Guardado PA-C Notified by RN ~6:17pm that pt is tachycardic persisting 140s-150s, BP 99/56 when baseline BPs have been 120s-130s, requiring 6L NC, currently afebrile but pending rectal temp. Pt is diaphoretic, c/o chest pressure and heart palpitations. Given pt is s/p Tarlatamab on 1/29 c/b grade 3 CRS s/p Tocilizumab earlier this am for hypoxia, Will dose additional Toci 8mg/kg STAT. Cardiology consulted for new onset afib with RVR and presented to bedside. Plan to give STAT dose of metop 25mg PO now and then q8hrs, start NS 100cc/hr, obtain TTE and start therapeutic anticoagulation with heparin gtt rather than T. Lovenox. Cont to monitor for fevers. Repeat CBC/BMP/mg/phos, trop, BNP, VBG w/ lactate ordered. IV tylenol ordered as pt diaphoretic and warm and cannot obtain rectal temp at this time. Oncology Attending Dr. Trammell notified and at bedside. Onc CAPRI longo at bedside. Of note, daughter confirmed pt's code status is DNR/DNI but amenable to trial NIV and escalation to ICU for closer monitoring.    Update: RRT called given ongoing respiratory distress and afib w/ RVR. Ordered dex 10mg q6h, HFNC, IV metop 2.5mg x 1 w/ improvement    --Brigida Ochoa PA-C

## 2025-01-30 NOTE — PROGRESS NOTE ADULT - PROBLEM SELECTOR PLAN 9
DVT ppx: Lovenox  Diet: DASH/CC  Dispo: Pending PT eval

## 2025-01-30 NOTE — PROGRESS NOTE ADULT - SUBJECTIVE AND OBJECTIVE BOX
Patient is a 64y old  Female who presents with a chief complaint of Shortness of breath and chest pain (29 Jan 2025 17:03)      SUBJECTIVE / OVERNIGHT EVENTS: Pt febrile overnight and this morning also hypoxic concerning for CRS.  Pt has no new complaints. Still has some shortness of breath and pain but reports this is unchanged from baseline. RN and ACP states pt appeared very uncomfortable this AM despite her report    ADDITIONAL REVIEW OF SYSTEMS:    MEDICATIONS  (STANDING):  albuterol/ipratropium for Nebulization 3 milliLiter(s) Nebulizer every 6 hours  atorvastatin 10 milliGRAM(s) Oral at bedtime  carvedilol 25 milliGRAM(s) Oral every 12 hours  chlorhexidine 2% Cloths 1 Application(s) Topical daily  cholecalciferol 1000 Unit(s) Oral daily  dexAMETHasone     Tablet 4 milliGRAM(s) Oral daily  dextrose 5%. 1000 milliLiter(s) (50 mL/Hr) IV Continuous <Continuous>  dextrose 5%. 1000 milliLiter(s) (100 mL/Hr) IV Continuous <Continuous>  dextrose 50% Injectable 25 Gram(s) IV Push once  dextrose 50% Injectable 12.5 Gram(s) IV Push once  dextrose 50% Injectable 25 Gram(s) IV Push once  enoxaparin Injectable 30 milliGRAM(s) SubCutaneous every 24 hours  glucagon  Injectable 1 milliGRAM(s) IntraMuscular once  influenza  Vaccine (HIGH DOSE) 0.5 milliLiter(s) IntraMuscular once  insulin lispro (ADMELOG) corrective regimen sliding scale   SubCutaneous three times a day before meals  insulin lispro (ADMELOG) corrective regimen sliding scale   SubCutaneous at bedtime  losartan 50 milliGRAM(s) Oral daily  mirtazapine 7.5 milliGRAM(s) Oral at bedtime  montelukast 10 milliGRAM(s) Oral daily  multivitamin 1 Tablet(s) Oral daily  nystatin    Suspension 397683 Unit(s) Oral every 6 hours  oxyCODONE  ER Tablet 20 milliGRAM(s) Oral <User Schedule>  pantoprazole    Tablet 40 milliGRAM(s) Oral before breakfast  polyethylene glycol 3350 17 Gram(s) Oral two times a day  senna 2 Tablet(s) Oral at bedtime  trimethoprim  160 mG/sulfamethoxazole 800 mG 1 Tablet(s) Oral <User Schedule>    MEDICATIONS  (PRN):  bisacodyl 5 milliGRAM(s) Oral every 12 hours PRN Constipation  dextrose Oral Gel 15 Gram(s) Oral once PRN Blood Glucose LESS THAN 70 milliGRAM(s)/deciliter  melatonin 3 milliGRAM(s) Oral at bedtime PRN Insomnia  metoclopramide Injectable 10 milliGRAM(s) IV Push every 8 hours PRN Nausea/vomiting  naloxone Injectable 0.1 milliGRAM(s) IV Push every 3 minutes PRN For ANY of the following changes in patient status:  A. RR LESS THAN 10 breaths per minute, B. Oxygen saturation LESS THAN 90%, C. Sedation score of 6  oxyCODONE    IR 10 milliGRAM(s) Oral every 3 hours PRN Moderate-severe pain      CAPILLARY BLOOD GLUCOSE      POCT Blood Glucose.: 288 mg/dL (30 Jan 2025 12:10)  POCT Blood Glucose.: 211 mg/dL (30 Jan 2025 08:19)  POCT Blood Glucose.: 221 mg/dL (29 Jan 2025 22:54)  POCT Blood Glucose.: 354 mg/dL (29 Jan 2025 18:20)  POCT Blood Glucose.: 370 mg/dL (29 Jan 2025 18:17)    I&O's Summary      PHYSICAL EXAM:  Vital Signs Last 24 Hrs  T(C): 36.6 (30 Jan 2025 12:25), Max: 38.9 (30 Jan 2025 08:40)  T(F): 97.8 (30 Jan 2025 12:25), Max: 102 (30 Jan 2025 08:40)  HR: 91 (30 Jan 2025 12:25) (76 - 99)  BP: 129/66 (30 Jan 2025 12:25) (124/63 - 153/76)  BP(mean): --  RR: 18 (30 Jan 2025 12:25) (17 - 18)  SpO2: 97% (30 Jan 2025 12:25) (88% - 99%)    Parameters below as of 30 Jan 2025 12:25  Patient On (Oxygen Delivery Method): nasal cannula      CONSTITUTIONAL: NAD  EYES: PERRLA; conjunctiva and sclera clear  ENMT: Moist oral mucosa, no pharyngeal injection or exudates; normal dentition  NECK: Supple, no palpable masses; no thyromegaly  RESPIRATORY: Normal respiratory effort; lungs are clear to auscultation bilaterally  CARDIOVASCULAR: Regular rate and rhythm, normal S1 and S2, no murmur/rub/gallop; No lower extremity edema; Peripheral pulses are 2+ bilaterally  ABDOMEN: Nontender to palpation, normoactive bowel sounds, no rebound/guarding; No hepatosplenomegaly  MUSCULOSKELETAL:  No clubbing or cyanosis of digits; no joint swelling or tenderness to palpation  PSYCH: A+O to person, place, and time; affect appropriate  NEUROLOGY: CN 2-12 are intact and symmetric; no gross sensory deficits   SKIN: No rashes; no palpable lesions    LABS:                        10.9   18.55 )-----------( 163      ( 30 Jan 2025 10:30 )             35.4     01-30    141  |  104  |  9   ----------------------------<  147[H]  3.9   |  23  |  0.41[L]    Ca    8.0[L]      30 Jan 2025 05:15  Phos  2.7     01-30  Mg     2.20     01-30    TPro  5.1[L]  /  Alb  2.9[L]  /  TBili  1.0  /  DBili  x   /  AST  23  /  ALT  19  /  AlkPhos  71  01-30          Urinalysis Basic - ( 30 Jan 2025 05:15 )    Color: x / Appearance: x / SG: x / pH: x  Gluc: 147 mg/dL / Ketone: x  / Bili: x / Urobili: x   Blood: x / Protein: x / Nitrite: x   Leuk Esterase: x / RBC: x / WBC x   Sq Epi: x / Non Sq Epi: x / Bacteria: x          RADIOLOGY & ADDITIONAL TESTS:  Results Reviewed:   Imaging Personally Reviewed:  Electrocardiogram Personally Reviewed:    COORDINATION OF CARE:  Care Discussed with Consultants/Other Providers [Y/N]:  Prior or Outpatient Records Reviewed [Y/N]:   Patient is a 64y old  Female who presents with a chief complaint of Shortness of breath and chest pain (29 Jan 2025 17:03)      SUBJECTIVE / OVERNIGHT EVENTS: Pt febrile overnight and this morning also hypoxic concerning for CRS.  Pt has no new complaints. Still has some shortness of breath and pain but reports this is unchanged from baseline. RN and ACP states pt appeared very uncomfortable this AM despite her report.  at bedside     ADDITIONAL REVIEW OF SYSTEMS:    MEDICATIONS  (STANDING):  albuterol/ipratropium for Nebulization 3 milliLiter(s) Nebulizer every 6 hours  atorvastatin 10 milliGRAM(s) Oral at bedtime  carvedilol 25 milliGRAM(s) Oral every 12 hours  chlorhexidine 2% Cloths 1 Application(s) Topical daily  cholecalciferol 1000 Unit(s) Oral daily  dexAMETHasone     Tablet 4 milliGRAM(s) Oral daily  dextrose 5%. 1000 milliLiter(s) (50 mL/Hr) IV Continuous <Continuous>  dextrose 5%. 1000 milliLiter(s) (100 mL/Hr) IV Continuous <Continuous>  dextrose 50% Injectable 25 Gram(s) IV Push once  dextrose 50% Injectable 12.5 Gram(s) IV Push once  dextrose 50% Injectable 25 Gram(s) IV Push once  enoxaparin Injectable 30 milliGRAM(s) SubCutaneous every 24 hours  glucagon  Injectable 1 milliGRAM(s) IntraMuscular once  influenza  Vaccine (HIGH DOSE) 0.5 milliLiter(s) IntraMuscular once  insulin lispro (ADMELOG) corrective regimen sliding scale   SubCutaneous three times a day before meals  insulin lispro (ADMELOG) corrective regimen sliding scale   SubCutaneous at bedtime  losartan 50 milliGRAM(s) Oral daily  mirtazapine 7.5 milliGRAM(s) Oral at bedtime  montelukast 10 milliGRAM(s) Oral daily  multivitamin 1 Tablet(s) Oral daily  nystatin    Suspension 531633 Unit(s) Oral every 6 hours  oxyCODONE  ER Tablet 20 milliGRAM(s) Oral <User Schedule>  pantoprazole    Tablet 40 milliGRAM(s) Oral before breakfast  polyethylene glycol 3350 17 Gram(s) Oral two times a day  senna 2 Tablet(s) Oral at bedtime  trimethoprim  160 mG/sulfamethoxazole 800 mG 1 Tablet(s) Oral <User Schedule>    MEDICATIONS  (PRN):  bisacodyl 5 milliGRAM(s) Oral every 12 hours PRN Constipation  dextrose Oral Gel 15 Gram(s) Oral once PRN Blood Glucose LESS THAN 70 milliGRAM(s)/deciliter  melatonin 3 milliGRAM(s) Oral at bedtime PRN Insomnia  metoclopramide Injectable 10 milliGRAM(s) IV Push every 8 hours PRN Nausea/vomiting  naloxone Injectable 0.1 milliGRAM(s) IV Push every 3 minutes PRN For ANY of the following changes in patient status:  A. RR LESS THAN 10 breaths per minute, B. Oxygen saturation LESS THAN 90%, C. Sedation score of 6  oxyCODONE    IR 10 milliGRAM(s) Oral every 3 hours PRN Moderate-severe pain      CAPILLARY BLOOD GLUCOSE      POCT Blood Glucose.: 288 mg/dL (30 Jan 2025 12:10)  POCT Blood Glucose.: 211 mg/dL (30 Jan 2025 08:19)  POCT Blood Glucose.: 221 mg/dL (29 Jan 2025 22:54)  POCT Blood Glucose.: 354 mg/dL (29 Jan 2025 18:20)  POCT Blood Glucose.: 370 mg/dL (29 Jan 2025 18:17)    I&O's Summary      PHYSICAL EXAM:  Vital Signs Last 24 Hrs  T(C): 36.6 (30 Jan 2025 12:25), Max: 38.9 (30 Jan 2025 08:40)  T(F): 97.8 (30 Jan 2025 12:25), Max: 102 (30 Jan 2025 08:40)  HR: 91 (30 Jan 2025 12:25) (76 - 99)  BP: 129/66 (30 Jan 2025 12:25) (124/63 - 153/76)  BP(mean): --  RR: 18 (30 Jan 2025 12:25) (17 - 18)  SpO2: 97% (30 Jan 2025 12:25) (88% - 99%)    Parameters below as of 30 Jan 2025 12:25  Patient On (Oxygen Delivery Method): nasal cannula      CONSTITUTIONAL: NAD  EYES: PERRLA; conjunctiva and sclera clear  ENMT: Moist oral mucosa, no pharyngeal injection or exudates; normal dentition  NECK: Supple, no palpable masses; no thyromegaly  RESPIRATORY: Normal respiratory effort; lungs are clear to auscultation bilaterally  CARDIOVASCULAR: Regular rate and rhythm, normal S1 and S2, no murmur/rub/gallop; No lower extremity edema; Peripheral pulses are 2+ bilaterally  ABDOMEN: Nontender to palpation, normoactive bowel sounds, no rebound/guarding; No hepatosplenomegaly  MUSCULOSKELETAL:  No clubbing or cyanosis of digits; no joint swelling or tenderness to palpation  PSYCH: A+O to person, place, and time; affect appropriate  NEUROLOGY: CN 2-12 are intact and symmetric; no gross sensory deficits   SKIN: No rashes; no palpable lesions    LABS:                        10.9   18.55 )-----------( 163      ( 30 Jan 2025 10:30 )             35.4     01-30    141  |  104  |  9   ----------------------------<  147[H]  3.9   |  23  |  0.41[L]    Ca    8.0[L]      30 Jan 2025 05:15  Phos  2.7     01-30  Mg     2.20     01-30    TPro  5.1[L]  /  Alb  2.9[L]  /  TBili  1.0  /  DBili  x   /  AST  23  /  ALT  19  /  AlkPhos  71  01-30          Urinalysis Basic - ( 30 Jan 2025 05:15 )    Color: x / Appearance: x / SG: x / pH: x  Gluc: 147 mg/dL / Ketone: x  / Bili: x / Urobili: x   Blood: x / Protein: x / Nitrite: x   Leuk Esterase: x / RBC: x / WBC x   Sq Epi: x / Non Sq Epi: x / Bacteria: x          RADIOLOGY & ADDITIONAL TESTS:  Results Reviewed:   Imaging Personally Reviewed:  Electrocardiogram Personally Reviewed:    COORDINATION OF CARE:  Care Discussed with Consultants/Other Providers [Y/N]:  Prior or Outpatient Records Reviewed [Y/N]:

## 2025-01-30 NOTE — RAPID RESPONSE TEAM SUMMARY - NSSITUATIONBACKGROUNDRRT_GEN_ALL_CORE
64 year old female with a history of lung neuroendocrine tumor with pulmonary, bone and brain metastases, s/p WBRT in 11/2024, most recently on everolimus, papillary thyroid carcinoma s/p partial thyroidectomy, HTN, HLD presenting with worsening SOB and chest pain, likely due to worsening metastatic disease. RRT called for hypoxia.  Attending Attestation (For Attendings USE Only)...

## 2025-01-30 NOTE — CHART NOTE - NSCHARTNOTEFT_GEN_A_CORE
RRT called for tachycardia and hypoxia 2/2 CRS.    Patient is a 64 year old female with a history of lung neuroendocrine tumor with pulmonary, bone and brain metastases, s/p WBRT in 11/2024, most recently on everolimus, papillary thyroid carcinoma s/p partial thyroidectomy, HTN, HLD presenting with worsening SOB and chest pain, likely due to worsening metastatic disease.   RRT called for hypoxia and tachycardia.   On arrival, VS notable for HR 110s, irregular, SpO2 90s, BP wnl, Temp 99.6 rectally, glucose wnl.   Patient is actively being treated for cytokine release syndrome. Patient placed on HFNC and weaned down to 40/40. Patient left on HFNC due to concern for desaturation primarily while moving or being moved, which precipitated the initial desaturation event causing the RRT to be called. IV Decadron & IV Tylenol given. HCP at bedside. Attending Dr. Trammell at bedside.    Vania Lagunas PA-C  Department of Medicine [id number]

## 2025-01-30 NOTE — CONSULT NOTE ADULT - ASSESSMENT
64 year old female with a history of lung neuroendocrine tumor with pulmonary, bone and brain metastases, s/p WBRT in 2024, most recently on everolimus, papillary thyroid carcinoma s/p partial thyroidectomy, HTN, and HLD presenting with worsening SOB and chest pain, likely due to worsening metastatic disease. Course complicated by cytokine release syndrome and new onset atrial fibrillation.     EK2025: Afib RVR without STD/EARL  CHADSVASC:  2  Rate Range: 100-150    Impression: The most likely substrate for the atrial fibrillation is cytokine release syndrome with critical illness and inflammatory state. Risk factors also include age, hypertension, and malignancy. The patient's rates not optimized.       1. Atrial fibrillation     Recommendations   - Rhythm/Rate Control: Start metoprolol tartrate 25mg q8 hours (hold parameter SBP 90)   - AC: Would suggest Heparin infusion at low PTT goal, BUT WOULD NOT START UNLESS CLEARED BY Hematology given brain metastatic disease   - CTM on telemetry   - Replete K >4 and Mg >2     Please notify Cardiology team with any clinical concerns or acute issues.     Eitan Doss MD  Cardiology Fellow  64 year old female with a history of lung neuroendocrine tumor with pulmonary, bone and brain metastases, s/p WBRT in 2024, most recently on everolimus, papillary thyroid carcinoma s/p partial thyroidectomy, HTN, and HLD presenting with worsening SOB and chest pain, likely due to worsening metastatic disease. Course complicated by cytokine release syndrome and new onset atrial fibrillation. Cardiology consulted for new onset atrial fibrillation     EK2025: Afib RVR without STD/EARL  CHADSVASC:  2  Rate Range: 100-150    Impression: The most likely substrate for the atrial fibrillation is cytokine release syndrome with critical illness, fevers, and inflammatory state. Risk factors also include age, hypertension, and malignancy. The patient's rates not optimized likely due to recurrent fevers and insensible losses contributing to volume depletion. Patient appears intravascularly depleted with some capillary delay and flat jugular veins. However, she has hypoxia due to malignancy and related edema in the lungs.      1. Atrial fibrillation     Recommendations   - Rhythm/Rate Control: Start metoprolol tartrate 25mg q8 hours (hold parameter SBP 90). DC carvedilol   - AC: Would suggest Heparin infusion if AC is not contraindicated given CHADSVASc of 2, but at low PTT goal, WOULD NOT START UNLESS CLEARED BY Hematology given brain metastatic disease   - Please obtain TTE to assess LV function   - Avoid fevers, and treat elevated temp >100.4 with acetaminophen and ice packs if needed  - Consider 500cc IVF over 5 hours with LR to improve intravascular volume depletion  - CTM on telemetry   - Replete K >4 and Mg >2     Please notify Cardiology team with any clinical concerns or acute issues.     Eitan Doss MD  Cardiology Fellow  64 year old female with a history of lung neuroendocrine tumor with pulmonary, bone and brain metastases, s/p WBRT in 2024, most recently on everolimus, papillary thyroid carcinoma s/p partial thyroidectomy, HTN, and HLD presenting with worsening SOB and chest pain, likely due to worsening metastatic disease. Course complicated by cytokine release syndrome and new onset atrial fibrillation. Cardiology consulted for new onset atrial fibrillation     EK2025: Afib RVR without STD/EARL  CHADSVASC:  2  Rate Range: 100-150    Impression: The most likely substrate for the atrial fibrillation is cytokine release syndrome with critical illness, fevers, and inflammatory state. Risk factors also include age, hypertension, and malignancy. The patient's rates not optimized likely due to recurrent fevers and insensible losses contributing to volume depletion. Patient appears intravascularly depleted with some capillary delay and flat jugular veins. However, she has hypoxia due to malignancy and related edema in the lungs.      1. Atrial fibrillation with RVR   2. Acute hypoxic respiratory failure   3. metastatic Lung ca    Recommendations   - Rhythm/Rate Control: Start metoprolol tartrate 25mg q8 hours (hold parameter SBP 90). DC carvedilol   - AC: Would suggest Heparin infusion if AC is not contraindicated given CHADSVASc of 2, but at low PTT goal, WOULD NOT START UNLESS CLEARED BY Hematology given brain metastatic disease   - Please obtain TTE to assess LV function   - Avoid fevers, and treat elevated temp >100.4 with acetaminophen and ice packs if needed  - Consider 500cc IVF over 5 hours with LR to improve intravascular volume depletion  - CTM on telemetry   - Replete K >4 and Mg >2     Please notify Cardiology team with any clinical concerns or acute issues.     Eitan Doss MD  Cardiology Fellow

## 2025-01-30 NOTE — PROVIDER CONTACT NOTE (OTHER) - ASSESSMENT
Requiring more oxygen, especially with ambulation- placed on NRB when walking to and from bathroom. Tachycardic to 140s however, does not meet parameter for carvedilol. Feels chest heaviness and palpitations, denies pain.

## 2025-01-30 NOTE — PROGRESS NOTE ADULT - ASSESSMENT
64 year old female with a history of lung neuroendocrine tumor with pulmonary, bone and brain metastases, s/p WBRT in 11/2024, most recently on everolimus, papillary thyroid carcinoma s/p partial thyroidectomy, HTN, HLD presenting with worsening SOB and chest pain, likely due to worsening metastatic disease.    Diagnoses:   Neuroendocrine tumor of the lung metastatic to brain and bone on immunotherapy   Immunocompromised state  Hypercoagulable state secondary to neuroendocrine tumor   Severe protein-calorie malnutrition  Steroid induced hyperglycemia   Elevated lactate   Hypokalemia   HTN   Oral thrush   Opioid induced constipation   Edema   Acute hypoxic respiratory failure  Cytokine release syndrome

## 2025-01-30 NOTE — PROGRESS NOTE ADULT - PROBLEM SELECTOR PLAN 1
- Patient with lung neuroendocrine tumor with pulmonary, bone and brain metastases, s/p WBRT in 11/2024, most recently on everolimus.   - CT 1/26 - Findings suggestive of bilateral pulmonary metastases, hepatic   metastases, right adrenal gland metastasis as well as metastatic implants   within the subcutaneous tissues of the anterior chest wall/both breasts.  Mediastinal/bilateral hilar adenopathy.  - Follows with Francisco J  - Patient shares she is interested in pursuing DMT- plan for inpatient DMT this admission- s/p [place] on 1/29

## 2025-01-30 NOTE — CONSULT NOTE ADULT - SUBJECTIVE AND OBJECTIVE BOX
Cardiology Consult Note   [Please check [email or web address] password: [email or web address]" for cardiology service schedule and contact information]    HPI:  64 year old female with a history of lung neuroendocrine tumor with pulmonary, bone and brain metastases, s/p WBRT in 2024, most recently on everolimus, papillary thyroid carcinoma s/p partial thyroidectomy, HTN, HLD presenting with worsening SOB and chest pain. Patient's daughter assisted with history. Patient recently returned from a 2 month trip to the [facility]- returned 3 days ago. During the past 2 months she has had worsening fatigue and low appetite, as well as worsening balance/gait. She has had worsening pain especially in her L rib area and back. Oxycodone helps with the pain when she takes it- usually takes it about every 4-5 hours during the day as needed. Overnight she usually tries to go without taking the oxycodone but wakes up in pain. Denies constipation- previously used to have diarrhea but now has normal BMs while on narcotics. Pt previously was on dexamethasone 4mg BID at the start of her trip, but then noticed worsening swelling in her legs and developed moon facies and spoke with her oncology team who recommended decreasing the dose to 4mg qd. Patient denies fevers but does often feel cold; denies nausea or abdominal pain, no URI symptoms. (2025 18:41)      PAST MEDICAL & SURGICAL HISTORY:  Hypertension      Hyperlipidemia      Former smoker  1/2 ppd x 1yr      Thyroid cancer      Diabetes mellitus      Neuroendocrine neoplasm of lung      Cancer with pulmonary metastases      H/O  section      S/P left oophorectomy      H/O: hysterectomy      H/O fracture of leg  left      History of thyroid surgery  partial rt thyroid ca      History of lung surgery  09/2021 x2        FAMILY HISTORY:  FH: CAD (coronary artery disease) (Father)      SOCIAL HISTORY:  unchanged    MEDICATIONS:  losartan 50 milliGRAM(s) Oral daily  metoprolol tartrate 25 milliGRAM(s) Oral every 8 hours    nystatin    Suspension 932210 Unit(s) Oral every 6 hours  trimethoprim  160 mG/sulfamethoxazole 800 mG 1 Tablet(s) Oral <User Schedule>    montelukast 10 milliGRAM(s) Oral daily    LORazepam     Tablet 0.25 milliGRAM(s) Oral every 6 hours PRN  melatonin 3 milliGRAM(s) Oral at bedtime PRN  metoclopramide Injectable 10 milliGRAM(s) IV Push every 8 hours PRN  mirtazapine 7.5 milliGRAM(s) Oral at bedtime  oxyCODONE    IR 10 milliGRAM(s) Oral every 3 hours PRN  oxyCODONE  ER Tablet 20 milliGRAM(s) Oral <User Schedule>    bisacodyl 5 milliGRAM(s) Oral every 12 hours PRN  pantoprazole    Tablet 40 milliGRAM(s) Oral before breakfast  polyethylene glycol 3350 17 Gram(s) Oral two times a day  senna 2 Tablet(s) Oral at bedtime    atorvastatin 10 milliGRAM(s) Oral at bedtime  dexAMETHasone     Tablet 4 milliGRAM(s) Oral daily  dextrose 50% Injectable 25 Gram(s) IV Push once  dextrose 50% Injectable 12.5 Gram(s) IV Push once  dextrose 50% Injectable 25 Gram(s) IV Push once  dextrose Oral Gel 15 Gram(s) Oral once PRN  glucagon  Injectable 1 milliGRAM(s) IntraMuscular once  insulin lispro (ADMELOG) corrective regimen sliding scale   SubCutaneous three times a day before meals  insulin lispro (ADMELOG) corrective regimen sliding scale   SubCutaneous at bedtime    chlorhexidine 2% Cloths 1 Application(s) Topical daily  cholecalciferol 1000 Unit(s) Oral daily  dextrose 5%. 1000 milliLiter(s) IV Continuous <Continuous>  dextrose 5%. 1000 milliLiter(s) IV Continuous <Continuous>  influenza  Vaccine (HIGH DOSE) 0.5 milliLiter(s) IntraMuscular once  multivitamin 1 Tablet(s) Oral daily  sodium chloride 0.9%. 1000 milliLiter(s) IV Continuous <Continuous>        -------------------------------------------------------------------------------------------  PHYSICAL EXAM:  T(C): 36.4 (25 @ 18:00), Max: 38.9 (25 @ 08:40)  HR: 140 (25 @ 18:00) (80 - 140)  BP: 99/56 (25 @ 18:00) (99/56 - 153/76)  RR: 17 (25 @ 18:00) (17 - 18)  SpO2: 91% (25 @ 18:00) (74% - 99%)  Wt(kg): --  I&O's Summary    2025 07:01  -  2025 19:22  --------------------------------------------------------  IN: 450 mL / OUT: 0 mL / NET: 450 mL        GENERAL: NAD  HEAD: Atraumatic, Normocephalic.  ENT: Moist mucous membranes.  NECK: Supple, No JVD.  CHEST/LUNG: Clear to auscultation bilaterally; No rales, rhonchi, wheezing, or rubs. Unlabored respirations.  HEART: Regular rate and rhythm; No murmurs, rubs, or gallops.  ABDOMEN: Bowel sounds present; Soft, Nontender, Nondistended.   EXTREMITIES:  2+ Peripheral Pulses, brisk capillary refill. No clubbing, cyanosis, or edema.    -------------------------------------------------------------------------------------------  LABS:                          10.8   17.99 )-----------( 155      ( 2025 18:52 )             35.0         141  |  104  |  9   ----------------------------<  147[H]  3.9   |  23  |  0.41[L]    Ca    8.0[L]      2025 05:15  Phos  2.7       Mg     2.20         TPro  5.1[L]  /  Alb  2.9[L]  /  TBili  1.0  /  DBili  x   /  AST  23  /  ALT  19  /  AlkPhos  71        CARDIAC MARKERS ( 2025 18:52 )  25 ng/L / x     / x     / x     / x     / x      CARDIAC MARKERS ( 2025 14:00 )  22 ng/L / x     / x     / x     / x     / x      CARDIAC MARKERS ( 2025 10:48 )  25 ng/L / x     / x     / x     / x     / x          nystatin    Suspension 647521 Unit(s) Oral every 6 hours  trimethoprim  160 mG/sulfamethoxazole 800 mG 1 Tablet(s) Oral <User Schedule>    montelukast 10 milliGRAM(s) Oral daily    LORazepam     Tablet 0.25 milliGRAM(s) Oral every 6 hours PRN  melatonin 3 milliGRAM(s) Oral at bedtime PRN  metoclopramide Injectable 10 milliGRAM(s) IV Push every 8 hours PRN  mirtazapine 7.5 milliGRAM(s) Oral at bedtime  oxyCODONE    IR 10 milliGRAM(s) Oral every 3 hours PRN  oxyCODONE  ER Tablet 20 milliGRAM(s) Oral <User Schedule>     Cardiology Consult Note   [Please check [email or web address] password: [email or web address]" for cardiology service schedule and contact information]    HPI:  64 year old female with a history of lung neuroendocrine tumor with pulmonary, bone and brain metastases, s/p WBRT in 2024, most recently on everolimus, papillary thyroid carcinoma s/p partial thyroidectomy, HTN, HLD presenting with worsening SOB and chest pain. Patient's daughter assisted with history. Patient recently returned from a 2 month trip to the [facility]- returned 3 days ago. During the past 2 months she has had worsening fatigue and low appetite, as well as worsening balance/gait. She has had worsening pain especially in her L rib area and back. Oxycodone helps with the pain when she takes it- usually takes it about every 4-5 hours during the day as needed. Overnight she usually tries to go without taking the oxycodone but wakes up in pain. Denies constipation- previously used to have diarrhea but now has normal BMs while on narcotics. Pt previously was on dexamethasone 4mg BID at the start of her trip, but then noticed worsening swelling in her legs and developed moon facies and spoke with her oncology team who recommended decreasing the dose to 4mg qd. Patient denies fevers but does often feel cold; denies nausea or abdominal pain, no URI symptoms.       PAST MEDICAL & SURGICAL HISTORY:  Hypertension      Hyperlipidemia      Former smoker  1/2 ppd x 1yr      Thyroid cancer      Diabetes mellitus      Neuroendocrine neoplasm of lung      Cancer with pulmonary metastases      H/O  section      S/P left oophorectomy      H/O: hysterectomy      H/O fracture of leg  left      History of thyroid surgery  partial rt thyroid ca      History of lung surgery  09/2021 x2        FAMILY HISTORY:  FH: CAD (coronary artery disease) (Father)      SOCIAL HISTORY:  unchanged    MEDICATIONS:  losartan 50 milliGRAM(s) Oral daily  metoprolol tartrate 25 milliGRAM(s) Oral every 8 hours    nystatin    Suspension 454643 Unit(s) Oral every 6 hours  trimethoprim  160 mG/sulfamethoxazole 800 mG 1 Tablet(s) Oral <User Schedule>    montelukast 10 milliGRAM(s) Oral daily    LORazepam     Tablet 0.25 milliGRAM(s) Oral every 6 hours PRN  melatonin 3 milliGRAM(s) Oral at bedtime PRN  metoclopramide Injectable 10 milliGRAM(s) IV Push every 8 hours PRN  mirtazapine 7.5 milliGRAM(s) Oral at bedtime  oxyCODONE    IR 10 milliGRAM(s) Oral every 3 hours PRN  oxyCODONE  ER Tablet 20 milliGRAM(s) Oral <User Schedule>    bisacodyl 5 milliGRAM(s) Oral every 12 hours PRN  pantoprazole    Tablet 40 milliGRAM(s) Oral before breakfast  polyethylene glycol 3350 17 Gram(s) Oral two times a day  senna 2 Tablet(s) Oral at bedtime    atorvastatin 10 milliGRAM(s) Oral at bedtime  dexAMETHasone     Tablet 4 milliGRAM(s) Oral daily  dextrose 50% Injectable 25 Gram(s) IV Push once  dextrose 50% Injectable 12.5 Gram(s) IV Push once  dextrose 50% Injectable 25 Gram(s) IV Push once  dextrose Oral Gel 15 Gram(s) Oral once PRN  glucagon  Injectable 1 milliGRAM(s) IntraMuscular once  insulin lispro (ADMELOG) corrective regimen sliding scale   SubCutaneous three times a day before meals  insulin lispro (ADMELOG) corrective regimen sliding scale   SubCutaneous at bedtime    chlorhexidine 2% Cloths 1 Application(s) Topical daily  cholecalciferol 1000 Unit(s) Oral daily  dextrose 5%. 1000 milliLiter(s) IV Continuous <Continuous>  dextrose 5%. 1000 milliLiter(s) IV Continuous <Continuous>  influenza  Vaccine (HIGH DOSE) 0.5 milliLiter(s) IntraMuscular once  multivitamin 1 Tablet(s) Oral daily  sodium chloride 0.9%. 1000 milliLiter(s) IV Continuous <Continuous>        -------------------------------------------------------------------------------------------  PHYSICAL EXAM:  T(C): 36.4 (25 @ 18:00), Max: 38.9 (25 @ 08:40)  HR: 140 (25 @ 18:00) (80 - 140)  BP: 99/56 (25 @ 18:00) (99/56 - 153/76)  RR: 17 (25 @ 18:00) (17 - 18)  SpO2: 91% (25 @ 18:00) (74% - 99%)  Wt(kg): --  I&O's Summary    2025 07:01  -  2025 19:22  --------------------------------------------------------  IN: 450 mL / OUT: 0 mL / NET: 450 mL        GENERAL: NAD, Cachectic, frail   HEAD: Atraumatic, Normocephalic.  ENT: Moist mucous membranes.  NECK: Supple, Flat JV.  CHEST/LUNG: Clear to auscultation bilaterally; No rales, rhonchi, wheezing, or rubs. Unlabored respirations.  HEART: Irregular rate and rhythm; No murmurs, rubs, or gallops.  ABDOMEN: Bowel sounds present; Soft, Nontender, Nondistended.   EXTREMITIES:  2+ Peripheral Pulses, Delayed capillary refill. No clubbing, cyanosis, 1-2+ edema.    -------------------------------------------------------------------------------------------  LABS:                          10.8   17.99 )-----------( 155      ( 2025 18:52 )             35.0         141  |  104  |  9   ----------------------------<  147[H]  3.9   |  23  |  0.41[L]    Ca    8.0[L]      2025 05:15  Phos  2.7       Mg     2.20         TPro  5.1[L]  /  Alb  2.9[L]  /  TBili  1.0  /  DBili  x   /  AST  23  /  ALT  19  /  AlkPhos  71        CARDIAC MARKERS ( 2025 18:52 )  25 ng/L / x     / x     / x     / x     / x      CARDIAC MARKERS ( 2025 14:00 )  22 ng/L / x     / x     / x     / x     / x      CARDIAC MARKERS ( 2025 10:48 )  25 ng/L / x     / x     / x     / x     / x          nystatin    Suspension 041343 Unit(s) Oral every 6 hours  trimethoprim  160 mG/sulfamethoxazole 800 mG 1 Tablet(s) Oral <User Schedule>    montelukast 10 milliGRAM(s) Oral daily    LORazepam     Tablet 0.25 milliGRAM(s) Oral every 6 hours PRN  melatonin 3 milliGRAM(s) Oral at bedtime PRN  metoclopramide Injectable 10 milliGRAM(s) IV Push every 8 hours PRN  mirtazapine 7.5 milliGRAM(s) Oral at bedtime  oxyCODONE    IR 10 milliGRAM(s) Oral every 3 hours PRN  oxyCODONE  ER Tablet 20 milliGRAM(s) Oral <User Schedule>

## 2025-01-30 NOTE — PROGRESS NOTE ADULT - PROBLEM SELECTOR PLAN 3
- Pt had unwitnessed fall in her room. Denies any LOC or head trauma  - CTH neg  - No focal neurological findings  - Pt denies any pain. Has some chronic back pain, unchanged  - Consult PT   - Fall likely secondary to generalized weakness/deconditioning. Low suspicion for CNS or cardiac cause

## 2025-01-30 NOTE — PROVIDER CONTACT NOTE (CRITICAL VALUE NOTIFICATION) - ASSESSMENT
VSS. AOx4, awake in bed. 97% O2 on 2L NC. Pt denies chest pain and SOB
Patient received tarlatamab yesterday, febrile x2. Lactate 5.2 on VBG. Toci started.
Pt AxOx3. Respirations even and unlabored. Vitals signs stable.

## 2025-01-30 NOTE — PROGRESS NOTE ADULT - PROBLEM SELECTOR PLAN 1
Worsening metastatic disease noted on CT imaging. Most recently was on everolimus- patient's daughter reports patient is no longer on any kind of treatment. Plan was to follow up with Dr. Marx outpatient to discuss other treatment options once patient returned to the [place]  - C/w dexamethasone 4mg qd- patient did not tolerate SE of increased dose of 4mg BID  - C/w Protonix for GI ppx  - Currently on supplemental 2L O2 for O2 sat of 91-92% on RA- will need to check ambulatory O2 saturation to assess need for home oxygen  - Patient appears cachectic with severe protein-calorie malnutrition- nutrition consulted  - Discussed with oncology- s/p Tarlatamab on 1/29. Pt now febrile and hypoxic c/w CRS  - Will give Tocilizumab. Continue supportive care

## 2025-01-30 NOTE — PROGRESS NOTE ADULT - SUBJECTIVE AND OBJECTIVE BOX
Date of Service  : 1/30/2025  Indication for Geriatrics and Palliative Care Services: goals of care     INTERVAL HPI/OVERNIGHT EVENTS:  s/p Tarlatamab on 1/29  Patient febrile overnight and this morning also hypoxic. Per primary team, there is concern for CRS.   In past 24 hours, received 2 prn doses of PO Oxycodone IR 10mg    SUBJECTIVE AND OBJECTIVE:  Patient seen and examined with  at bedside. Patient states she feels fatigued this morning as she was unable to sleep last night. She denies feeling feeling more dyspneic than her baseline. Also reports pain is controlled     Allergies    No Known Allergies    Intolerances    MEDICATIONS  (STANDING):  albuterol/ipratropium for Nebulization 3 milliLiter(s) Nebulizer every 6 hours  atorvastatin 10 milliGRAM(s) Oral at bedtime  carvedilol 25 milliGRAM(s) Oral every 12 hours  chlorhexidine 2% Cloths 1 Application(s) Topical daily  cholecalciferol 1000 Unit(s) Oral daily  dexAMETHasone     Tablet 4 milliGRAM(s) Oral daily  dextrose 5%. 1000 milliLiter(s) (50 mL/Hr) IV Continuous <Continuous>  dextrose 5%. 1000 milliLiter(s) (100 mL/Hr) IV Continuous <Continuous>  dextrose 50% Injectable 25 Gram(s) IV Push once  dextrose 50% Injectable 12.5 Gram(s) IV Push once  dextrose 50% Injectable 25 Gram(s) IV Push once  enoxaparin Injectable 30 milliGRAM(s) SubCutaneous every 24 hours  glucagon  Injectable 1 milliGRAM(s) IntraMuscular once  influenza  Vaccine (HIGH DOSE) 0.5 milliLiter(s) IntraMuscular once  insulin lispro (ADMELOG) corrective regimen sliding scale   SubCutaneous three times a day before meals  insulin lispro (ADMELOG) corrective regimen sliding scale   SubCutaneous at bedtime  losartan 50 milliGRAM(s) Oral daily  mirtazapine 7.5 milliGRAM(s) Oral at bedtime  montelukast 10 milliGRAM(s) Oral daily  multivitamin 1 Tablet(s) Oral daily  nystatin    Suspension 811426 Unit(s) Oral every 6 hours  oxyCODONE  ER Tablet 20 milliGRAM(s) Oral <User Schedule>  pantoprazole    Tablet 40 milliGRAM(s) Oral before breakfast  polyethylene glycol 3350 17 Gram(s) Oral two times a day  senna 2 Tablet(s) Oral at bedtime  tocilizumab-aazg (TYENNE) IVPB 320 milliGRAM(s) IV Intermittent once  trimethoprim  160 mG/sulfamethoxazole 800 mG 1 Tablet(s) Oral <User Schedule>    MEDICATIONS  (PRN):  bisacodyl 5 milliGRAM(s) Oral every 12 hours PRN Constipation  dextrose Oral Gel 15 Gram(s) Oral once PRN Blood Glucose LESS THAN 70 milliGRAM(s)/deciliter  LORazepam     Tablet 0.25 milliGRAM(s) Oral every 6 hours PRN Anxiety  melatonin 3 milliGRAM(s) Oral at bedtime PRN Insomnia  metoclopramide Injectable 10 milliGRAM(s) IV Push every 8 hours PRN Nausea/vomiting  naloxone Injectable 0.1 milliGRAM(s) IV Push every 3 minutes PRN For ANY of the following changes in patient status:  A. RR LESS THAN 10 breaths per minute, B. Oxygen saturation LESS THAN 90%, C. Sedation score of 6  oxyCODONE    IR 10 milliGRAM(s) Oral every 3 hours PRN Moderate-severe pain        ITEMS UNCHECKED ARE NOT PRESENT    PRESENT SYMPTOMS: [ ]Unable to self-report due to altered mental status- see [ ] CPOT [ ] PAINADS [ ] RDOS  Source if other than patient:  [ ]Family   [ ]Team     Pain: [x ]yes [ ]no  QOL impact - moderate  Location -   lower back                  Aggravating factors - movement   Quality - sharp  Radiation - none   Timing- intermittent   Severity (0-10 scale): 3  Minimal acceptable level / Pain goal (0-10 scale): 3    Dyspnea:                           [ ]Mild [ x]Moderate [ ]Severe  Anxiety:                             [ ]Mild [ ]Moderate [ ]Severe  Agitation:                          [ ]Mild [ ]Moderate [ ]Severe  Fatigue:                             [ ]Mild [ ]Moderate [ ]Severe  Nausea:                             [ ]Mild [ ]Moderate [ ]Severe  Loss of appetite:              [ ]Mild [ x]Moderate [ ]Severe  Constipation:                   [ ]Mild [ ]Moderate [ ]Severe  Diarrhea:                          [ ]Mild [ ]Moderate [ ]Severe      CPOT:    https://www.[email or web address]/getattachment/[email or web address]/Critical-Care-Pain-Observation-Tool-(CPOT)    PCSSQ[Palliative Care Spiritual Screening Question]   Severity (0-10):  Score of 4 or > indicate consideration of Chaplaincy referral.  Chaplaincy Referral: [ ] yes [ ] refused [ ] following [x ] deferred    Caregiver [place]? : [ ] yes [ ] no [ ] Declined [x ] Deferred              Social work referral [ ] Patient & Family Centered Care Referral [ ]     Anticipatory Grief present?:  [ ] yes [ ] no  [ x] Deferred                  Social work referral [ ] Chaplaincy Referral[ ]    Other Symptoms:  [ x]All other review of systems negative   [ ] Unable to obtain due to poor mentation     PHYSICAL EXAM:  Vital Signs Last 24 Hrs  T(C): 36.4 (30 Jan 2025 18:00), Max: 38.9 (30 Jan 2025 08:40)  T(F): 97.6 (30 Jan 2025 18:00), Max: 102 (30 Jan 2025 08:40)  HR: 140 (30 Jan 2025 18:00) (76 - 140)  BP: 99/56 (30 Jan 2025 18:00) (99/56 - 153/76)  BP(mean): --  RR: 17 (30 Jan 2025 18:00) (17 - 18)  SpO2: 91% (30 Jan 2025 18:00) (74% - 99%)    Parameters below as of 30 Jan 2025 18:00  Patient On (Oxygen Delivery Method): nasal cannula  O2 Flow (L/min): 6         GENERAL:  [x ]Alert  [x ]Oriented x 3  [ ]Lethargic  [ ]Cachexia  [ ]Unarousable  [x ]Verbal  [ ]Non-Verbal  [x ] No Distress  Behavioral:   [ ] Anxiety  [ ] Delirium [ ] Agitation [x ] Calm  [ ] Other  HEENT:  [x ]Normal  [ ] Temporal Wasting  [ ]Dry mouth   [ ]ET Tube/Trach  [ ]Oral lesions  [ ] Mucositis  PULMONARY:   [ x]Clear [ ]Tachypnea  [ ]Audible excessive secretions   [ ]Rhonchi        [ ]Right [ ]Left [ ]Bilateral  [ ]Crackles        [ ]Right [ ]Left [ ]Bilateral  [ ]Wheezing     [ ]Right [ ]Left [ ]Bilateral  [ ]Diminished breath sounds [ ]right [ ]left [ ]bilateral  CARDIOVASCULAR:    [x ]Regular [ ]Irregular [ ]Tachy  [ ]Anam [ ]Murmur [ ]Other  GASTROINTESTINAL:  [x ]Soft  [ ]Distended   [ ]+BS  [x ]Non tender [ ]Tender  [ ]PEG [ ]OGT/ NGT  Last BM: 1/28  GENITOURINARY:  [x ]Normal [ ] Incontinent   [ ]Oliguria/Anuria   [ ]Fung  MUSCULOSKELETAL:   [ x]Normal   [ ]Weakness  [ ]Bed/Wheelchair bound [ ]Edema  [  ] amputation  [  ] contraction  NEUROLOGIC:   [x ]No focal deficits  [ ]Cognitive impairment  [ ]Dysphagia [ ]Dysarthria [ ]Paresis [ ]Other   SKIN: See Nursing Skin Assessment for further details  [ x]Normal    [ ]Rash  [ ]Pressure ulcer(s)       Present on admission [ ]y [ ]n   [  ]  Wound    [  ] hyperpigmentation    CRITICAL CARE:  [ ]Shock Present  [ ]Septic [ ]Cardiogenic [ ]Neurologic [ ]Hypovolemic  [ ]Vasopressors [ ]Inotropes  [ x]Respiratory failure present [ ]Mechanical Ventilation [ ]Non-invasive ventilatory support [ ]High-Flow [x] nasal canula   [x ]Acute  [ ]Chronic [x ]Hypoxic  [ ]Hypercarbic [ ]Other  [ ]Other organ failure     LABS:  reviewed                                     10.9   18.55 )-----------( 163      ( 30 Jan 2025 10:30 )             35.4       01-30    141  |  104  |  9   ----------------------------<  147[H]  3.9   |  23  |  0.41[L]    Ca    8.0[L]      30 Jan 2025 05:15  Phos  2.7     01-30  Mg     2.20     01-30    TPro  5.1[L]  /  Alb  2.9[L]  /  TBili  1.0  /  DBili  x   /  AST  23  /  ALT  19  /  AlkPhos  71  01-30        RADIOLOGY & ADDITIONAL STUDIES: reviewed   INTERPRETATION:  CLINICAL INFORMATION: Hypoxia    TIME OF EXAMINATION: January 30 at 9:54 AM    EXAM: Portable chest    FINDINGS:    No interval change in the multiple nodular opacities consistent with   metastases.  Chain sutures in the bilateral lungs from past thoracotomies.  The heart is not enlarged.  No effusions or pneumothorax.      Protein Calorie Malnutrition Present: [ ]mild [ ]moderate [ ]severe [ ]underweight [ ]morbid obesity  https://www.andeal.org/vault/7307/web/files/ONC/Table_Clinical%20Characteristics%20to%20Document%20Malnutrition-White%20JV%20et%20al%923546.pdf    Height (cm): 147.32 (11-04-24 @ 16:00), 144.8 (10-19-24 @ 14:12), 144.8 (03-10-24 @ 23:45)  Weight (kg): 40.8 (01-26-25 @ 09:21), 45.1 (11-19-24 @ 16:00), 45.4 (10-19-24 @ 14:12)  BMI (kg/m2): 18.8 (01-26-25 @ 09:21), 20.8 (11-19-24 @ 16:00), 20.9 (11-04-24 @ 16:00)    [ ]PPSV2 < or = 30%  [ ]significant weight loss [ ]poor nutritional intake [ ]anasarca   [ ]Artificial Nutrition    REFERRALS:   [ ]Chaplaincy  [ ]Hospice  [ ]Child Life  [ ]Social Work  [x ]Case management [ ]Holistic Therapy

## 2025-01-31 NOTE — CONSULT NOTE ADULT - NS ATTEST RISK PROBLEM GEN_ALL_CORE FT
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

## 2025-01-31 NOTE — CONSULT NOTE ADULT - ATTENDING COMMENTS
64 year old female with a history of lung neuroendocrine tumor with pulmonary, bone and brain metastases, s/p WBRT in 11/2024, most recently on everolimus, papillary thyroid carcinoma s/p partial thyroidectomy, HTN, HLD presenting with worsening SOB and chest pain. P Patient recently returned from a 2 month trip to the [facility]- returned 3 days ago. During the past 2 months she has had worsening fatigue and low appetite, as well as worsening balance/gait. She has had worsening pain especially in her L rib area and back. Recently trialed on Tarlatinib but pt unfortunately developed cytokine release syndrome, now s/p tocizumab and on dexamethasone 10mg and on IV zosyn. Course also c/b by AFRVR. Palliative on board, recd medications for ydspnea, and pt DNR/DNI, although not fully comfort care as family wants to get her through this episode to then go home for hospice. Pulm c/f mng recs.    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   - Agree with hospice and palliative care    Thank you for your consult. Please call back if you have further questions regarding the care of this patient.

## 2025-01-31 NOTE — CHART NOTE - NSCHARTNOTEFT_GEN_A_CORE
Follow up encounter with pt's dtr at bedside. Pt is s/p in-pt immunotherapy. Family aware that pt had complications from treatment and had rapid responses. Family are not ready for comfort care still maintaining hope that she can recover from her current clinical status.  Dtr is a nurse with [employer] and has medical understanding of her mother's condition.  Her mother was previously considering hospice before trying one last treatment. Dtr tearful but understands that her mother wanted to try one last time. Dtr shared that her mother was able to celebrate her bday and mario holiday in the [facility] with the entire family.  Empathic and active listening provided.

## 2025-01-31 NOTE — CONSULT NOTE ADULT - SUBJECTIVE AND OBJECTIVE BOX
CHIEF COMPLAINT: SOB    HPI:  64 year old female with a history of lung neuroendocrine tumor with pulmonary, bone and brain metastases, s/p WBRT in 2024, most recently on everolimus, papillary thyroid carcinoma s/p partial thyroidectomy, HTN, HLD presenting with worsening SOB and chest pain. P Patient recently returned from a 2 month trip to the [facility]- returned 3 days ago. During the past 2 months she has had worsening fatigue and low appetite, as well as worsening balance/gait. She has had worsening pain especially in her L rib area and back. Recently trialed on Tarlatinib but pt unfortunately developed cytokine release syndrome, now s/p tocizumab and on dexamethasone 10mg and on IV zosyn. Course also c/b by AFRVR. Palliative on board, recd medications for ydspnea, and pt DNR/DNI, although not fully comfort care as family wants to get her through this episode to then go home for hospice. Pulm c/f mng recs.      PAST MEDICAL & SURGICAL HISTORY:  Hypertension      Hyperlipidemia      Former smoker  1/2 ppd x 1yr      Thyroid cancer      Diabetes mellitus      Neuroendocrine neoplasm of lung      Cancer with pulmonary metastases      H/O  section      S/P left oophorectomy      H/O: hysterectomy      H/O fracture of leg  left      History of thyroid surgery  partial rt thyroid ca      History of lung surgery  09/2021 x2          FAMILY HISTORY:  FH: CAD (coronary artery disease) (Father)        SOCIAL HISTORY:  Smoking: x  Substance Use:x   EtOH Use:x       Allergies    No Known Allergies    Intolerances        HOME MEDICATIONS:    REVIEW OF SYSTEMS:  Constitutional: [x] negative [ ] fevers [ ] chills [ ] weight loss [ ] weight gain  HEENT: [x] negative [ ] dry eyes [ ] eye irritation [ ] postnasal drip [ ] nasal congestion  CV: [x] negative  [ ] chest pain [ ] orthopnea [ ] palpitations [ ] murmur  Resp: [x] negative [ ] cough [ ] shortness of breath [ ] dyspnea [ ] wheezing [ ] sputum [ ] hemoptysis  GI: [x] negative [ ] nausea [ ] vomiting [ ] diarrhea [ ] constipation [ ] abd pain [ ] dysphagia   : [x] negative [ ] dysuria [ ] nocturia [ ] hematuria [ ] increased urinary frequency  Musculoskeletal: [x] negative [ ] back pain [ ] myalgias [ ] arthralgias [ ] fracture  Skin: [x] negative [ ] rash [ ] itch  Neurological: [x] negative [ ] headache [ ] dizziness [ ] syncope [ ] weakness [ ] numbness  Psychiatric: [x] negative [ ] anxiety [ ] depression  Endocrine: [x] negative [ ] diabetes [ ] thyroid problem  Hematologic/Lymphatic: [x] negative [ ] anemia [ ] bleeding problem  Allergic/Immunologic: [x] negative [ ] itchy eyes [ ] nasal discharge [ ] hives [ ] angioedema  [x] All other systems negative  [ ] Unable to assess ROS because ________    OBJECTIVE:  ICU Vital Signs Last 24 Hrs  T(C): 36.3 (2025 12:49), Max: 37.6 (2025 20:04)  T(F): 97.4 (2025 12:49), Max: 99.6 (2025 20:04)  HR: 104 (2025 12:49) (76 - 140)  BP: 142/66 (2025 10:10) (99/56 - 163/73)  BP(mean): --  ABP: --  ABP(mean): --  RR: 22 (2025 14:47) (17 - 26)  SpO2: 95% (2025 14:47) (88% - 96%)    O2 Parameters below as of 2025 14:47  Patient On (Oxygen Delivery Method): nasal cannula, high flow  O2 Flow (L/min): 50  O2 Concentration (%): 100          01-30 @ 07:01  -  01-31 @ 07:00  --------------------------------------------------------  IN: 450 mL / OUT: 0 mL / NET: 450 mL      CAPILLARY BLOOD GLUCOSE      POCT Blood Glucose.: 166 mg/dL (2025 12:44)      PHYSICAL EXAM:  General: sleeping, hypopneic   Respiratory: crackles bilat  Cardiovascular: tachy  Abdomen: NTND  Neurological: AOx0, no gross deficits    HOSPITAL MEDICATIONS:    nystatin    Suspension 105249 Unit(s) Oral every 6 hours  piperacillin/tazobactam IVPB.. 3.375 Gram(s) IV Intermittent every 8 hours  trimethoprim  160 mG/sulfamethoxazole 800 mG 1 Tablet(s) Oral <User Schedule>    losartan 50 milliGRAM(s) Oral daily  metoprolol tartrate 25 milliGRAM(s) Oral every 8 hours    atorvastatin 10 milliGRAM(s) Oral at bedtime  dexAMETHasone  IVPB 10 milliGRAM(s) IV Intermittent every 6 hours  dextrose 50% Injectable 25 Gram(s) IV Push once  dextrose 50% Injectable 12.5 Gram(s) IV Push once  dextrose 50% Injectable 25 Gram(s) IV Push once  dextrose Oral Gel 15 Gram(s) Oral once PRN  glucagon  Injectable 1 milliGRAM(s) IntraMuscular once  insulin lispro (ADMELOG) corrective regimen sliding scale   SubCutaneous three times a day before meals  insulin lispro (ADMELOG) corrective regimen sliding scale   SubCutaneous at bedtime    levalbuterol Inhalation 0.63 milliGRAM(s) Inhalation every 6 hours  montelukast 10 milliGRAM(s) Oral daily    HYDROmorphone  Injectable 0.5 milliGRAM(s) IV Push every 4 hours PRN  HYDROmorphone  Injectable 0.2 milliGRAM(s) IV Push every 8 hours  LORazepam   Injectable 0.12 milliGRAM(s) IV Push every 6 hours PRN  melatonin 3 milliGRAM(s) Oral at bedtime PRN  metoclopramide Injectable 10 milliGRAM(s) IV Push every 8 hours PRN  mirtazapine 7.5 milliGRAM(s) Oral at bedtime    pantoprazole    Tablet 40 milliGRAM(s) Oral two times a day  polyethylene glycol 3350 17 Gram(s) Oral two times a day  senna 2 Tablet(s) Oral at bedtime        dextrose 5%. 1000 milliLiter(s) IV Continuous <Continuous>  dextrose 5%. 1000 milliLiter(s) IV Continuous <Continuous>  multivitamin 1 Tablet(s) Oral daily  sodium chloride 0.9%. 1000 milliLiter(s) IV Continuous <Continuous>    influenza  Vaccine (HIGH DOSE) 0.5 milliLiter(s) IntraMuscular once    chlorhexidine 2% Cloths 1 Application(s) Topical daily    naloxone Injectable 0.1 milliGRAM(s) IV Push every 3 minutes PRN      LABS:                        11.3   22.94 )-----------( 162      ( 2025 07:18 )             34.8     Hgb Trend: 11.3<--, 10.8<--, 10.9<--, 10.3<--, 10.5<--      142  |  109[H]  |  18  ----------------------------<  272[H]  4.8   |  20[L]  |  0.48[L]    Ca    8.5      2025 07:18  Phos  3.1       Mg     2.40         TPro  5.1[L]  /  Alb  2.7[L]  /  TBili  0.6  /  DBili  x   /  AST  34[H]  /  ALT  26  /  AlkPhos  89      Creatinine Trend: 0.48<--, 0.50<--, 0.41<--, 0.41<--, 0.37<--, 0.32<--  PT/INR - ( 2025 19:27 )   PT: 15.0 sec;   INR: 1.26 ratio         PTT - ( 2025 07:18 )  PTT:51.5 sec  Urinalysis Basic - ( 2025 07:18 )    Color: x / Appearance: x / SG: x / pH: x  Gluc: 272 mg/dL / Ketone: x  / Bili: x / Urobili: x   Blood: x / Protein: x / Nitrite: x   Leuk Esterase: x / RBC: x / WBC x   Sq Epi: x / Non Sq Epi: x / Bacteria: x        Venous Blood Gas:   @ 07:18  7.39/37/68/22/94.0  VBG Lactate: 2.8  Venous Blood Gas:   @ 18:52  7.38/45/34/27/58.5  VBG Lactate: 3.4  Venous Blood Gas:   @ 10:30  7.40/37/52/23/84.5  VBG Lactate: 5.2      MICROBIOLOGY:     RADIOLOGY:  [x] Reviewed and interpreted by me    PULMONARY FUNCTION TESTS:    EKG:

## 2025-01-31 NOTE — CONSULT NOTE ADULT - ASSESSMENT
64 year old female with a history of lung neuroendocrine tumor with pulmonary, bone and brain metastases, s/p WBRT in 11/2024, most recently on everolimus, papillary thyroid carcinoma s/p partial thyroidectomy, HTN, HLD presenting with worsening SOB and chest pain. P Patient recently returned from a 2 month trip to the [facility]- returned 3 days ago. During the past 2 months she has had worsening fatigue and low appetite, as well as worsening balance/gait. She has had worsening pain especially in her L rib area and back. Recently trialed on Tarlatinib but pt unfortunately developed cytokine release syndrome, now s/p tocizumab and on dexamethasone 10mg and on IV zosyn. Course also c/b by AFRVR. Palliative on board, recd medications for ydspnea, and pt DNR/DNI, although not fully comfort care as family wants to get her through this episode to then go home for hospice. Pulm c/f mng recs.    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 Mets to lung is unfortunately extensive. Would recd airway clearance: humidified oxygen, standing guaifenesin 1200mg bid, duonebs q6h  -Dyspnea and cough tx per palliative  - Zosyn per primary  - team and family in agreement that home hospice, if able to reach, is the goal of this current hospitalization    Pulmonary to sign-off at this time. Thank you for this consult. Please call or Teams with any questions.     Robbie Bryant MD  Fellow, Pulmonary-Critical Care

## 2025-01-31 NOTE — PROGRESS NOTE ADULT - PROBLEM SELECTOR PLAN 1
Worsening metastatic disease noted on CT imaging. Most recently was on everolimus- patient's daughter reports patient is no longer on any kind of treatment. Plan was to follow up with Dr. Marx outpatient to discuss other treatment options once patient returned to the [place]  - s/p Tarlatamab on 1/29 complicated by fever with acute hypoxic resp failure c/w CRS  - s/p Tocilizumab x3 doses. IV solumedrol given. Will continue steroids  - Currently on 100% FIO2 HFNC  - Pulm consulted- continue standing nebs, Guaifensin  - Pall care following. Pt DNR/DNI. Goal is for patient to be able to go home with hospice if she improves Worsening metastatic disease noted on CT imaging. Most recently was on everolimus- patient's daughter reports patient is no longer on any kind of treatment. Plan was to follow up with Dr. Marx outpatient to discuss other treatment options once patient returned to the [place]  - s/p Tarlatamab on 1/29 complicated by fever with acute hypoxic resp failure c/w CRS  - s/p Tocilizumab x3 doses. IV solumedrol given. Will continue steroids  - Currently on 100% FIO2 HFNC  - Pulm consulted- continue standing nebs, Guaifensin  - Pall care following. Pt DNR/DNI. Per family, hoping to take patient home with hospice if she improves. At this time prognosis is very poor, not stable enough for home hospice. Will continue treatment for CRS

## 2025-01-31 NOTE — PROGRESS NOTE ADULT - PROBLEM SELECTOR PLAN 4
- Pt had unwitnessed fall in her room. Denies any LOC or head trauma  - CTH neg  - No focal neurological findings  - Pt denies any pain. Has some chronic back pain, unchanged  - Fall likely secondary to generalized weakness/deconditioning. Low suspicion for CNS or cardiac cause

## 2025-01-31 NOTE — PROGRESS NOTE ADULT - PROBLEM SELECTOR PLAN 1
- Patient with lung neuroendocrine tumor with pulmonary, bone and brain metastases, s/p WBRT in 11/2024, most recently on everolimus.   - CT 1/26 - Findings suggestive of bilateral pulmonary metastases, hepatic   metastases, right adrenal gland metastasis as well as metastatic implants   within the subcutaneous tissues of the anterior chest wall/both breasts.  Mediastinal/bilateral hilar adenopathy.  - Follows with Francisco J  - Patient is s/p Tarlatamab on 1/29 during this admission complicated by cytokine release syndrome s/p Toci

## 2025-01-31 NOTE — PROGRESS NOTE ADULT - ASSESSMENT
64 year old female with a history of lung neuroendocrine tumor with pulmonary, bone and brain metastases, s/p WBRT in 11/2024, most recently on everolimus, papillary thyroid carcinoma s/p partial thyroidectomy, HTN, and HLD presenting with worsening SOB and chest pain, likely due to worsening metastatic disease. Course complicated by cytokine release syndrome and new onset atrial fibrillation. Cardiology consulted for new onset atrial fibrillation.    Converted from afib to sinus with frequent PACs overnight. RRT called this morning for respiratory distress which resolved. Would change metoprolol tartrate to metoprolol succinate. Start AC when ok from heme/onc perspective.    Problems Assessed:  1. new paroxysmal Atrial Fibrillation w RVR  2. HTN  3. HLD    Plan:  ·	switch metop tartrate to Metoprolol Succinate 75 mg qD  ·	start heparin in context of Afib if felt to be low bleeding risk, otherwise can continue to hold   ·	cw lipitor  ·	cw losartan  ·	Telemetry    ·	obtain TTE

## 2025-01-31 NOTE — PROGRESS NOTE ADULT - CONVERSATION DETAILS
Daughter Rachel aware that pt had complications from treatment and had rapid responses last night and this morning. Daughter is NOT ready for comfort care as family still maintaining hope that she can recover from her current clinical status.     Daughter tearful as she shares that her mother was previously considering hospice before but opted trying one last treatment. Dtr shared that her mother was able to celebrate her bday and christmas holiday in the [facility] with the entire family.    Empathic and active listening provided.

## 2025-01-31 NOTE — PROGRESS NOTE ADULT - SUBJECTIVE AND OBJECTIVE BOX
Cardiology Progress Note  ------------------------------------------------------------------------------------------  SUBJECTIVE:   - Tele: 110-120s w frequent PACs  - currently in RRT     -------------------------------------------------------------------------------------------  VS:  T(F): 97.4 (01-31), Max: 99.6 (01-30)  HR: 104 (01-31) (76 - 140)  BP: 142/66 (01-31) (99/56 - 163/73)  RR: 22 (01-31)  SpO2: 95% (01-31)  I&O's Summary    30 Jan 2025 07:01  -  31 Jan 2025 07:00  --------------------------------------------------------  IN: 450 mL / OUT: 0 mL / NET: 450 mL      PHYSICAL EXAM:  GENERAL: acute respiratory distress  NECK: Supple, No JVD.  HEART: IRR; No murmurs, rubs, or gallops.  ABDOMEN: Soft. Nondistended.   EXTREMITIES: No edema. No clubbing or cyanosis.     -------------------------------------------------------------------------------------------  LABS:                          11.3   22.94 )-----------( 162      ( 31 Jan 2025 07:18 )             34.8     01-31    142  |  109[H]  |  18  ----------------------------<  272[H]  4.8   |  20[L]  |  0.48[L]    Ca    8.5      31 Jan 2025 07:18  Phos  3.1     01-31  Mg     2.40     01-31    TPro  5.1[L]  /  Alb  2.7[L]  /  TBili  0.6  /  DBili  x   /  AST  34[H]  /  ALT  26  /  AlkPhos  89  01-31    PT/INR - ( 30 Jan 2025 19:27 )   PT: 15.0 sec;   INR: 1.26 ratio         PTT - ( 31 Jan 2025 07:18 )  PTT:51.5 sec  CARDIAC MARKERS ( 30 Jan 2025 18:52 )  25 ng/L / x     / x     / x     / x     / x      CARDIAC MARKERS ( 26 Jan 2025 14:00 )  22 ng/L / x     / x     / x     / x     / x      CARDIAC MARKERS ( 26 Jan 2025 10:48 )  25 ng/L / x     / x     / x     / x     / x                -------------------------------------------------------------------------------------------  Meds:  atorvastatin 10 milliGRAM(s) Oral at bedtime  chlorhexidine 2% Cloths 1 Application(s) Topical daily  dexAMETHasone  IVPB 10 milliGRAM(s) IV Intermittent every 6 hours  dextrose 5%. 1000 milliLiter(s) IV Continuous <Continuous>  dextrose 5%. 1000 milliLiter(s) IV Continuous <Continuous>  dextrose 50% Injectable 25 Gram(s) IV Push once  dextrose 50% Injectable 12.5 Gram(s) IV Push once  dextrose 50% Injectable 25 Gram(s) IV Push once  dextrose Oral Gel 15 Gram(s) Oral once PRN  glucagon  Injectable 1 milliGRAM(s) IntraMuscular once  HYDROmorphone  Injectable 0.5 milliGRAM(s) IV Push every 4 hours PRN  HYDROmorphone  Injectable 0.2 milliGRAM(s) IV Push every 8 hours  influenza  Vaccine (HIGH DOSE) 0.5 milliLiter(s) IntraMuscular once  insulin lispro (ADMELOG) corrective regimen sliding scale   SubCutaneous three times a day before meals  insulin lispro (ADMELOG) corrective regimen sliding scale   SubCutaneous at bedtime  levalbuterol Inhalation 0.63 milliGRAM(s) Inhalation every 6 hours  LORazepam   Injectable 0.12 milliGRAM(s) IV Push every 6 hours PRN  losartan 50 milliGRAM(s) Oral daily  melatonin 3 milliGRAM(s) Oral at bedtime PRN  metoclopramide Injectable 10 milliGRAM(s) IV Push every 8 hours PRN  metoprolol tartrate 25 milliGRAM(s) Oral every 8 hours  mirtazapine 7.5 milliGRAM(s) Oral at bedtime  montelukast 10 milliGRAM(s) Oral daily  multivitamin 1 Tablet(s) Oral daily  naloxone Injectable 0.1 milliGRAM(s) IV Push every 3 minutes PRN  nystatin    Suspension 515258 Unit(s) Oral every 6 hours  pantoprazole    Tablet 40 milliGRAM(s) Oral two times a day  piperacillin/tazobactam IVPB.- 3.375 Gram(s) IV Intermittent once  piperacillin/tazobactam IVPB.. 3.375 Gram(s) IV Intermittent every 8 hours  polyethylene glycol 3350 17 Gram(s) Oral two times a day  senna 2 Tablet(s) Oral at bedtime  sodium chloride 0.9%. 1000 milliLiter(s) IV Continuous <Continuous>  trimethoprim  160 mG/sulfamethoxazole 800 mG 1 Tablet(s) Oral <User Schedule>    -------------------------------------------------------------------------------------------  Cardiovascular Diagnostic Testing:    ECG:  Ventricular Rate 79 BPM    Atrial Rate 79 BPM    P-R Interval 140 ms    QRS Duration 68 ms    Q-T Interval 382 ms    QTC Calculation(Bazequita) 438 ms    P Axis 47 degrees    R Axis -3 degrees    T Axis 39 degrees    Diagnosis Line Normal sinus rhythm  Normal ECG       -------------------------------------------------------------------------------------------   Cardiology Progress Note  ------------------------------------------------------------------------------------------  SUBJECTIVE:   - Tele: 110-120s w frequent PACs  - currently in RRT     ------------------------------------------------------------------------------------------  VS:  T(F): 97.4 (01-31), Max: 99.6 (01-30)  HR: 104 (01-31) (76 - 140)  BP: 142/66 (01-31) (99/56 - 163/73)  RR: 22 (01-31)  SpO2: 95% (01-31)  I&O's Summary    30 Jan 2025 07:01  -  31 Jan 2025 07:00  --------------------------------------------------------  IN: 450 mL / OUT: 0 mL / NET: 450 mL      PHYSICAL EXAM:  GENERAL: acute respiratory distress  NECK: Supple, No JVD.  HEART: IRR; No murmurs, rubs, or gallops.  ABDOMEN: Soft. Nondistended.   EXTREMITIES: No edema. No clubbing or cyanosis.     -------------------------------------------------------------------------------------------  LABS:                          11.3   22.94 )-----------( 162      ( 31 Jan 2025 07:18 )             34.8     01-31    142  |  109[H]  |  18  ----------------------------<  272[H]  4.8   |  20[L]  |  0.48[L]    Ca    8.5      31 Jan 2025 07:18  Phos  3.1     01-31  Mg     2.40     01-31    TPro  5.1[L]  /  Alb  2.7[L]  /  TBili  0.6  /  DBili  x   /  AST  34[H]  /  ALT  26  /  AlkPhos  89  01-31    PT/INR - ( 30 Jan 2025 19:27 )   PT: 15.0 sec;   INR: 1.26 ratio         PTT - ( 31 Jan 2025 07:18 )  PTT:51.5 sec  CARDIAC MARKERS ( 30 Jan 2025 18:52 )  25 ng/L / x     / x     / x     / x     / x      CARDIAC MARKERS ( 26 Jan 2025 14:00 )  22 ng/L / x     / x     / x     / x     / x      CARDIAC MARKERS ( 26 Jan 2025 10:48 )  25 ng/L / x     / x     / x     / x     / x                -------------------------------------------------------------------------------------------  Meds:  atorvastatin 10 milliGRAM(s) Oral at bedtime  chlorhexidine 2% Cloths 1 Application(s) Topical daily  dexAMETHasone  IVPB 10 milliGRAM(s) IV Intermittent every 6 hours  dextrose 5%. 1000 milliLiter(s) IV Continuous <Continuous>  dextrose 5%. 1000 milliLiter(s) IV Continuous <Continuous>  dextrose 50% Injectable 25 Gram(s) IV Push once  dextrose 50% Injectable 12.5 Gram(s) IV Push once  dextrose 50% Injectable 25 Gram(s) IV Push once  dextrose Oral Gel 15 Gram(s) Oral once PRN  glucagon  Injectable 1 milliGRAM(s) IntraMuscular once  HYDROmorphone  Injectable 0.5 milliGRAM(s) IV Push every 4 hours PRN  HYDROmorphone  Injectable 0.2 milliGRAM(s) IV Push every 8 hours  influenza  Vaccine (HIGH DOSE) 0.5 milliLiter(s) IntraMuscular once  insulin lispro (ADMELOG) corrective regimen sliding scale   SubCutaneous three times a day before meals  insulin lispro (ADMELOG) corrective regimen sliding scale   SubCutaneous at bedtime  levalbuterol Inhalation 0.63 milliGRAM(s) Inhalation every 6 hours  LORazepam   Injectable 0.12 milliGRAM(s) IV Push every 6 hours PRN  losartan 50 milliGRAM(s) Oral daily  melatonin 3 milliGRAM(s) Oral at bedtime PRN  metoclopramide Injectable 10 milliGRAM(s) IV Push every 8 hours PRN  metoprolol tartrate 25 milliGRAM(s) Oral every 8 hours  mirtazapine 7.5 milliGRAM(s) Oral at bedtime  montelukast 10 milliGRAM(s) Oral daily  multivitamin 1 Tablet(s) Oral daily  naloxone Injectable 0.1 milliGRAM(s) IV Push every 3 minutes PRN  nystatin    Suspension 352285 Unit(s) Oral every 6 hours  pantoprazole    Tablet 40 milliGRAM(s) Oral two times a day  piperacillin/tazobactam IVPB.- 3.375 Gram(s) IV Intermittent once  piperacillin/tazobactam IVPB.. 3.375 Gram(s) IV Intermittent every 8 hours  polyethylene glycol 3350 17 Gram(s) Oral two times a day  senna 2 Tablet(s) Oral at bedtime  sodium chloride 0.9%. 1000 milliLiter(s) IV Continuous <Continuous>  trimethoprim  160 mG/sulfamethoxazole 800 mG 1 Tablet(s) Oral <User Schedule>    -------------------------------------------------------------------------------------------  Cardiovascular Diagnostic Testing:    ECG:  Ventricular Rate 79 BPM    Atrial Rate 79 BPM    P-R Interval 140 ms    QRS Duration 68 ms    Q-T Interval 382 ms    QTC Calculation(Bazequita) 438 ms    P Axis 47 degrees    R Axis -3 degrees    T Axis 39 degrees    Diagnosis Line Normal sinus rhythm  Normal ECG       -------------------------------------------------------------------------------------------

## 2025-01-31 NOTE — RAPID RESPONSE TEAM SUMMARY - NSSITUATIONBACKGROUNDRRT_GEN_ALL_CORE
64 year old female with a history of lung neuroendocrine tumor with pulmonary, bone and brain metastases, s/p WBRT in 11/2024, most recently on everolimus, papillary thyroid carcinoma s/p partial thyroidectomy, HTN, HLD presenting with worsening SOB and chest pain, likely due to worsening metastatic disease. RRT called for increased work of breathing.

## 2025-01-31 NOTE — PROGRESS NOTE ADULT - ATTENDING COMMENTS
agree with fellow assessment.   1. Atrial fibrillation with RVR- improved.   2. Acute hypoxic respiratory failure- improved.   3. Metastatic Lung cancer     Currently respiratory status improved on NIV, patient is DNR/DNI.   Echocardiogram pending   Atrial fibrillation is rate controlled with current dose of metoprolol agree with fellow assessment.   1. Atrial fibrillation with RVR- improved.   2. Acute hypoxic respiratory failure- improved.   3. Metastatic Lung cancer     Currently respiratory status improved on NIV, patient is DNR/DNI.   Echocardiogram pending   Atrial fibrillation is rate controlled with current dose of metoprolol  Started on anticoagulation for elevated [id number], monitor closely for bleeding. Trend Hgb.

## 2025-01-31 NOTE — PROGRESS NOTE ADULT - SUBJECTIVE AND OBJECTIVE BOX
Date of Service  : 1/31/2025  Indication for Geriatrics and Palliative Care Services: goals of care     INTERVAL HPI/OVERNIGHT EVENTS:  Patient with rapid response overnight and early this AM due to hypoxia. Patient now in HFNC  In past 24 hours, received 1 prn doses of PO Oxycodone IR 10mg and 1 dose of Reglan    SUBJECTIVE AND OBJECTIVE:  Patient seen and examined with daughter at bedside. Patient resting during encounter with mild tachypnea.     Allergies    No Known Allergies    Intolerances    MEDICATIONS  (STANDING):  atorvastatin 10 milliGRAM(s) Oral at bedtime  chlorhexidine 2% Cloths 1 Application(s) Topical daily  dextrose 5%. 1000 milliLiter(s) (50 mL/Hr) IV Continuous <Continuous>  dextrose 5%. 1000 milliLiter(s) (100 mL/Hr) IV Continuous <Continuous>  dextrose 50% Injectable 25 Gram(s) IV Push once  dextrose 50% Injectable 12.5 Gram(s) IV Push once  dextrose 50% Injectable 25 Gram(s) IV Push once  glucagon  Injectable 1 milliGRAM(s) IntraMuscular once  HYDROmorphone  Injectable 0.2 milliGRAM(s) IV Push every 8 hours  influenza  Vaccine (HIGH DOSE) 0.5 milliLiter(s) IntraMuscular once  insulin lispro (ADMELOG) corrective regimen sliding scale   SubCutaneous three times a day before meals  insulin lispro (ADMELOG) corrective regimen sliding scale   SubCutaneous at bedtime  levalbuterol Inhalation 0.63 milliGRAM(s) Inhalation every 6 hours  losartan 50 milliGRAM(s) Oral daily  metoprolol tartrate 25 milliGRAM(s) Oral every 8 hours  mirtazapine 7.5 milliGRAM(s) Oral at bedtime  montelukast 10 milliGRAM(s) Oral daily  multivitamin 1 Tablet(s) Oral daily  nystatin    Suspension 615198 Unit(s) Oral every 6 hours  pantoprazole    Tablet 40 milliGRAM(s) Oral two times a day  piperacillin/tazobactam IVPB.. 3.375 Gram(s) IV Intermittent every 8 hours  polyethylene glycol 3350 17 Gram(s) Oral two times a day  senna 2 Tablet(s) Oral at bedtime  sodium chloride 0.9%. 1000 milliLiter(s) (100 mL/Hr) IV Continuous <Continuous>  trimethoprim  160 mG/sulfamethoxazole 800 mG 1 Tablet(s) Oral <User Schedule>    MEDICATIONS  (PRN):  dextrose Oral Gel 15 Gram(s) Oral once PRN Blood Glucose LESS THAN 70 milliGRAM(s)/deciliter  HYDROmorphone  Injectable 0.5 milliGRAM(s) IV Push every 4 hours PRN Severe Pain (7 - 10)  LORazepam   Injectable 0.12 milliGRAM(s) IV Push every 6 hours PRN Anxiety  melatonin 3 milliGRAM(s) Oral at bedtime PRN Insomnia  metoclopramide Injectable 10 milliGRAM(s) IV Push every 8 hours PRN Nausea/vomiting  naloxone Injectable 0.1 milliGRAM(s) IV Push every 3 minutes PRN For ANY of the following changes in patient status:  A. RR LESS THAN 10 breaths per minute, B. Oxygen saturation LESS THAN 90%, C. Sedation score of 6      ITEMS UNCHECKED ARE NOT PRESENT    PRESENT SYMPTOMS: [x ]Unable to self-report due to clinical status- see [ ] CPOT [ x] PAINADS [ x] RDOS  Source if other than patient:  [ ]Family   [ ]Team     Pain: [ ]yes [ ]no  QOL impact -   Location -     Aggravating factors -  Quality -   Radiation -   Timing-   Severity (0-10 scale):   Minimal acceptable level / Pain goal (0-10 scale):     Dyspnea:                           [ ]Mild [ x]Moderate [ ]Severe  Anxiety:                             [ ]Mild [ ]Moderate [ ]Severe  Agitation:                          [ ]Mild [ ]Moderate [ ]Severe  Fatigue:                             [ ]Mild [ ]Moderate [ ]Severe  Nausea:                             [ ]Mild [ ]Moderate [ ]Severe  Loss of appetite:              [ ]Mild [ x]Moderate [ ]Severe  Constipation:                   [ ]Mild [ ]Moderate [ ]Severe  Diarrhea:                          [ ]Mild [ ]Moderate [ ]Severe      CPOT:    https://www.[facility].org/getattachment/[email or web address]/Critical-Care-Pain-Observation-Tool-(CPOT)    PCSSQ[Palliative Care Spiritual Screening Question]   Severity (0-10):  Score of 4 or > indicate consideration of Chaplaincy referral.  Chaplaincy Referral: [ ] yes [ ] refused [x ] following [ ] deferred    Caregiver [place]? : [ ] yes [ ] no [ ] Declined [x ] Deferred              Social work referral [ ] Patient & Family Centered Care Referral [ ]     Anticipatory Grief present?:  [x ] yes [ ] no  [ ] Deferred                  Social work referral [x ] Chaplaincy Referral[x ]    Other Symptoms:  [ ]All other review of systems negative   [ x] Unable to obtain due to poor mentation     PHYSICAL EXAM:  Vital Signs Last 24 Hrs  T(C): 36.3 (31 Jan 2025 12:49), Max: 37 (31 Jan 2025 10:25)  T(F): 97.4 (31 Jan 2025 12:49), Max: 98.6 (31 Jan 2025 10:25)  HR: 102 (31 Jan 2025 16:50) (76 - 127)  BP: 142/66 (31 Jan 2025 10:10) (117/70 - 163/73)  BP(mean): --  RR: 22 (31 Jan 2025 14:47) (18 - 26)  SpO2: 98% (31 Jan 2025 16:50) (88% - 98%)    Parameters below as of 31 Jan 2025 16:50  Patient On (Oxygen Delivery Method): nasal cannula, high flow           GENERAL: Resting   [ ]Alert  [ ]Oriented   [ ]Lethargic  [ ]Cachexia  [ ]Unarousable    Behavioral:   [ ] Anxiety  [ ] Delirium [ ] Agitation [ ] Calm  [ ] Other  HEENT:  [x ]Normal  [ ] Temporal Wasting  [ ]Dry mouth   [ ]ET Tube/Trach  [ ]Oral lesions  [ ] Mucositis  PULMONARY: mild accessory muscle use   [ ]Clear [x ]Tachypnea  [ ]Audible excessive secretions   [ ]Rhonchi        [ ]Right [ ]Left [ ]Bilateral  [ ]Crackles        [ ]Right [ ]Left [ ]Bilateral  [ ]Wheezing     [ ]Right [ ]Left [ ]Bilateral  [ ]Diminished breath sounds [ ]right [ ]left [ ]bilateral  CARDIOVASCULAR:    [ ]Regular [ ]Irregular [x ]Tachy  [ ]Anam [ ]Murmur [ ]Other  GASTROINTESTINAL:  [x ]Soft  [ ]Distended   [ ]+BS  [x ]Non tender [ ]Tender  [ ]PEG [ ]OGT/ NGT  Last BM: 1/28  GENITOURINARY:  [x ]Normal [ ] Incontinent   [ ]Oliguria/Anuria   [ ]Fung  MUSCULOSKELETAL:   [ ]Normal   [ x]Weakness  [x ]Bed/Wheelchair bound [ ]Edema  [  ] amputation  [  ] contraction  NEUROLOGIC:   [x ]No focal deficits  [ ]Cognitive impairment  [ ]Dysphagia [ ]Dysarthria [ ]Paresis [ ]Other   SKIN: See Nursing Skin Assessment for further details  [ x]Normal    [ ]Rash  [ ]Pressure ulcer(s)       Present on admission [ ]y [ ]n   [  ]  Wound    [  ] hyperpigmentation    CRITICAL CARE:  [ ]Shock Present  [ ]Septic [ ]Cardiogenic [ ]Neurologic [ ]Hypovolemic  [ ]Vasopressors [ ]Inotropes  [ x]Respiratory failure present [ ]Mechanical Ventilation [ ]Non-invasive ventilatory support [x ]High-Flow   [x ]Acute  [ ]Chronic [x ]Hypoxic  [ ]Hypercarbic [ ]Other  [ ]Other organ failure     LABS:  reviewed                                           11.3   22.94 )-----------( 162      ( 31 Jan 2025 07:18 )             34.8       01-31    142  |  109[H]  |  18  ----------------------------<  272[H]  4.8   |  20[L]  |  0.48[L]    Ca    8.5      31 Jan 2025 07:18  Phos  3.1     01-31  Mg     2.40     01-31    TPro  5.1[L]  /  Alb  2.7[L]  /  TBili  0.6  /  DBili  x   /  AST  34[H]  /  ALT  26  /  AlkPhos  89  01-31      PT/INR - ( 30 Jan 2025 19:27 )   PT: 15.0 sec;   INR: 1.26 ratio         PTT - ( 31 Jan 2025 07:18 )  PTT:51.5 sec    RADIOLOGY & ADDITIONAL STUDIES: reviewed   INTERPRETATION:  CLINICAL INFORMATION: Hypoxia    TIME OF EXAMINATION: January 30 at 9:54 AM    EXAM: Portable chest    FINDINGS:    No interval change in the multiple nodular opacities consistent with   metastases.  Chain sutures in the bilateral lungs from past thoracotomies.  The heart is not enlarged.  No effusions or pneumothorax.      Protein Calorie Malnutrition Present: [ ]mild [ ]moderate [ x]severe [ ]underweight [ ]morbid obesity  https://www.andeal.org/vault/2440/web/files/ONC/Table_Clinical%20Characteristics%20to%20Document%20Malnutrition-White%20JV%20et%20al%202012.pdf    Height (cm): 147.32 (11-04-24 @ 16:00), 144.8 (10-19-24 @ 14:12), 144.8 (03-10-24 @ 23:45)  Weight (kg): 40.8 (01-26-25 @ 09:21), 45.1 (11-19-24 @ 16:00), 45.4 (10-19-24 @ 14:12)  BMI (kg/m2): 18.8 (01-26-25 @ 09:21), 20.8 (11-19-24 @ 16:00), 20.9 (11-04-24 @ 16:00)    [x ]PPSV2 < or = 30%  [ ]significant weight loss [ ]poor nutritional intake [ ]anasarca   [ ]Artificial Nutrition    REFERRALS:   [ ]Chaplaincy  [ ]Hospice  [ ]Child Life  [ ]Social Work  [x ]Case management [ ]Holistic Therapy

## 2025-01-31 NOTE — PROGRESS NOTE ADULT - NS ATTEST RISK PROBLEM GEN_ALL_CORE FT
1. Number and complexity of problems addressed for this patient:    1.1 Moderate (At least 1)  [ ] 1 or more chronic illnesses with exacerbation, progression, or side effects of treatment  [ ] 2 or more stable chronic illnesses  [ ] 1 undiagnosed new problem with uncertain prognosis  [ ] 1 acute illness with systemic symptoms  [ ] 1 acute complicated injury  1.2 High (At least 1)   [ x] 1 or more chronic illnesses with severe exacerbation, progression, or side effects of treatment  [x ] 1 acute or chronic illnesses or injuries that may pose a threat to life or bodily function    2. Amount and/or Complexity of Data that was Reviewed and Analyzed for this case:       Moderate (1 out of 3)       High (2 out of 3)  2.1. (Any combination of 3 of the following)   [x ] Prior External notes were reviewed  [ x] Each test result was reviewed (see "LABS" and "RADIOLOGY & ADDITIONAL STUDIES" above)  [ ] The following tests were ordered and/or reviewed (Only count 1 point for ordering or reviewing a unique test):  	[ ]CBC  	[ ] Chemistry   	[ ] Imaging   	[ ] Other:   [x ] Assessment requiring an independent historian   		Name of historian and relationship: Bedside RN   2.2  [ ] Personally review and interpretation of  image or testing   2.3  [x ] Discussion of management or test interpretation with external physician/other qualified health care professional\appropriate source (not separately reported)    3. Risk of Complications and/or Morbidity or Mortality of for this Patient’s Management:  3.1 Moderate risk of morbidity from additional diagnostic testing or treatment (At least 1):   [ ] Prescription drug management  [ ] Decision regarding minor surgery, treatment, or procedure with identified patient or procedure risk factors  [ ] Decision regarding elective major surgery, treatment, or procedure without identified patient or procedure risk factors   [ ] Diagnosis or treatment significantly limited by social determinants of health   [ ] Other:   3.2 High risk of morbidity from additional diagnostic testing or treatment (At least 1):   [ ] Drug therapy requiring intensive monitoring for toxicity   [ ] Decision regarding elective major surgery, treatment, or procedure with identified patient or procedure risk factors   [ ] Decision regarding emergency major surgery, treatment, or procedure   [ ] Decision regarding hospitalization or escalation of hospital-level of care  [ ] Decision not to resuscitate, not to intubate, or to de-escalate care because of poor prognosis   [ ] Decision to proceed or not with artificial nutrition   [x ] Parenteral controlled substance  [ ] Other:

## 2025-01-31 NOTE — PROGRESS NOTE ADULT - PROBLEM SELECTOR PLAN 3
- New onset A fib, likely triggered by CRS   - Cardiology consulted. Rec metoprolol.  Continue Losartan   - However, pt may not be able to tolerate PO. If needed, will given IV metoprolol prn   - Monitor on tele   - May consider bedside TTE if still within GOC. However, this likely would not  at this time

## 2025-01-31 NOTE — PROGRESS NOTE ADULT - PROBLEM SELECTOR PLAN 3
- Home regimen: Oxy ER 20mg BID, Oxy IR 10mg PRN   - 1/31 - Discussed with daughter at bedside due to current clinical status may be unable tolerate PO and would recommend switching OxyCONTIN to IV Dilaudid ATC with Oxy IR to IV Dilaudid PRN .  Daughter in agreement as long as morphine/ dilaudid gtt was not started at this time as family remain hopeful patient may improve and not ready for comfort measures yet  - Discontinue PO OxyCONTIN (Oxy ER) 20mg q12 and PO Oxycodone IR 10mg PRN   - Start IV Dilaudid 0.2mg q8 ATC (hold for hypotension, oversedation, respiratory depression); patient / family may refuse doses  - Start IV Dilaudid 0.5mg q4 PRN (hold for hypotension, oversedation, respiratory depression)  - Bowel regimen while on opiates.

## 2025-01-31 NOTE — PROGRESS NOTE ADULT - SUBJECTIVE AND OBJECTIVE BOX
Patient is a 64y old  Female who presents with a chief complaint of Shortness of breath and chest pain (31 Jan 2025 16:25)      SUBJECTIVE / OVERNIGHT EVENTS: RRT called this AM for hypoxia and increased WOB, likely secondary to CRS. Third dose Toci given, IV Solumedrol also given. O2 increased to 100% FIO2 on HFNC. Daughter at bedside. Pt DNR/DNI    ADDITIONAL REVIEW OF SYSTEMS:    MEDICATIONS  (STANDING):  atorvastatin 10 milliGRAM(s) Oral at bedtime  chlorhexidine 2% Cloths 1 Application(s) Topical daily  dexAMETHasone  IVPB 10 milliGRAM(s) IV Intermittent every 6 hours  dextrose 5%. 1000 milliLiter(s) (50 mL/Hr) IV Continuous <Continuous>  dextrose 5%. 1000 milliLiter(s) (100 mL/Hr) IV Continuous <Continuous>  dextrose 50% Injectable 25 Gram(s) IV Push once  dextrose 50% Injectable 12.5 Gram(s) IV Push once  dextrose 50% Injectable 25 Gram(s) IV Push once  glucagon  Injectable 1 milliGRAM(s) IntraMuscular once  HYDROmorphone  Injectable 0.2 milliGRAM(s) IV Push every 8 hours  influenza  Vaccine (HIGH DOSE) 0.5 milliLiter(s) IntraMuscular once  insulin lispro (ADMELOG) corrective regimen sliding scale   SubCutaneous three times a day before meals  insulin lispro (ADMELOG) corrective regimen sliding scale   SubCutaneous at bedtime  levalbuterol Inhalation 0.63 milliGRAM(s) Inhalation every 6 hours  losartan 50 milliGRAM(s) Oral daily  metoprolol tartrate 25 milliGRAM(s) Oral every 8 hours  mirtazapine 7.5 milliGRAM(s) Oral at bedtime  montelukast 10 milliGRAM(s) Oral daily  multivitamin 1 Tablet(s) Oral daily  nystatin    Suspension [id number] Unit(s) Oral every 6 hours  pantoprazole    Tablet 40 milliGRAM(s) Oral two times a day  piperacillin/tazobactam IVPB.. 3.375 Gram(s) IV Intermittent every 8 hours  polyethylene glycol 3350 17 Gram(s) Oral two times a day  senna 2 Tablet(s) Oral at bedtime  sodium chloride 0.9%. 1000 milliLiter(s) (100 mL/Hr) IV Continuous <Continuous>  trimethoprim  160 mG/sulfamethoxazole 800 mG 1 Tablet(s) Oral <User Schedule>    MEDICATIONS  (PRN):  dextrose Oral Gel 15 Gram(s) Oral once PRN Blood Glucose LESS THAN 70 milliGRAM(s)/deciliter  HYDROmorphone  Injectable 0.5 milliGRAM(s) IV Push every 4 hours PRN Severe Pain (7 - 10)  LORazepam   Injectable 0.12 milliGRAM(s) IV Push every 6 hours PRN Anxiety  melatonin 3 milliGRAM(s) Oral at bedtime PRN Insomnia  metoclopramide Injectable 10 milliGRAM(s) IV Push every 8 hours PRN Nausea/vomiting  naloxone Injectable 0.1 milliGRAM(s) IV Push every 3 minutes PRN For ANY of the following changes in patient status:  A. RR LESS THAN 10 breaths per minute, B. Oxygen saturation LESS THAN 90%, C. Sedation score of 6      CAPILLARY BLOOD GLUCOSE      POCT Blood Glucose.: 166 mg/dL (31 Jan 2025 12:44)  POCT Blood Glucose.: 328 mg/dL (31 Jan 2025 09:14)  POCT Blood Glucose.: 254 mg/dL (31 Jan 2025 08:37)  POCT Blood Glucose.: 166 mg/dL (30 Jan 2025 22:20)  POCT Blood Glucose.: 215 mg/dL (30 Jan 2025 19:46)  POCT Blood Glucose.: 408 mg/dL (30 Jan 2025 19:44)  POCT Blood Glucose.: 195 mg/dL (30 Jan 2025 17:33)    I&O's Summary    30 Jan 2025 07:01  -  31 Jan 2025 07:00  --------------------------------------------------------  IN: 450 mL / OUT: 0 mL / NET: 450 mL        PHYSICAL EXAM:  Vital Signs Last 24 Hrs  T(C): 36.3 (31 Jan 2025 12:49), Max: 37.6 (30 Jan 2025 20:04)  T(F): 97.4 (31 Jan 2025 12:49), Max: 99.6 (30 Jan 2025 20:04)  HR: 102 (31 Jan 2025 16:50) (76 - 140)  BP: 142/66 (31 Jan 2025 10:10) (99/56 - 163/73)  BP(mean): --  RR: 22 (31 Jan 2025 14:47) (17 - 26)  SpO2: 98% (31 Jan 2025 16:50) (88% - 98%)    Parameters below as of 31 Jan 2025 16:50  Patient On (Oxygen Delivery Method): nasal cannula, high flow      CONSTITUTIONAL: resp distress, agonal breathing   RESPIRATORY: Normal respiratory effort; lungs are clear to auscultation bilaterally  CARDIOVASCULAR: Tachycardic, irregular, normal S1 and S2, no murmur/rub/gallop; No lower extremity edema; Peripheral pulses are 2+ bilaterally  ABDOMEN: Nontender to palpation, normoactive bowel sounds, no rebound/guarding; No hepatosplenomegaly  MUSCULOSKELETAL:  No clubbing or cyanosis of digits; no joint swelling or tenderness to palpation  SKIN: No rashes; no palpable lesions    LABS:                        11.3   22.94 )-----------( 162      ( 31 Jan 2025 07:18 )             34.8     01-31    142  |  109[H]  |  18  ----------------------------<  272[H]  4.8   |  20[L]  |  0.48[L]    Ca    8.5      31 Jan 2025 07:18  Phos  3.1     01-31  Mg     2.40     01-31    TPro  5.1[L]  /  Alb  2.7[L]  /  TBili  0.6  /  DBili  x   /  AST  34[H]  /  ALT  26  /  AlkPhos  89  01-31    PT/INR - ( 30 Jan 2025 19:27 )   PT: 15.0 sec;   INR: 1.26 ratio         PTT - ( 31 Jan 2025 07:18 )  PTT:51.5 sec      Urinalysis Basic - ( 31 Jan 2025 07:18 )    Color: x / Appearance: x / SG: x / pH: x  Gluc: 272 mg/dL / Ketone: x  / Bili: x / Urobili: x   Blood: x / Protein: x / Nitrite: x   Leuk Esterase: x / RBC: x / WBC x   Sq Epi: x / Non Sq Epi: x / Bacteria: x          RADIOLOGY & ADDITIONAL TESTS:  Results Reviewed:   Imaging Personally Reviewed:  Electrocardiogram Personally Reviewed:    COORDINATION OF CARE:  Care Discussed with Consultants/Other Providers [Y/N]:  Prior or Outpatient Records Reviewed [Y/N]:

## 2025-01-31 NOTE — PROGRESS NOTE ADULT - ASSESSMENT
64 year old female with a history of lung neuroendocrine tumor with pulmonary, bone and brain metastases, s/p WBRT in 11/2024, most recently on everolimus, papillary thyroid carcinoma s/p partial thyroidectomy, HTN, HLD presenting with worsening SOB and chest pain, likely due to worsening metastatic disease.    Diagnoses:   Neuroendocrine tumor of the lung metastatic to brain and bone on immunotherapy   Immunocompromised state  Hypercoagulable state secondary to neuroendocrine tumor   Severe protein-calorie malnutrition  Steroid induced hyperglycemia   Elevated lactate   Hypokalemia   HTN   Oral thrush   Opioid induced constipation   Edema   Acute hypoxic respiratory failure  Cytokine release syndrome   Atrial fibrillation with RVR

## 2025-02-01 NOTE — PROGRESS NOTE ADULT - PROBLEM SELECTOR PLAN 4
- Pt had unwitnessed fall in her room. Denied any LOC or head trauma  - CTH neg  - No focal neurological findings  - Pt denies any pain. Has some chronic back pain, unchanged  - Fall likely secondary to generalized weakness/deconditioning. Low suspicion for CNS or cardiac cause

## 2025-02-01 NOTE — PROGRESS NOTE ADULT - PROBLEM SELECTOR PLAN 3
- New onset A fib, likely triggered by CRS   - Cardiology consulted. Rec metoprolol.  Continue Losartan   - However, pt may not be able to tolerate PO. If needed, will given IV metoprolol prn   - Monitor on tele   - Therapeutic Lovenox

## 2025-02-01 NOTE — PROGRESS NOTE ADULT - SUBJECTIVE AND OBJECTIVE BOX
Cardiology Progress Note  ------------------------------------------------------------------------------------------  SUBJECTIVE:   No events overnight. Family at bedside.   -------------------------------------------------------------------------------------------  ROS:  Unable to provide, family member at bedside. NAD.   All other review of systems is negative unless indicated above.   -------------------------------------------------------------------------------------------  VS:  T(F): 98.1 (02-01), Max: 98.3 (02-01)  HR: 81 (02-01) (81 - 104)  BP: 143/70 (02-01) (125/64 - 145/75)  RR: 21 (02-01)  SpO2: 100% (02-01)  I&O's Summary    ------------------------------------------------------------------------------------------  PHYSICAL EXAM:    GENERAL: appears comforable on HFNC  NECK: Supple, No JVD.  HEART: IRR; No murmurs, rubs, or gallops.  ABDOMEN: Soft. Nondistended.   EXTREMITIES: No edema. No clubbing or cyanosis.   -------------------------------------------------------------------------------------------  LABS:  02-01    140  |  106  |  30[H]  ----------------------------<  231[H]  4.6   |  19[L]  |  0.67    Ca    8.2[L]      01 Feb 2025 06:32  Phos  3.8     02-01  Mg     2.60     02-01    TPro  4.6[L]  /  Alb  2.7[L]  /  TBili  0.7  /  DBili  x   /  AST  33[H]  /  ALT  22  /  AlkPhos  85  02-01    Creatinine Trend: 0.67<--, 0.48<--, 0.50<--, 0.41<--, 0.41<--, 0.37<--                        11.3   12.80 )-----------( 130      ( 01 Feb 2025 06:32 )             35.3     PT/INR - ( 30 Jan 2025 19:27 )   PT: 15.0 sec;   INR: 1.26 ratio         PTT - ( 31 Jan 2025 07:18 )  PTT:51.5 sec    Lipid Panel: T(F): 98.1 (02-01), Max: 98.3 (02-01)  HR: 81 (02-01) (81 - 104)  BP: 143/70 (02-01) (125/64 - 145/75)  RR: 21 (02-01)  SpO2: 100% (02-01)  Cardiac Enzymes:         -------------------------------------------------------------------------------------------  Meds:  atorvastatin 10 milliGRAM(s) Oral at bedtime  chlorhexidine 2% Cloths 1 Application(s) Topical daily  dextrose 5%. 1000 milliLiter(s) IV Continuous <Continuous>  dextrose 5%. 1000 milliLiter(s) IV Continuous <Continuous>  dextrose 50% Injectable 25 Gram(s) IV Push once  dextrose 50% Injectable 12.5 Gram(s) IV Push once  dextrose 50% Injectable 25 Gram(s) IV Push once  dextrose Oral Gel 15 Gram(s) Oral once PRN  glucagon  Injectable 1 milliGRAM(s) IntraMuscular once  HYDROmorphone  Injectable 0.5 milliGRAM(s) IV Push every 4 hours PRN  HYDROmorphone  Injectable 0.2 milliGRAM(s) IV Push every 8 hours  influenza  Vaccine (HIGH DOSE) 0.5 milliLiter(s) IntraMuscular once  insulin lispro (ADMELOG) corrective regimen sliding scale   SubCutaneous at bedtime  insulin lispro (ADMELOG) corrective regimen sliding scale   SubCutaneous three times a day before meals  levalbuterol Inhalation 0.63 milliGRAM(s) Inhalation every 6 hours  LORazepam   Injectable 0.12 milliGRAM(s) IV Push every 6 hours PRN  losartan 50 milliGRAM(s) Oral daily  melatonin 3 milliGRAM(s) Oral at bedtime PRN  methylPREDNISolone sodium succinate Injectable 40 milliGRAM(s) IV Push every 12 hours  metoclopramide Injectable 10 milliGRAM(s) IV Push every 8 hours PRN  metoprolol tartrate 25 milliGRAM(s) Oral every 8 hours  mirtazapine 7.5 milliGRAM(s) Oral at bedtime  montelukast 10 milliGRAM(s) Oral daily  multivitamin 1 Tablet(s) Oral daily  naloxone Injectable 0.1 milliGRAM(s) IV Push every 3 minutes PRN  nystatin    Suspension 839306 Unit(s) Oral every 6 hours  pantoprazole    Tablet 40 milliGRAM(s) Oral two times a day  piperacillin/tazobactam IVPB.. 3.375 Gram(s) IV Intermittent every 8 hours  polyethylene glycol 3350 17 Gram(s) Oral two times a day  senna 2 Tablet(s) Oral at bedtime  sodium chloride 0.9%. 1000 milliLiter(s) IV Continuous <Continuous>  trimethoprim  160 mG/sulfamethoxazole 800 mG 1 Tablet(s) Oral <User Schedule>    -------------------------------------------------------------------------------------------  Cardiovascular Diagnostic Testing:  -------------------------------------------------------------------------------------------  ECG:     Echo:     Stress Testing:    Cath:    Imaging:    CXR:  reviewed  -------------------------------------------------------------------------------------------  Assessment and Plan:   -------------------------------------------------------------------------------------------  Problems Assessed:   1. Atrial fibrillation with RVR- improved.   2. Acute hypoxic respiratory failure- improved.   3. Metastatic Lung cancer     Plan:   Currently respiratory status improved on NIV, patient is DNR/DNI.   Echocardiogram pending   Atrial fibrillation is rate controlled with current dose of metoprolol  Started on anticoagulation for elevated [id number], monitor closely for bleeding. Trend Hgb.   -------------------------------------------------------------------------------------------  Billing Statement:   51 minutes spent on total encounter. Necessity of time spent during this encounter on this date of service was due to review of medical information in EMR, co-ordination of hospital and outpatient care, discussion with patient and communication with primary team. Discussed atrial fibrillation with family member at bedside.   -------------------------------------------------------------------------------------------  Brian Caldwell MD   of Cardiology  [facility]  [address], [address]  [place] [address]  Phone: [phone]  Fax: [phone]    Please check [email or web address]: [email or web address] for cardiology service schedule and contact information.

## 2025-02-01 NOTE — PROGRESS NOTE ADULT - PROBLEM SELECTOR PLAN 1
Worsening metastatic disease noted on CT imaging. Most recently was on everolimus- patient's daughter reports patient is no longer on any kind of treatment. Plan was to follow up with Dr. Marx outpatient to discuss other treatment options once patient returned to the [place]  - s/p Tarlatamab on 1/29 complicated by fever with acute hypoxic resp failure c/w CRS  - s/p Tocilizumab x3 doses. IV solumedrol given. Will continue steroids  - Currently on 100% FIO2 HFNC  - Pulm consulted- continue standing nebs, Guaifensin  - Pall care following. Pt DNR/DNI. Per family, hoping to take patient home with hospice if she improves. At this time prognosis is very poor, not stable enough for home hospice. Will continue treatment for CRS

## 2025-02-01 NOTE — PROGRESS NOTE ADULT - SUBJECTIVE AND OBJECTIVE BOX
Patient is a 64y old  Female who presents with a chief complaint of Shortness of breath and chest pain (01 Feb 2025 11:12)      SUBJECTIVE / OVERNIGHT EVENTS: No acute events overnight. Pt is sleeping. Daughter at bedside states pt was awake last night and talking. She woke up this morning as well and ate small amounts of food     ADDITIONAL REVIEW OF SYSTEMS:    MEDICATIONS  (STANDING):  atorvastatin 10 milliGRAM(s) Oral at bedtime  chlorhexidine 2% Cloths 1 Application(s) Topical daily  dextrose 5%. 1000 milliLiter(s) (50 mL/Hr) IV Continuous <Continuous>  dextrose 5%. 1000 milliLiter(s) (100 mL/Hr) IV Continuous <Continuous>  dextrose 50% Injectable 25 Gram(s) IV Push once  dextrose 50% Injectable 12.5 Gram(s) IV Push once  dextrose 50% Injectable 25 Gram(s) IV Push once  enoxaparin Injectable 40 milliGRAM(s) SubCutaneous every 12 hours  glucagon  Injectable 1 milliGRAM(s) IntraMuscular once  HYDROmorphone  Injectable 0.2 milliGRAM(s) IV Push every 8 hours  influenza  Vaccine (HIGH DOSE) 0.5 milliLiter(s) IntraMuscular once  insulin lispro (ADMELOG) corrective regimen sliding scale   SubCutaneous three times a day before meals  insulin lispro (ADMELOG) corrective regimen sliding scale   SubCutaneous at bedtime  levalbuterol Inhalation 0.63 milliGRAM(s) Inhalation every 6 hours  losartan 50 milliGRAM(s) Oral daily  methylPREDNISolone sodium succinate Injectable 40 milliGRAM(s) IV Push every 12 hours  metoprolol tartrate 25 milliGRAM(s) Oral every 8 hours  mirtazapine 7.5 milliGRAM(s) Oral at bedtime  montelukast 10 milliGRAM(s) Oral daily  multivitamin 1 Tablet(s) Oral daily  nystatin    Suspension 653808 Unit(s) Oral every 6 hours  pantoprazole    Tablet 40 milliGRAM(s) Oral two times a day  piperacillin/tazobactam IVPB.. 3.375 Gram(s) IV Intermittent every 8 hours  polyethylene glycol 3350 17 Gram(s) Oral two times a day  senna 2 Tablet(s) Oral at bedtime  sodium chloride 0.9%. 1000 milliLiter(s) (100 mL/Hr) IV Continuous <Continuous>  trimethoprim  160 mG/sulfamethoxazole 800 mG 1 Tablet(s) Oral <User Schedule>    MEDICATIONS  (PRN):  dextrose Oral Gel 15 Gram(s) Oral once PRN Blood Glucose LESS THAN 70 milliGRAM(s)/deciliter  guaiFENesin Oral Liquid (Sugar-Free) 100 milliGRAM(s) Oral every 6 hours PRN Cough  HYDROmorphone  Injectable 0.5 milliGRAM(s) IV Push every 4 hours PRN Severe Pain (7 - 10)  LORazepam   Injectable 0.12 milliGRAM(s) IV Push every 6 hours PRN Anxiety  melatonin 3 milliGRAM(s) Oral at bedtime PRN Insomnia  metoclopramide Injectable 10 milliGRAM(s) IV Push every 8 hours PRN Nausea/vomiting  naloxone Injectable 0.1 milliGRAM(s) IV Push every 3 minutes PRN For ANY of the following changes in patient status:  A. RR LESS THAN 10 breaths per minute, B. Oxygen saturation LESS THAN 90%, C. Sedation score of 6      CAPILLARY BLOOD GLUCOSE      POCT Blood Glucose.: 195 mg/dL (01 Feb 2025 12:21)  POCT Blood Glucose.: 251 mg/dL (01 Feb 2025 08:33)  POCT Blood Glucose.: 203 mg/dL (31 Jan 2025 22:46)  POCT Blood Glucose.: 158 mg/dL (31 Jan 2025 17:47)    I&O's Summary      PHYSICAL EXAM:  Vital Signs Last 24 Hrs  T(C): 36.9 (01 Feb 2025 13:28), Max: 36.9 (01 Feb 2025 13:28)  T(F): 98.4 (01 Feb 2025 13:28), Max: 98.4 (01 Feb 2025 13:28)  HR: 80 (01 Feb 2025 13:28) (80 - 103)  BP: 160/62 (01 Feb 2025 13:28) (125/64 - 160/62)  BP(mean): --  RR: 22 (01 Feb 2025 13:28) (20 - 22)  SpO2: 99% (01 Feb 2025 13:28) (95% - 100%)    Parameters below as of 01 Feb 2025 13:28  Patient On (Oxygen Delivery Method): nasal cannula, high flow      CONSTITUTIONAL: resp distress, agonal breathing   RESPIRATORY: Normal respiratory effort; lungs are clear to auscultation bilaterally  CARDIOVASCULAR: Tachycardic, irregular, normal S1 and S2, no murmur/rub/gallop; No lower extremity edema; Peripheral pulses are 2+ bilaterally  ABDOMEN: Nontender to palpation, normoactive bowel sounds, no rebound/guarding; No hepatosplenomegaly  MUSCULOSKELETAL:  No clubbing or cyanosis of digits; no joint swelling or tenderness to palpation  SKIN: No rashes; no palpable lesions        LABS:                        11.3   12.80 )-----------( 130      ( 01 Feb 2025 06:32 )             35.3     02-01    140  |  106  |  30[H]  ----------------------------<  231[H]  4.6   |  19[L]  |  0.67    Ca    8.2[L]      01 Feb 2025 06:32  Phos  3.8     02-01  Mg     2.60     02-01    TPro  4.6[L]  /  Alb  2.7[L]  /  TBili  0.7  /  DBili  x   /  AST  33[H]  /  ALT  22  /  AlkPhos  85  02-01    PT/INR - ( 30 Jan 2025 19:27 )   PT: 15.0 sec;   INR: 1.26 ratio         PTT - ( 31 Jan 2025 07:18 )  PTT:51.5 sec      Urinalysis Basic - ( 01 Feb 2025 06:32 )    Color: x / Appearance: x / SG: x / pH: x  Gluc: 231 mg/dL / Ketone: x  / Bili: x / Urobili: x   Blood: x / Protein: x / Nitrite: x   Leuk Esterase: x / RBC: x / WBC x   Sq Epi: x / Non Sq Epi: x / Bacteria: x        Culture - Blood (collected 30 Jan 2025 10:30)  Source: .Blood BLOOD  Preliminary Report (31 Jan 2025 19:02):    No growth at 24 hours        RADIOLOGY & ADDITIONAL TESTS:  Results Reviewed:   Imaging Personally Reviewed:  Electrocardiogram Personally Reviewed:    COORDINATION OF CARE:  Care Discussed with Consultants/Other Providers [Y/N]:  Prior or Outpatient Records Reviewed [Y/N]:

## 2025-02-01 NOTE — PROGRESS NOTE ADULT - ASSESSMENT
64 year old female with a history of lung neuroendocrine tumor with pulmonary, bone and brain metastases, s/p WBRT in 11/2024, most recently on everolimus, papillary thyroid carcinoma s/p partial thyroidectomy, HTN, HLD presenting with worsening SOB and chest pain, likely due to worsening metastatic disease.    Diagnoses:   Neuroendocrine tumor of the lung metastatic to brain and bone on immunotherapy   Immunocompromised state  Hypercoagulable state secondary to neuroendocrine tumor   Severe protein-calorie malnutrition  Steroid induced hyperglycemia   Elevated lactate   Hypokalemia   HTN   Oral thrush   Opioid induced constipation   Edema   Acute hypoxic respiratory failure  Cytokine release syndrome   Atrial fibrillation with RVR    64 year old female with a history of lung neuroendocrine tumor with pulmonary, bone and brain metastases, s/p WBRT in 11/2024, most recently on everolimus, papillary thyroid carcinoma s/p partial thyroidectomy, HTN, HLD presenting with worsening SOB and chest pain, likely due to worsening metastatic disease.    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

## 2025-02-02 NOTE — CONSULT NOTE ADULT - SUBJECTIVE AND OBJECTIVE BOX
CHIEF COMPLAINT:    HPI:  64 year old female with a history of lung neuroendocrine tumor with pulmonary, bone and brain metastases, s/p WBRT in 2024, most recently on everolimus, papillary thyroid carcinoma s/p partial thyroidectomy, HTN, HLD presenting with worsening SOB and chest pain. Patient's daughter assisted with history. Patient recently returned from a 2 month trip to the [facility]- returned 3 days ago. During the past 2 months she has had worsening fatigue and low appetite, as well as worsening balance/gait. She has had worsening pain especially in her L rib area and back. Oxycodone helps with the pain when she takes it- usually takes it about every 4-5 hours during the day as needed. Overnight she usually tries to go without taking the oxycodone but wakes up in pain. Denies constipation- previously used to have diarrhea but now has normal BMs while on narcotics. Pt previously was on dexamethasone 4mg BID at the start of her trip, but then noticed worsening swelling in her legs and developed moon facies and spoke with her oncology team who recommended decreasing the dose to 4mg qd. Patient denies fevers but does often feel cold; denies nausea or abdominal pain, no URI symptoms. (2025 18:41)    RRT called 25 PM for work of breathing on HFNC. Patient placed on BIPAP during RRT and given duonebs and dilaudid w/ reported symptomatic improvement. MICU consulted for new BIPAP. Patient seen and evaluated. BiPAP settings noted to be 12/5/100%. Patient not participating in ROS.      PAST MEDICAL & SURGICAL HISTORY:  Hypertension      Hyperlipidemia      Former smoker  1/2 ppd x 1yr      Thyroid cancer      Diabetes mellitus      Neuroendocrine neoplasm of lung      Cancer with pulmonary metastases      H/O  section      S/P left oophorectomy      H/O: hysterectomy      H/O fracture of leg  left      History of thyroid surgery  partial rt thyroid ca      History of lung surgery  09/2021 x2          FAMILY HISTORY:  FH: CAD (coronary artery disease) (Father)        SOCIAL HISTORY:  Smoking: __ packs x ___ years  EtOH Use:  Marital Status:  Occupation:  Recent Travel:  Country of Birth:  Advance Directives:    Allergies    No Known Allergies    Intolerances        HOME MEDICATIONS:    REVIEW OF SYSTEMS:  Constitutional:   Eyes:  ENT:  CV:  Resp:  GI:  :  MSK:  Integumentary:  Neurological:  Psychiatric:  Endocrine:  Hematologic/Lymphatic:  Allergic/Immunologic:  [ ] All other systems negative  [x] Unable to assess ROS     OBJECTIVE:  ICU Vital Signs Last 24 Hrs  T(C): 36.5 (2025 22:00), Max: 36.7 (2025 14:35)  T(F): 97.7 (2025 22:00), Max: 98.1 (2025 14:35)  HR: 118 (2025 22:00) (71 - 141)  BP: 101/66 (2025 22:00) (101/66 - 152/73)  BP(mean): --  ABP: --  ABP(mean): --  RR: 18 (2025 22:00) (18 - 21)  SpO2: 94% (2025 22:00) (66% - 100%)    O2 Parameters below as of 2025 22:00  Patient On (Oxygen Delivery Method): BiPAP/CPAP        CAPILLARY BLOOD GLUCOSE      POCT Blood Glucose.: 222 mg/dL (2025 20:47)      PHYSICAL EXAM:  General: appears chronically ill   Respiratory: increased respiratory effort, crackles b/l  Cardiovascular: no m/r/g  Abdomen: soft NT ND      HOSPITAL MEDICATIONS:  MEDICATIONS  (STANDING):  atorvastatin 10 milliGRAM(s) Oral at bedtime  chlorhexidine 2% Cloths 1 Application(s) Topical daily  dextrose 5%. 1000 milliLiter(s) (50 mL/Hr) IV Continuous <Continuous>  dextrose 5%. 1000 milliLiter(s) (100 mL/Hr) IV Continuous <Continuous>  dextrose 50% Injectable 25 Gram(s) IV Push once  dextrose 50% Injectable 12.5 Gram(s) IV Push once  dextrose 50% Injectable 25 Gram(s) IV Push once  enoxaparin Injectable 40 milliGRAM(s) SubCutaneous every 12 hours  glucagon  Injectable 1 milliGRAM(s) IntraMuscular once  HYDROmorphone  Injectable 0.2 milliGRAM(s) IV Push every 8 hours  influenza  Vaccine (HIGH DOSE) 0.5 milliLiter(s) IntraMuscular once  insulin lispro (ADMELOG) corrective regimen sliding scale   SubCutaneous three times a day before meals  insulin lispro (ADMELOG) corrective regimen sliding scale   SubCutaneous at bedtime  levalbuterol Inhalation 0.63 milliGRAM(s) Inhalation every 6 hours  losartan 50 milliGRAM(s) Oral daily  methylPREDNISolone sodium succinate Injectable 40 milliGRAM(s) IV Push every 12 hours  metoprolol tartrate 50 milliGRAM(s) Oral every 8 hours  mirtazapine 7.5 milliGRAM(s) Oral at bedtime  montelukast 10 milliGRAM(s) Oral daily  multivitamin 1 Tablet(s) Oral daily  nystatin    Suspension 150982 Unit(s) Oral every 6 hours  pantoprazole    Tablet 40 milliGRAM(s) Oral two times a day  piperacillin/tazobactam IVPB.. 3.375 Gram(s) IV Intermittent every 8 hours  polyethylene glycol 3350 17 Gram(s) Oral two times a day  senna 2 Tablet(s) Oral at bedtime  sodium chloride 0.9%. 1000 milliLiter(s) (100 mL/Hr) IV Continuous <Continuous>  trimethoprim  160 mG/sulfamethoxazole 800 mG 1 Tablet(s) Oral <User Schedule>    MEDICATIONS  (PRN):  dextrose Oral Gel 15 Gram(s) Oral once PRN Blood Glucose LESS THAN 70 milliGRAM(s)/deciliter  guaiFENesin Oral Liquid (Sugar-Free) 100 milliGRAM(s) Oral every 6 hours PRN Cough  HYDROmorphone  Injectable 0.5 milliGRAM(s) IV Push every 4 hours PRN Severe Pain (7 - 10)  LORazepam   Injectable 0.12 milliGRAM(s) IV Push every 6 hours PRN Anxiety  melatonin 3 milliGRAM(s) Oral at bedtime PRN Insomnia  metoclopramide Injectable 10 milliGRAM(s) IV Push every 8 hours PRN Nausea/vomiting  naloxone Injectable 0.1 milliGRAM(s) IV Push every 3 minutes PRN For ANY of the following changes in patient status:  A. RR LESS THAN 10 breaths per minute, B. Oxygen saturation LESS THAN 90%, C. Sedation score of 6      LABS:                        11.4   14.79 )-----------( 129      ( 2025 05:48 )             35.6     -    142  |  108[H]  |  33[H]  ----------------------------<  187[H]  4.6   |  17[L]  |  0.56    Ca    8.0[L]      2025 05:48  Phos  3.9     -  Mg     2.70         TPro  4.6[L]  /  Alb  2.5[L]  /  TBili  0.8  /  DBili  x   /  AST  38[H]  /  ALT  19  /  AlkPhos  105        Urinalysis Basic - ( 2025 05:48 )    Color: x / Appearance: x / SG: x / pH: x  Gluc: 187 mg/dL / Ketone: x  / Bili: x / Urobili: x   Blood: x / Protein: x / Nitrite: x   Leuk Esterase: x / RBC: x / WBC x   Sq Epi: x / Non Sq Epi: x / Bacteria: x        Venous Blood Gas:   @ 09:50  7.43/39/57/26/86.8  VBG Lactate: 2.2      MICROBIOLOGY:     RADIOLOGY:  [x] Reviewed and interpreted by me  CXR w/ b/l opacities, likley pulmonary congestion    EKG:

## 2025-02-02 NOTE — PROVIDER CONTACT NOTE (OTHER) - ASSESSMENT
Pt tachycardic to 141.  Pt denies chest pain and palpitations.   Pt resting in bed without complaints.

## 2025-02-02 NOTE — RAPID RESPONSE TEAM SUMMARY - NSSITUATIONBACKGROUNDRRT_GEN_ALL_CORE
64 year old female with a history of lung neuroendocrine tumor with pulmonary, bone and brain metastases, s/p WBRT in 11/2024, most recently on everolimus, papillary thyroid carcinoma s/p partial thyroidectomy, HTN, HLD presenting with worsening SOB and chest pain, likely due to worsening metastatic disease. RRT called for hypoxia to 70 with patient noted to have air hunger and increased work of breathing as she looked extremely uncomfortable. Patient BP noted to be 170/100s, with HR in 130s, and respritory rate >30 using accessory muscles. Previous labs and imaging noted. Exam notable for minor wheezing as well. CXR obtained with no interval changes compared to prior. Patient during the rapid did endorse wanting to become "comfort care". Patient also given 0.5IV dilauded, nebulized xopenex, duonebs also given 30 mins ago by RT. Patient IV access was obtained during RRT as there was some concern that the pervious ones were extravasating. Patient vitals stabilized after being placed on bipap. IPAP increased to 12, EPAP kept at 5 and FiO2 weaned down to 80%. Patient appeared much more comfortable with pressures now in 130s/80, HR in 90s. MICU called for new bipap. Previous IV fluids continues. 1mg ativan also suggested to be given to further help with anxiolysis. 64 year old female with a history of lung neuroendocrine tumor with pulmonary, bone and brain metastases, s/p WBRT in 11/2024, most recently on everolimus, papillary thyroid carcinoma s/p partial thyroidectomy, HTN, HLD presenting with worsening SOB and chest pain, likely due to worsening metastatic disease. RRT called for hypoxia to 70 with patient noted to have air hunger and increased work of breathing as she looked extremely uncomfortable. Patient BP noted to be 170/100s, with HR in 130s, and respiratory rate >30 using accessory muscles. Previous labs and imaging noted. Exam notable for minor wheezing as well. CXR obtained with worsened opacities compared to prior--concerning for effusion vs ARDS. Patient during the rapid did endorse wanting to become "comfort care". Patient also given 0.5IV dilauded, nebulized xopenex, duonebs also given 30 mins ago by RT. Patient IV access was obtained during RRT as there was some concern that the pervious ones were extravasating. Patient vitals stabilized after being placed on bipap. IPAP increased to 12, EPAP kept at 5 and FiO2 weaned down to 80%. Patient appeared much more comfortable with pressures now in 130s/80, HR in 90s. MICU called for new bipap. Previous IV fluids continues. 1mg ativan also suggested to be given to further help with anxiolysis. Also suggest ABG at later time to eval for ARDS vs new effusion. 64 year old female with a history of lung neuroendocrine tumor with pulmonary, bone and brain metastases, s/p WBRT in 11/2024, most recently on everolimus, papillary thyroid carcinoma s/p partial thyroidectomy, HTN, HLD presenting with worsening SOB and chest pain, likely due to worsening metastatic disease. RRT called for hypoxia to 70 with patient noted to have air hunger and increased work of breathing as she looked extremely uncomfortable. Patient BP noted to be 170/100s, with HR in 130s, and respiratory rate >30 using accessory muscles. Previous labs and imaging noted. Exam notable for minor wheezing as well. CXR obtained with worsened opacities compared to prior--concerning for effusion vs ARDS. Patient during the rapid did endorse wanting to become "comfort care". Patient also given 0.5IV dilauded, nebulized xopenex, duonebs also given 30 mins ago by RT. Patient IV access was obtained during RRT as there was some concern that the pervious ones were extravasating. Patient vitals stabilized after being placed on bipap. IPAP increased to 12, EPAP kept at 5 and FiO2 weaned down to 80%. Patient appeared much more comfortable with pressures now in 130s/80, HR in 90s. MICU called for new bipap. Previous IV fluids continues. 1mg ativan also suggested to be given to further help with anxiolysis. Also suggest ABG at later time to eval for ARDS vs new effusion if aligned with family goals.

## 2025-02-02 NOTE — RAPID RESPONSE TEAM SUMMARY - NSOTHERINTERVENTIONSRRT_GEN_ALL_CORE
Patient placed on HFNC and weaned down to 40/40. Patient left on HFNC due to concern for desaturation primarily while moving or being moved, which precipitated the initial desaturation event causing the RRT to be called. Advised primary team to replete electrolytes to K+ > 4 and Mg > 2. Recommend Xopenex. 
HFNC increased from 50/80% to 60/100%. Per  at bedside, patient had temporarily removed HFNC for several seconds, which resulted in desaturation due to patient's dependence on HFNC. Updated  at bedside. Prognosis guarded as per pulmonary note. 
Patient remains in respiratory distress despite Ativan and Dilaudid. Discussed with patient's daughter at bedside re: patient's oxygen status despite maximum oxygen therapy offered on the floor. Per pt's daughter, pt was previously considering hospice before trying one last treatment. Pt's daughter works with [employer] and states she has experience with patients with respiratory diseases. Advised pt's daughter to discuss GOC amongst the family with consideration for comfort-directed approach, which patient may be amenable to. Pt's daughter states understanding, that her father is coming. Primary team plans to readdress GOC once father arrives.

## 2025-02-02 NOTE — CONSULT NOTE ADULT - ATTENDING COMMENTS
64 year old female with a history of lung neuroendocrine tumor with pulmonary, bone and brain metastases, s/p WBRT in 11/2024, most recently on everolimus, papillary thyroid carcinoma s/p partial thyroidectomy, HTN, HLD presenting with worsening SOB and chest pain c/b RRT for acute hypoxia respiratory failure in the setting of elevated blood pressure, concern for pulm edema. MICU consulted for BIPAP  bipap as tolerated, wean as tolerated back to HFNC   check blood gas on bipap  infectious work up per primary team  DNR/DNI, consider palliative eval 64 year old female with a history of lung neuroendocrine tumor with pulmonary, bone and brain metastases, s/p WBRT in 11/2024, most recently on everolimus, papillary thyroid carcinoma s/p partial thyroidectomy, HTN, HLD presenting with worsening SOB and chest pain c/b RRT for acute hypoxia respiratory failure in the setting of elevated blood pressure, concern for pulm edema. MICU consulted for BIPAP  bipap as tolerated, wean as tolerated back to HFNC   check blood gas on bipap  Dilaudid prn   DNR/DNI, consider palliative eval/hopsice 64 year old female with a history of lung neuroendocrine tumor with pulmonary, bone and brain metastases, s/p WBRT in 11/2024, most recently on everolimus, papillary thyroid carcinoma s/p partial thyroidectomy, HTN, HLD presenting with worsening SOB and chest pain c/b RRT for acute hypoxia respiratory failure in the setting of elevated blood pressure, concern for pulm edema, c.b extensive lung mets. MICU consulted for BIPAP  bipap as tolerated, wean as tolerated back to HFNC   check blood gas on bipap  Dilaudid prn   DNR/DNI, consider palliative eval/hopsice  overall guarded prognosis

## 2025-02-02 NOTE — RAPID RESPONSE TEAM SUMMARY - NSSITUATIONBACKGROUNDRRT_GEN_ALL_CORE
64 year old female with a history of lung neuroendocrine tumor with pulmonary, bone and brain metastases, s/p WBRT in 11/2024, most recently on everolimus, papillary thyroid carcinoma s/p partial thyroidectomy, HTN, HLD presenting with worsening SOB and chest pain, likely due to worsening metastatic disease. RRT called for hypoxia.

## 2025-02-02 NOTE — PROGRESS NOTE ADULT - SUBJECTIVE AND OBJECTIVE BOX
Cardiology Progress Note  ------------------------------------------------------------------------------------------  SUBJECTIVE:   No events overnight. Denies CP, SOB or Palpitations.   -------------------------------------------------------------------------------------------  ROS:  Unable to provide, family member at bedside. NAD.   All other review of systems is negative unless indicated above.   -------------------------------------------------------------------------------------------  VS:  T(F): 97.9 (02-02), Max: 98.4 (02-01)  HR: 77 (02-02) (73 - 137)  BP: 122/73 (02-02) (122/73 - 152/73)  RR: 18 (02-02)  SpO2: 98% (02-02)  I&O's Summary    ------------------------------------------------------------------------------------------  PHYSICAL EXAM:    GENERAL: appears comforable on HFNC  NECK: Supple, No JVD.  HEART: IRR; No murmurs, rubs, or gallops.  ABDOMEN: Soft. Nondistended.   EXTREMITIES: No edema. No clubbing or cyanosi  -------------------------------------------------------------------------------------------  LABS:  02-02    142  |  108[H]  |  33[H]  ----------------------------<  187[H]  4.6   |  17[L]  |  0.56    Ca    8.0[L]      02 Feb 2025 05:48  Phos  3.9     02-02  Mg     2.70     02-02    TPro  4.6[L]  /  Alb  2.5[L]  /  TBili  0.8  /  DBili  x   /  AST  38[H]  /  ALT  19  /  AlkPhos  105  02-02    Creatinine Trend: 0.56<--, 0.67<--, 0.48<--, 0.50<--, 0.41<--, 0.41<--                        11.4   14.79 )-----------( 129      ( 02 Feb 2025 05:48 )             35.6         Lipid Panel: T(F): 97.9 (02-02), Max: 98.4 (02-01)  HR: 77 (02-02) (73 - 137)  BP: 122/73 (02-02) (122/73 - 152/73)  RR: 18 (02-02)  SpO2: 98% (02-02)  Cardiac Enzymes:         -------------------------------------------------------------------------------------------  Meds:  atorvastatin 10 milliGRAM(s) Oral at bedtime  chlorhexidine 2% Cloths 1 Application(s) Topical daily  dextrose 5%. 1000 milliLiter(s) IV Continuous <Continuous>  dextrose 5%. 1000 milliLiter(s) IV Continuous <Continuous>  dextrose 50% Injectable 25 Gram(s) IV Push once  dextrose 50% Injectable 12.5 Gram(s) IV Push once  dextrose 50% Injectable 25 Gram(s) IV Push once  dextrose Oral Gel 15 Gram(s) Oral once PRN  enoxaparin Injectable 40 milliGRAM(s) SubCutaneous every 12 hours  glucagon  Injectable 1 milliGRAM(s) IntraMuscular once  guaiFENesin Oral Liquid (Sugar-Free) 100 milliGRAM(s) Oral every 6 hours PRN  HYDROmorphone  Injectable 0.5 milliGRAM(s) IV Push every 4 hours PRN  HYDROmorphone  Injectable 0.2 milliGRAM(s) IV Push every 8 hours  influenza  Vaccine (HIGH DOSE) 0.5 milliLiter(s) IntraMuscular once  insulin lispro (ADMELOG) corrective regimen sliding scale   SubCutaneous three times a day before meals  insulin lispro (ADMELOG) corrective regimen sliding scale   SubCutaneous at bedtime  levalbuterol Inhalation 0.63 milliGRAM(s) Inhalation every 6 hours  LORazepam   Injectable 0.12 milliGRAM(s) IV Push every 6 hours PRN  losartan 50 milliGRAM(s) Oral daily  melatonin 3 milliGRAM(s) Oral at bedtime PRN  methylPREDNISolone sodium succinate Injectable 40 milliGRAM(s) IV Push every 12 hours  metoclopramide Injectable 10 milliGRAM(s) IV Push every 8 hours PRN  metoprolol tartrate 25 milliGRAM(s) Oral every 8 hours  mirtazapine 7.5 milliGRAM(s) Oral at bedtime  montelukast 10 milliGRAM(s) Oral daily  multivitamin 1 Tablet(s) Oral daily  naloxone Injectable 0.1 milliGRAM(s) IV Push every 3 minutes PRN  nystatin    Suspension 095443 Unit(s) Oral every 6 hours  pantoprazole    Tablet 40 milliGRAM(s) Oral two times a day  piperacillin/tazobactam IVPB.. 3.375 Gram(s) IV Intermittent every 8 hours  polyethylene glycol 3350 17 Gram(s) Oral two times a day  senna 2 Tablet(s) Oral at bedtime  sodium chloride 0.9%. 1000 milliLiter(s) IV Continuous <Continuous>  trimethoprim  160 mG/sulfamethoxazole 800 mG 1 Tablet(s) Oral <User Schedule>    -------------------------------------------------------------------------------------------  Cardiovascular Diagnostic Testing:  -------------------------------------------------------------------------------------------  ECG:     Echo:     Stress Testing:    Cath:    Imaging:    CXR:  reviewed  -------------------------------------------------------------------------------------------  Assessment and Plan:   -------------------------------------------------------------------------------------------  Problems Assessed:   Problems Assessed:   1. Atrial fibrillation with RVR- improved.   2. Acute hypoxic respiratory failure- improved.   3. Metastatic Lung cancer     Plan:   Currently respiratory status improved on NIV, patient is DNR/DNI.   Discontinue Echocardiogram order, additional will not .   Atrial fibrillation is rate controlled with current dose of metoprolol. Up titrate as needed to maintain good rate control with HR <110    Started on Lovenox for elevated [id number], monitor closely for bleeding.   -------------------------------------------------------------------------------------------  Billing Statement:   36 minutes spent on total encounter. Necessity of time spent during this encounter on this date of service was due to review of medical information in EMR, co-ordination of hospital and outpatient care, discussion with patient and communication with primary team. Discussed atrial fibrillation with family member at bedside.     ------------------------------------------------------------------------------------------  Brian Caldwell MD   of Cardiology  [facility] at [address], [address] [place] [address]  Phone: [phone]  Fax: [phone]  Please check [email or web address]: [email or web address] for cardiology service schedule and contact information.   ------------------------------------------------------------------------------------------  Billing Statement:   76/56/51/36 minutes spent on total encounter. Necessity of time spent during this encounter on this date of service was due to review of medical information in EMR, co-ordination of hospital and outpatient care, discussion with patient and communication with primary team.   ------------------------------------------------------------------------------------------- Cardiology Progress Note  ------------------------------------------------------------------------------------------  SUBJECTIVE:   No events overnight. Denies CP, SOB or Palpitations.   -------------------------------------------------------------------------------------------  ROS:  Unable to provide, family member at bedside. NAD.   All other review of systems is negative unless indicated above.   -------------------------------------------------------------------------------------------  VS:  T(F): 97.9 (02-02), Max: 98.4 (02-01)  HR: 77 (02-02) (73 - 137)  BP: 122/73 (02-02) (122/73 - 152/73)  RR: 18 (02-02)  SpO2: 98% (02-02)  I&O's Summary    ------------------------------------------------------------------------------------------  PHYSICAL EXAM:    GENERAL: appears comforable on HFNC  NECK: Supple, No JVD.  HEART: IRR; No murmurs, rubs, or gallops.  ABDOMEN: Soft. Nondistended.   EXTREMITIES: No edema. No clubbing or cyanosi  -------------------------------------------------------------------------------------------  LABS:  02-02    142  |  108[H]  |  33[H]  ----------------------------<  187[H]  4.6   |  17[L]  |  0.56    Ca    8.0[L]      02 Feb 2025 05:48  Phos  3.9     02-02  Mg     2.70     02-02    TPro  4.6[L]  /  Alb  2.5[L]  /  TBili  0.8  /  DBili  x   /  AST  38[H]  /  ALT  19  /  AlkPhos  105  02-02    Creatinine Trend: 0.56<--, 0.67<--, 0.48<--, 0.50<--, 0.41<--, 0.41<--                        11.4   14.79 )-----------( 129      ( 02 Feb 2025 05:48 )             35.6         Lipid Panel: T(F): 97.9 (02-02), Max: 98.4 (02-01)  HR: 77 (02-02) (73 - 137)  BP: 122/73 (02-02) (122/73 - 152/73)  RR: 18 (02-02)  SpO2: 98% (02-02)  Cardiac Enzymes:         -------------------------------------------------------------------------------------------  Meds:  atorvastatin 10 milliGRAM(s) Oral at bedtime  chlorhexidine 2% Cloths 1 Application(s) Topical daily  dextrose 5%. 1000 milliLiter(s) IV Continuous <Continuous>  dextrose 5%. 1000 milliLiter(s) IV Continuous <Continuous>  dextrose 50% Injectable 25 Gram(s) IV Push once  dextrose 50% Injectable 12.5 Gram(s) IV Push once  dextrose 50% Injectable 25 Gram(s) IV Push once  dextrose Oral Gel 15 Gram(s) Oral once PRN  enoxaparin Injectable 40 milliGRAM(s) SubCutaneous every 12 hours  glucagon  Injectable 1 milliGRAM(s) IntraMuscular once  guaiFENesin Oral Liquid (Sugar-Free) 100 milliGRAM(s) Oral every 6 hours PRN  HYDROmorphone  Injectable 0.5 milliGRAM(s) IV Push every 4 hours PRN  HYDROmorphone  Injectable 0.2 milliGRAM(s) IV Push every 8 hours  influenza  Vaccine (HIGH DOSE) 0.5 milliLiter(s) IntraMuscular once  insulin lispro (ADMELOG) corrective regimen sliding scale   SubCutaneous three times a day before meals  insulin lispro (ADMELOG) corrective regimen sliding scale   SubCutaneous at bedtime  levalbuterol Inhalation 0.63 milliGRAM(s) Inhalation every 6 hours  LORazepam   Injectable 0.12 milliGRAM(s) IV Push every 6 hours PRN  losartan 50 milliGRAM(s) Oral daily  melatonin 3 milliGRAM(s) Oral at bedtime PRN  methylPREDNISolone sodium succinate Injectable 40 milliGRAM(s) IV Push every 12 hours  metoclopramide Injectable 10 milliGRAM(s) IV Push every 8 hours PRN  metoprolol tartrate 25 milliGRAM(s) Oral every 8 hours  mirtazapine 7.5 milliGRAM(s) Oral at bedtime  montelukast 10 milliGRAM(s) Oral daily  multivitamin 1 Tablet(s) Oral daily  naloxone Injectable 0.1 milliGRAM(s) IV Push every 3 minutes PRN  nystatin    Suspension [id number] Unit(s) Oral every 6 hours  pantoprazole    Tablet 40 milliGRAM(s) Oral two times a day  piperacillin/tazobactam IVPB.. 3.375 Gram(s) IV Intermittent every 8 hours  polyethylene glycol 3350 17 Gram(s) Oral two times a day  senna 2 Tablet(s) Oral at bedtime  sodium chloride 0.9%. 1000 milliLiter(s) IV Continuous <Continuous>  trimethoprim  160 mG/sulfamethoxazole 800 mG 1 Tablet(s) Oral <User Schedule>    -------------------------------------------------------------------------------------------  Cardiovascular Diagnostic Testing:  -------------------------------------------------------------------------------------------  ECG:     Echo:     Stress Testing:    Cath:    Imaging:    CXR:  reviewed  -------------------------------------------------------------------------------------------  Assessment and Plan:   -------------------------------------------------------------------------------------------  Problems Assessed:   1. Atrial fibrillation with RVR- Stable.   2. Acute hypoxic respiratory failure- improved.   3. Metastatic Lung cancer     Plan:   Currently respiratory status improved on NIV, patient is DNR/DNI.   Discontinue Echocardiogram order, additional will not .   Atrial fibrillation is rate controlled with current dose of metoprolol. Up titrate as needed to maintain good rate control with HR <110    Started on Lovenox for elevated [id number], monitor closely for bleeding.   -------------------------------------------------------------------------------------------  Billing Statement:   36 minutes spent on total encounter. Necessity of time spent during this encounter on this date of service was due to review of medical information in EMR, co-ordination of hospital and outpatient care, discussion with patient and communication with primary team. Discussed atrial fibrillation with family member at bedside.     ------------------------------------------------------------------------------------------  Brian Caldwell MD   of Cardiology  [facility] at [address], [address] [place] [address]  Phone: [phone]  Fax: [phone]  Please check [email or web address]: [email or web address] for cardiology service schedule and contact information.   ------------------------------------------------------------------------------------------  Billing Statement:   76/56/51/36 minutes spent on total encounter. Necessity of time spent during this encounter on this date of service was due to review of medical information in EMR, co-ordination of hospital and outpatient care, discussion with patient and communication with primary team.   -------------------------------------------------------------------------------------------

## 2025-02-02 NOTE — PROVIDER CONTACT NOTE (OTHER) - ASSESSMENT
Call from tele tech pt had multiple PVCS and tachycardia to 137  Pt resting comfortably in bed. Denies chest pain or palpitations.   VSS.  HR now NSR with HR in 70-80s.

## 2025-02-02 NOTE — RAPID RESPONSE TEAM SUMMARY - NSMEDICATIONSRRT_GEN_ALL_CORE
dilauded, xopenex
Decadron as per oncology hospitalist   IV Tylenol   
Xopenex   IV Ofirmev   IV Dilaudid 0.4 mg   IV Ativan 0.25 mg

## 2025-02-02 NOTE — RAPID RESPONSE TEAM SUMMARY - NSREASONFORCALLINGRRT_GEN_ALL_CORE
Abnormal respiratory rate/Hypoxia
Abnormal heart rate/Abnormal respiratory rate/Hypertension/Hypoxia
Hypoxia

## 2025-02-02 NOTE — CHART NOTE - NSCHARTNOTEFT_GEN_A_CORE
Chief Complaint   Patient presents with    Surgical Follow-up     4 weeks s/p laparoscopic gastric [id number] pound lost 9. Jeannie Brunner is 4 weeks status post Malabsorptive gastric bypass for treatment of morbid obesity . Presents today for obesity management. Patient has lost 30 lbs. Since surgery. Patient is satisfied with progress. Patient is consuming about 45-55 grams of protein daily. Using protein powders   Ice water is giving her a \"funny feeling\" and she feels better with other liquids     Patient is drinking about 48 oz of fluids per day. Bowels moving  daily. Constipated  no  Using laxatives no  Nausea  no  Regurgitation  no  No fever or chills, chest pain or shortness of breath. Vitamin compliance Yes taking all bariatric supplements   Activity  Walking   Sleep   Ok     Physical Exam  Visit Vitals  /83   Pulse 100   Temp 97.9 °F (36.6 °C) (Oral)   Resp 20   Ht 5' 8\" (1.727 m)   Wt (!) 362 lb (164.2 kg)   LMP 07/04/2019 (Approximate)   SpO2 94%   BMI 55.04 kg/m²     A + O x 3  Chest  CTA, unlabored   COR  RRR  ABD Soft, obese, lap sites C/D/I well healed; NT/ND, no masses or hernias   EXT No edema; ambulating independently       ICD-10-CM ICD-9-CM    1. Follow-up examination after abdominal surgery Z09 V67.09    2. Morbid obesity with BMI of 40.0-44.9, [facility] E66.01 278.01     Z68.41 V85.41    3. Postoperative intestinal malabsorption K91.2 579.3        Jeannie Brunner is 4 weeks  s/p Malabsorptive gastric bypass for treatment of morbid obesity  doing well   Increase pepcid to bid and see if it helps with unsettled feeling in stomachs   Diet soft stage II   Continue vitamins and protein supplements   Activity daily walking 10 minutes every hour   Sleep hygiene reviewed   Follow-up in 2 weeks   Support group  Jeannie Brunner verbalized understanding and questions were answered to the best of my knowledge and ability.     Diet, activity and mindfulness educational materials RRT called overnight for hypoxia and increased WOB.    Patient is a 64 year old female with a history of lung neuroendocrine tumor with pulmonary, bone and brain metastases, s/p WBRT in 11/2024, most recently on everolimus, papillary thyroid carcinoma s/p partial thyroidectomy, HTN, HLD presenting with worsening SOB and chest pain, likely due to worsening metastatic disease.   RRT called for hypoxia to 70 with patient noted to have air hunger and increased work of breathing as she looked extremely uncomfortable. Patient BP noted to be 170/100s, with HR in 130s, and respiratory rate >30 using accessory muscles. Previous labs and imaging noted. Exam notable for minor wheezing as well. CXR obtained with worsened opacities compared to prior--concerning for effusion vs ARDS. Patient during the rapid did endorse wanting to become "comfort care". Patient also given 0.5IV dilauded, nebulized xopenex, duonebs also given 30 mins ago by RT. Patient IV access was obtained during RRT as there was some concern that the pervious ones were extravasating. Patient vitals stabilized after being placed on bipap. IPAP increased to 12, EPAP kept at 5 and FiO2 weaned down to 80%. Patient appeared much more comfortable with pressures now in 130s/80, HR in 90s. MICU called for new bipap.   IV fluid bolus were provided. 16 minutes spent in face to face with Antonia Relic > 50% counseling. RRT called overnight for hypoxia and increased WOB.    Patient is a 64 year old female with a history of lung neuroendocrine tumor with pulmonary, bone and brain metastases, s/p WBRT in 11/2024, most recently on everolimus, papillary thyroid carcinoma s/p partial thyroidectomy, HTN, HLD presenting with worsening SOB and chest pain, likely due to worsening metastatic disease.   RRT called for hypoxia to 70 with patient noted to have air hunger and increased work of breathing. Patient BP noted to be 170/100s, with HR in 130s, and respiratory rate >30 using accessory muscles.   Exam notable for minor wheezing as well. CXR obtained with worsened opacities compared to prior--concerning for effusion vs ARDS. Patient given 0.5 IV Dilaudid, nebulized xopenex,. MICU consulted for new bipap.  Family at bedside updated on plan and all questions answered. GOC discussed, family still wishing for DNR/DNI with interventions.   [facility] Dr. Herrera made aware.      Vania Lagunas PA-C  Department of Medicine [id number] RRT called overnight for hypoxia and increased WOB.    Patient is a 64 year old female with a history of lung neuroendocrine tumor with pulmonary, bone and brain metastases, s/p WBRT in 11/2024, most recently on everolimus, papillary thyroid carcinoma s/p partial thyroidectomy, HTN, HLD presenting with worsening SOB and chest pain, likely due to worsening metastatic disease.   RRT called for hypoxia to 70. Patient noted to have air hunger and increased work of breathing. Patient BP noted to be 170/100s, with HR in 130s, and respiratory rate >30 using accessory muscles.   Exam notable for minor wheezing as well. CXR obtained with worsened opacities compared to prior--concerning for effusion vs ARDS. Patient given 0.5 IV Dilaudid, nebulized xopenex,. MICU consulted for new bipap.  Family at bedside updated on plan and all questions answered. GOC discussed, family still wishing for DNR/DNI with interventions.   [facility] Dr. Herrera made aware.      Vania Lagunas PA-C  Department of Medicine [id number]

## 2025-02-02 NOTE — PROGRESS NOTE ADULT - ASSESSMENT
64 year old female with a history of lung neuroendocrine tumor with pulmonary, bone and brain metastases, s/p WBRT in 11/2024, most recently on everolimus, papillary thyroid carcinoma s/p partial thyroidectomy, HTN, HLD presenting with worsening SOB and chest pain, likely due to worsening metastatic disease.    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

## 2025-02-02 NOTE — PROGRESS NOTE ADULT - SUBJECTIVE AND OBJECTIVE BOX
Patient is a 64y old  Female who presents with a chief complaint of Shortness of breath and chest pain (02 Feb 2025 13:38)      SUBJECTIVE / OVERNIGHT EVENTS:  at bedside. Pt intermittently awake, asked for water, though appears weak, minimally verbal.  expressed concern that pt sometimes pulls at the [facility]     ADDITIONAL REVIEW OF SYSTEMS:    MEDICATIONS  (STANDING):  atorvastatin 10 milliGRAM(s) Oral at bedtime  chlorhexidine 2% Cloths 1 Application(s) Topical daily  dextrose 5%. 1000 milliLiter(s) (50 mL/Hr) IV Continuous <Continuous>  dextrose 5%. 1000 milliLiter(s) (100 mL/Hr) IV Continuous <Continuous>  dextrose 50% Injectable 25 Gram(s) IV Push once  dextrose 50% Injectable 12.5 Gram(s) IV Push once  dextrose 50% Injectable 25 Gram(s) IV Push once  enoxaparin Injectable 40 milliGRAM(s) SubCutaneous every 12 hours  glucagon  Injectable 1 milliGRAM(s) IntraMuscular once  HYDROmorphone  Injectable 0.2 milliGRAM(s) IV Push every 8 hours  influenza  Vaccine (HIGH DOSE) 0.5 milliLiter(s) IntraMuscular once  insulin lispro (ADMELOG) corrective regimen sliding scale   SubCutaneous three times a day before meals  insulin lispro (ADMELOG) corrective regimen sliding scale   SubCutaneous at bedtime  levalbuterol Inhalation 0.63 milliGRAM(s) Inhalation every 6 hours  losartan 50 milliGRAM(s) Oral daily  methylPREDNISolone sodium succinate Injectable 40 milliGRAM(s) IV Push every 12 hours  metoprolol tartrate 25 milliGRAM(s) Oral every 8 hours  mirtazapine 7.5 milliGRAM(s) Oral at bedtime  montelukast 10 milliGRAM(s) Oral daily  multivitamin 1 Tablet(s) Oral daily  nystatin    Suspension 594018 Unit(s) Oral every 6 hours  pantoprazole    Tablet 40 milliGRAM(s) Oral two times a day  piperacillin/tazobactam IVPB.. 3.375 Gram(s) IV Intermittent every 8 hours  polyethylene glycol 3350 17 Gram(s) Oral two times a day  senna 2 Tablet(s) Oral at bedtime  sodium chloride 0.9%. 1000 milliLiter(s) (100 mL/Hr) IV Continuous <Continuous>  trimethoprim  160 mG/sulfamethoxazole 800 mG 1 Tablet(s) Oral <User Schedule>    MEDICATIONS  (PRN):  dextrose Oral Gel 15 Gram(s) Oral once PRN Blood Glucose LESS THAN 70 milliGRAM(s)/deciliter  guaiFENesin Oral Liquid (Sugar-Free) 100 milliGRAM(s) Oral every 6 hours PRN Cough  HYDROmorphone  Injectable 0.5 milliGRAM(s) IV Push every 4 hours PRN Severe Pain (7 - 10)  LORazepam   Injectable 0.12 milliGRAM(s) IV Push every 6 hours PRN Anxiety  melatonin 3 milliGRAM(s) Oral at bedtime PRN Insomnia  metoclopramide Injectable 10 milliGRAM(s) IV Push every 8 hours PRN Nausea/vomiting  naloxone Injectable 0.1 milliGRAM(s) IV Push every 3 minutes PRN For ANY of the following changes in patient status:  A. RR LESS THAN 10 breaths per minute, B. Oxygen saturation LESS THAN 90%, C. Sedation score of 6      CAPILLARY BLOOD GLUCOSE      POCT Blood Glucose.: 194 mg/dL (02 Feb 2025 12:29)  POCT Blood Glucose.: 216 mg/dL (02 Feb 2025 08:30)  POCT Blood Glucose.: 190 mg/dL (01 Feb 2025 21:58)  POCT Blood Glucose.: 231 mg/dL (01 Feb 2025 18:03)    I&O's Summary      PHYSICAL EXAM:  Vital Signs Last 24 Hrs  T(C): 36.6 (02 Feb 2025 11:20), Max: 36.9 (01 Feb 2025 21:37)  T(F): 97.9 (02 Feb 2025 11:20), Max: 98.4 (01 Feb 2025 21:37)  HR: 77 (02 Feb 2025 12:51) (73 - 137)  BP: 122/73 (02 Feb 2025 11:20) (122/73 - 152/73)  BP(mean): --  RR: 18 (02 Feb 2025 12:51) (18 - 20)  SpO2: 98% (02 Feb 2025 12:51) (95% - 100%)    Parameters below as of 02 Feb 2025 12:51  Patient On (Oxygen Delivery Method): nasal cannula, high flow  O2 Flow (L/min): 50  O2 Concentration (%): 80  CONSTITUTIONAL: NAD, lethargic and minimally verbal   EYES: PERRLA; conjunctiva and sclera clear  ENMT: Dry mucosa   RESPIRATORY: Slightly tachypneic; lungs are clear anteriorly  CARDIOVASCULAR: Regularly irregular, normal S1 and S2, no murmur/rub/gallop;  ABDOMEN: Nontender to palpation, normoactive bowel sounds, no rebound/guarding; No hepatosplenomegaly  MUSCULOSKELETAL:  No clubbing or cyanosis of digits; no joint swelling or tenderness to palpation  NEUROLOGY: Lethargic, intermittently awake but minimally verbal, follows commands and makes her needs known   SKIN: No rashes; no palpable lesions    LABS:                        11.4   14.79 )-----------( 129      ( 02 Feb 2025 05:48 )             35.6     02-02    142  |  108[H]  |  33[H]  ----------------------------<  187[H]  4.6   |  17[L]  |  0.56    Ca    8.0[L]      02 Feb 2025 05:48  Phos  3.9     02-02  Mg     2.70     02-02    TPro  4.6[L]  /  Alb  2.5[L]  /  TBili  0.8  /  DBili  x   /  AST  38[H]  /  ALT  19  /  AlkPhos  105  02-02          Urinalysis Basic - ( 02 Feb 2025 05:48 )    Color: x / Appearance: x / SG: x / pH: x  Gluc: 187 mg/dL / Ketone: x  / Bili: x / Urobili: x   Blood: x / Protein: x / Nitrite: x   Leuk Esterase: x / RBC: x / WBC x   Sq Epi: x / Non Sq Epi: x / Bacteria: x          RADIOLOGY & ADDITIONAL TESTS:  Results Reviewed:   Imaging Personally Reviewed:  Electrocardiogram Personally Reviewed:    COORDINATION OF CARE:  Care Discussed with Consultants/Other Providers [Y/N]:  Prior or Outpatient Records Reviewed [Y/N]:

## 2025-02-02 NOTE — CONSULT NOTE ADULT - ASSESSMENT
64 year old female with a history of lung neuroendocrine tumor with pulmonary, bone and brain metastases, s/p WBRT in 11/2024, most recently on everolimus, papillary thyroid carcinoma s/p partial thyroidectomy, HTN, HLD presenting with worsening SOB and chest pain c/b RRT for increased WOB, tachycardia, hypertension.    # acute hypoxemic respiratory failure    Recommendations:  - CXR suggests pulmonary congestion  - suspect AHRF during RRT may have been caused by flash pulmonary edema, although pulmonary mets vs infection also a possibility  - agree w/ BIPAP 12/5/80%, titrated during RRT  - wean BIPAP as tolerated  - can consider gentle diuresis if BP tolerates  - During RRT, patient reported to prefer to be comfortable. Recommend continued excellent GOC conversations; patient may benefit from transition to comfort care and/or palliative care follow up  - Appreciate pulmonary recs this admission    Patient is not a MICU candidate at this time. Please reconsult as needed  D/w MICU attending Dr. Panda. Please see attending attestations for final recommendations.     Ottoniel Diallo  Internal Medicine PGY-2

## 2025-02-02 NOTE — RAPID RESPONSE TEAM SUMMARY - NSADDTLFINDINGSRRT_GEN_ALL_CORE
On arrival, VS stable and SpO2 92%. Per staff at bedside,  notified RN of hypoxia on pulse oximetry to 66% with good waveform, which was transient and subsequently resolved prior to RRT arrival. 
On arrival, VS notable for HR 110s, irregular, SpO2 90s, BP wnl, Temp 99.6 rectally, glucose wnl. Primary team and oncology hospitalist at bedside. Per chart review and primary team, patient is actively being treated for cytokine release syndrome, had recently received PO metoprolol, and was ordered for tylenol. CXR recently performed, unchanged from prior with known pulmonary mets. Labs notable for elevated WBC and downtrending lactate. Physical exam notable for wheezing. 
VSS, HR low 100s, BP wnl, glucose wnl. Patient experiencing respiratory distress with accessory muscle use despite maximum HFNC. Diffuse wheezing on exam. Confirmed patient is DNR/DNI. Patient received decadron with Tocilizumab ordered. Primary team and Oncology hospitalist at bedside.

## 2025-02-02 NOTE — CHART NOTE - NSCHARTNOTEFT_GEN_A_CORE
RRT called for brief hypoxia to 66% with good waveform, which was transient and subsequently resolved prior to RRT team arrived. HFNC increased from 50/80% to 60/100%. Patient's oxygen saturation is stable now. After RRT completion, patient's HR fluctuating 120s-140s in rapid afib. BP stable systolic 140s. Patient seen and assessed at bedside. Patient appears to be comfortably resting in bed. Denies any chest pain, shortness of breath, palpitations.  at bedside. IV lopressor 2.5mg x 1 ordered. Uptitrated PO lopressor to 50mg q8. Will inform night ACP to continue monitoring.

## 2025-02-03 NOTE — PROVIDER CONTACT NOTE (OTHER) - REASON
chest pain (cancer relate)
increased work of breathing / removing norebreather
PO Medications
Pt tachycardic to 141.
Requiring more oxygen, especially with ambulation and tachy
Call from tele tech pt had multiple PVCS and tachycardia to 137

## 2025-02-03 NOTE — PROVIDER CONTACT NOTE (OTHER) - DATE AND TIME:
31-Jan-2025 09:00
02-Feb-2025 09:15
29-Jan-2025 08:30
02-Feb-2025 19:02
30-Jan-2025 18:00
03-Feb-2025 11:35

## 2025-02-03 NOTE — PROGRESS NOTE ADULT - AGENT'S NAME
Primary HCP  Hebert Slater ([phone])
Primary HCP: Hebert Slater ([phone])
Primary HCP  Hebert Slater ([phone])

## 2025-02-03 NOTE — PROVIDER CONTACT NOTE (OTHER) - SITUATION
Call from tele tech pt had multiple PVCS and tachycardia to 137
Pt tachycardic to 141.
chest pain
Pt due for PO medication, pt currently on bipap
Requiring more oxygen, especially with ambulation- placed on NRB when walking to and from bathroom. Tachycardic to 140s however, does not meet parameter for carvedilol.

## 2025-02-03 NOTE — PROGRESS NOTE ADULT - PROBLEM SELECTOR PLAN 7
Case reviewed with primary team  Thank you for allowing us to participate in your patient's care. Please page [phone] for any questions/concerns.

## 2025-02-03 NOTE — PROGRESS NOTE ADULT - NS ATTEST RISK PROBLEM GEN_ALL_CORE FT
1. Number and complexity of problems addressed for this patient:    1.1 Moderate (At least 1)  [ ] 1 or more chronic illnesses with exacerbation, progression, or side effects of treatment  [ ] 2 or more stable chronic illnesses  [ ] 1 undiagnosed new problem with uncertain prognosis  [ ] 1 acute illness with systemic symptoms  [ ] 1 acute complicated injury  1.2 High (At least 1)   [ x] 1 or more chronic illnesses with severe exacerbation, progression, or side effects of treatment  [x ] 1 acute or chronic illnesses or injuries that may pose a threat to life or bodily function    2. Amount and/or Complexity of Data that was Reviewed and Analyzed for this case:       Moderate (1 out of 3)       High (2 out of 3)  2.1. (Any combination of 3 of the following)   [x ] Prior External notes were reviewed  [ x] Each test result was reviewed (see "LABS" and "RADIOLOGY & ADDITIONAL STUDIES" above)  [ ] The following tests were ordered and/or reviewed (Only count 1 point for ordering or reviewing a unique test):  	[ ]CBC  	[ ] Chemistry   	[ ] Imaging   	[ ] Other:   [x ] Assessment requiring an independent historian   		Name of historian and relationship: Bedside RN and family   2.2  [ ] Personally review and interpretation of  image or testing   2.3  [x ] Discussion of management or test interpretation with external physician/other qualified health care professional\appropriate source (not separately reported)    3. Risk of Complications and/or Morbidity or Mortality of for this Patient’s Management:  3.1 Moderate risk of morbidity from additional diagnostic testing or treatment (At least 1):   [ ] Prescription drug management  [ ] Decision regarding minor surgery, treatment, or procedure with identified patient or procedure risk factors  [ ] Decision regarding elective major surgery, treatment, or procedure without identified patient or procedure risk factors   [ ] Diagnosis or treatment significantly limited by social determinants of health   [ ] Other:   3.2 High risk of morbidity from additional diagnostic testing or treatment (At least 1):   [ ] Drug therapy requiring intensive monitoring for toxicity   [ ] Decision regarding elective major surgery, treatment, or procedure with identified patient or procedure risk factors   [ ] Decision regarding emergency major surgery, treatment, or procedure   [ ] Decision regarding hospitalization or escalation of hospital-level of care  [x ] Decision to de-escalate care because of poor prognosis   [ ] Decision to proceed or not with artificial nutrition   [x ] Parenteral controlled substance  [ ] Other:

## 2025-02-03 NOTE — PROGRESS NOTE ADULT - PROBLEM SELECTOR PLAN 1
Made comfort care    Worsening metastatic disease noted on CT imaging. Most recently was on everolimus- patient's daughter reports patient is no longer on any kind of treatment. Plan was to follow up with Dr. Marx outpatient to discuss other treatment options once patient returned to the [place]  - s/p Tarlatamab on 1/29 complicated by fever with acute hypoxic resp failure c/w CRS  - s/p Tocilizumab x3 doses. IV solumedrol given. Will continue steroids  - Currently on 100% FIO2 HFNC  - Pulm consulted- continue standing nebs, Guaifensin  - Pall care following. Pt DNR/DNI. Per family, hoping to take patient home with hospice if she improves. At this time prognosis is very poor, not stable enough for home hospice. Will continue treatment for CRS

## 2025-02-03 NOTE — PROGRESS NOTE ADULT - CONVERSATION DETAILS
Spoke with daughter Rachel at bedside. She was tearful as she understands patient's current clinical status and overall guarded prognosis. Rachel states she spoke with patient earlier and patient had expressed to daughter her wish to focus on her comfort at this time. Rachel states her father (patient's ) is aware of patient's wishes as well.  Rachel shares family agree for patient to transition to comfort centric approach of care with no further diagnostic workup at this time. She shares goal for patient to be able to weaned off Bipap to be able to converse with family as well as to be able to eat/ drink which is currently precluded by Bipap. Daughter wishes to defer dilaudid/ morphine gtt but agrees to ATC Dilaudid with PRN doses.     Daughter has updated her siblings and other family members who reside in the [facility]. She is hopeful that family will be able to see patient as they will not be arriving until Friday.   Rachel shares her 3 children (age 20 months, 3 years, and 7 years) will be visiting later. Daughter tearful as she shares patient has a very close relationship with her grandchildren as they live together and wishes that her mother had more time to watch them growing up.  Child Life services reviewed. Daughter declines referral for now but aware that she can ask for referral if interested.     Emotional support provided

## 2025-02-03 NOTE — PROVIDER CONTACT NOTE (OTHER) - RECOMMENDATIONS
ACP notified and made aware
Additional dose of Toci, if need be ativan for anxiety. EKG.
ACP made aware
ACP made aware.
RAPID RESPONSE CALLED
ECG + VS stable

## 2025-02-03 NOTE — PROGRESS NOTE ADULT - PROBLEM SELECTOR PLAN 1
- Patient with lung neuroendocrine tumor with pulmonary, bone and brain metastases, s/p WBRT in 11/2024, most recently on everolimus.   - CT 1/26 - Findings suggestive of bilateral pulmonary metastases, hepatic   metastases, right adrenal gland metastasis as well as metastatic implants   within the subcutaneous tissues of the anterior chest wall/both breasts.  Mediastinal/bilateral hilar adenopathy.  - Follows with Francisco J  - Patient is s/p Tarlatamab on 1/29 during this admission complicated by cytokine release syndrome s/p Toci  - Patient is hospice eligible as no further DMT to be pursued

## 2025-02-03 NOTE — PROGRESS NOTE ADULT - PHONE #
Alternate HCP: Rachel Meehan ([phone])
Alternate HCP Daughter Rachel Termpra 
Alternate HCP Daughter Rachel Termpra

## 2025-02-03 NOTE — PROVIDER CONTACT NOTE (OTHER) - BACKGROUND
63 y/o female with neuroendrocrine tumor with pulmonary, bone and brain mets s/p tarla.   Pt on tele for a-fib.
64 year old female with papillary thyroid cancer, stage IIIA lung neuroendocrine tumor with pulmonary, bone and brain mets  Admitted with acute respiratory failure with hypoxia, pt on bipap
Tarlatamab yesterday, received toci once this AM
admitting dx of respiratory failure with hypoxia.   Stage IIIA lung neuroendocrine tumor with pulmonary, bone, and brain mets.
64 old former smoker 1/2 ppd hx of hn hld papillary thyroid CA

## 2025-02-03 NOTE — PROGRESS NOTE ADULT - PROBLEM SELECTOR PLAN 4
- IV Dilaudid 0.2mg q8 ATC   - Liberate to IV Dilaudid 0.5mg q2 PRN pain/dyspnea   - Start IV Ativan 0.25mg q2 PRN refractory dyspnea/ anxiety/agitation

## 2025-02-03 NOTE — PROGRESS NOTE ADULT - TIME BILLING
Reviewing laboratory data, consultants' recommendations, documentation in [place], performing medically appropriate examinations/evaluations, discussion with patient/family/RN/ACP/Residents and interdisciplinary staff (such as , social workers, etc), counseling patient/family/care giver, ordering medically appropriate medication, tests, or procedures.
Reviewing laboratory data, consultants' recommendations, documentation in [place], performing medically appropriate examinations/evaluations, discussion with patient/family/RN/ACP/Residents and interdisciplinary staff (such as , social workers, etc), counseling patient/family/care giver, ordering medically appropriate medication, tests, or procedures.
Reviewing laboratory data, consultants' recommendations, documentation in [place], performing medically appropriate examinations/evaluations, discussion with patient/family/RN/ACP/Residents and interdisciplinary staff (such as , social workers, etc), counseling patient/family/care giver, ordering medically appropriate medication, tests, or procedures.
Reviewing laboratory data, consultants' recommendations, documentation in [place], performing medically appropriate examinations/evaluations, discussion with patient/family/RN/ACP/Residents and interdisciplinary staff (such as , social workers, etc), counseling patient/family/care giver, ordering medically appropriate medication, tests, or procedures.
71 minutes spent on total encounter; more than 50% of the visit was spent counseling and / or coordinating care by the attending physician.  The necessity of the time spent during the encounter on this date of service was due to:     review of laboratory data, radiology results, consultants' recommendations, documentation in [place], discussion with patient/ACP and interdisciplinary staff (such as , social workers, etc). Interventions were performed as documented above.    GOC addressed multiple times, d/w outpt oncologist, patient ultimately made comfort care.
Reviewing laboratory data, consultants' recommendations, documentation in [place], performing medically appropriate examinations/evaluations, discussion with patient/family/RN/ACP/Residents and interdisciplinary staff (such as , social workers, etc), counseling patient/family/care giver, ordering medically appropriate medication, tests, or procedures.
Time spent for extensive review of the physical chart, electronic medical record, and documentation to obtain collateral information including but not limited to:  [x] Inpatient records (ED, H&P, primary team, and consultants if applicable, care coordination)  [x] Inpatient values/results (biomarkers, immunoassays, imaging, and microbiology results)  [x] Current or proposed treatment plans  [x] Pharmacotherapy review  [x] Discussion and coordinating care with primary team and interdisciplinary staff and floor staff  [x] Discussion including counseling/ education with the patient, surrogate decision maker, or family
Reviewing laboratory data, consultants' recommendations, documentation in [place], performing medically appropriate examinations/evaluations, discussion with patient/family/RN/ACP/Residents and interdisciplinary staff (such as , social workers, etc), counseling patient/family/care giver, ordering medically appropriate medication, tests, or procedures.
Reviewing laboratory data, consultants' recommendations, documentation in [place], performing medically appropriate examinations/evaluations, discussion with patient/family/RN/ACP/Residents and interdisciplinary staff (such as , social workers, etc), counseling patient/family/care giver, ordering medically appropriate medication, tests, or procedures.

## 2025-02-03 NOTE — PROGRESS NOTE ADULT - PROBLEM SELECTOR PLAN 3
- Home regimen: Oxy ER 20mg BID, Oxy IR 10mg PRN   - IV Dilaudid 0.2mg q8 ATC   - Liberate to IV Dilaudid 0.5mg q2 PRN pain/dyspnea   - Bowel regimen while on opiates.

## 2025-02-03 NOTE — PROVIDER CONTACT NOTE (OTHER) - ACTION/TREATMENT ORDERED:
clara immediately came to bedside to assess pt   clara suggested pain meds - by that time the pt said pain subsided it is cancer related
Additional dose of Toci, if need be ativan for anxiety. EKG.
As per ACP, hold PO medication at this time
ACP made aware.   RN showed ACP tele strip. ACP at bedside to evaluation patient. ACP stated no new orders at this time.
ACP made aware. ACP went to bedside will order metoprolol 2.5mg IVP.

## 2025-02-03 NOTE — PROGRESS NOTE ADULT - SUBJECTIVE AND OBJECTIVE BOX
Ameya Nunes MD  Academic Hospitalist  Pager [phone]  Email: [email or web address]  Available on [employer] Teams        PROGRESS NOTE:     Patient is a 64y old  Female who presents with a chief complaint of Shortness of breath and chest pain (02 Feb 2025 22:42)      SUBJECTIVE / OVERNIGHT EVENTS:  Patient seen and examined this morning. Patient intermittently desatting this morning despite NIV. Discussed with patient,  and daughter (who is a nurse). GOC addressed, explained that any further escalation in care is futile, patient unlikely to see much improvement. Family requested to discuss with outpatient oncologist.     Subsequently discussed with Dr. Marx, outpatient oncologist who discussed with daughter and . Patient ultimately made comfort care.     ADDITIONAL REVIEW OF SYSTEMS:  No f/c, +shortness of breath    MEDICATIONS  (STANDING):  atorvastatin 10 milliGRAM(s) Oral at bedtime  chlorhexidine 2% Cloths 1 Application(s) Topical daily  dextrose 5%. 1000 milliLiter(s) (50 mL/Hr) IV Continuous <Continuous>  dextrose 5%. 1000 milliLiter(s) (100 mL/Hr) IV Continuous <Continuous>  dextrose 50% Injectable 25 Gram(s) IV Push once  dextrose 50% Injectable 12.5 Gram(s) IV Push once  dextrose 50% Injectable 25 Gram(s) IV Push once  enoxaparin Injectable 40 milliGRAM(s) SubCutaneous every 12 hours  glucagon  Injectable 1 milliGRAM(s) IntraMuscular once  HYDROmorphone  Injectable 0.2 milliGRAM(s) IV Push every 8 hours  influenza  Vaccine (HIGH DOSE) 0.5 milliLiter(s) IntraMuscular once  insulin lispro (ADMELOG) corrective regimen sliding scale   SubCutaneous three times a day before meals  insulin lispro (ADMELOG) corrective regimen sliding scale   SubCutaneous at bedtime  levalbuterol Inhalation 0.63 milliGRAM(s) Inhalation every 6 hours  losartan 50 milliGRAM(s) Oral daily  methylPREDNISolone sodium succinate Injectable 40 milliGRAM(s) IV Push every 12 hours  metoprolol tartrate 50 milliGRAM(s) Oral every 8 hours  mirtazapine 7.5 milliGRAM(s) Oral at bedtime  montelukast 10 milliGRAM(s) Oral daily  multivitamin 1 Tablet(s) Oral daily  nystatin    Suspension 916833 Unit(s) Oral every 6 hours  pantoprazole    Tablet 40 milliGRAM(s) Oral two times a day  piperacillin/tazobactam IVPB.. 3.375 Gram(s) IV Intermittent every 8 hours  polyethylene glycol 3350 17 Gram(s) Oral two times a day  senna 2 Tablet(s) Oral at bedtime  sodium chloride 0.9%. 1000 milliLiter(s) (100 mL/Hr) IV Continuous <Continuous>  trimethoprim  160 mG/sulfamethoxazole 800 mG 1 Tablet(s) Oral <User Schedule>    MEDICATIONS  (PRN):  dextrose Oral Gel 15 Gram(s) Oral once PRN Blood Glucose LESS THAN 70 milliGRAM(s)/deciliter  guaiFENesin Oral Liquid (Sugar-Free) 100 milliGRAM(s) Oral every 6 hours PRN Cough  HYDROmorphone  Injectable 0.5 milliGRAM(s) IV Push every 2 hours PRN pain/dyspnea  LORazepam   Injectable 0.25 milliGRAM(s) IV Push every 2 hours PRN Anxiety/agitation/ refractory dyspnea  melatonin 3 milliGRAM(s) Oral at bedtime PRN Insomnia  metoclopramide Injectable 10 milliGRAM(s) IV Push every 8 hours PRN Nausea/vomiting  naloxone Injectable 0.1 milliGRAM(s) IV Push every 3 minutes PRN For ANY of the following changes in patient status:  A. RR LESS THAN 10 breaths per minute, B. Oxygen saturation LESS THAN 90%, C. Sedation score of 6      CAPILLARY BLOOD GLUCOSE      POCT Blood Glucose.: 146 mg/dL (03 Feb 2025 12:19)  POCT Blood Glucose.: 214 mg/dL (03 Feb 2025 08:24)  POCT Blood Glucose.: 158 mg/dL (02 Feb 2025 22:56)  POCT Blood Glucose.: 222 mg/dL (02 Feb 2025 20:47)  POCT Blood Glucose.: 217 mg/dL (02 Feb 2025 17:51)    I&O's Summary    03 Feb 2025 07:01  -  03 Feb 2025 16:37  --------------------------------------------------------  IN: 0 mL / OUT: 300 mL / NET: -300 mL        PHYSICAL EXAM:  Vital Signs Last 24 Hrs  T(C): 36.3 (03 Feb 2025 13:00), Max: 36.7 (02 Feb 2025 18:00)  T(F): 97.4 (03 Feb 2025 13:00), Max: 98.1 (02 Feb 2025 18:00)  HR: 85 (03 Feb 2025 13:00) (76 - 141)  BP: 120/65 (03 Feb 2025 13:00) (101/66 - 137/83)  BP(mean): --  RR: 18 (03 Feb 2025 13:00) (16 - 21)  SpO2: 98% (03 Feb 2025 13:00) (66% - 100%)    Parameters below as of 03 Feb 2025 13:00  Patient On (Oxygen Delivery Method): BiPAP/CPAP        CONSTITUTIONAL: sickly appearing, in bed, under distress at times  RESPIRATORY: poor inspiratory effort, tachypneic (though seemed better after opioids)  CARDIOVASCULAR: No visible JVD, No lower extremity edema; S1S2  ABDOMEN: Not guarding, does not appear distended, BS+  MUSCLOSKELETAL: no clubbing or cyanosis of digits; no joint swelling   PSYCH: lethargic    LABS:                        11.0   18.11 )-----------( 128      ( 03 Feb 2025 04:54 )             34.3     02-03    145  |  108[H]  |  28[H]  ----------------------------<  176[H]  3.6   |  22  |  0.57    Ca    8.1[L]      03 Feb 2025 04:54  Phos  4.0     02-03  Mg     2.60     02-03    TPro  4.4[L]  /  Alb  2.7[L]  /  TBili  0.9  /  DBili  x   /  AST  34[H]  /  ALT  23  /  AlkPhos  124[H]  02-03          Urinalysis Basic - ( 03 Feb 2025 04:54 )    Color: x / Appearance: x / SG: x / pH: x  Gluc: 176 mg/dL / Ketone: x  / Bili: x / Urobili: x   Blood: x / Protein: x / Nitrite: x   Leuk Esterase: x / RBC: x / WBC x   Sq Epi: x / Non Sq Epi: x / Bacteria: x          RADIOLOGY & ADDITIONAL TESTS:  Results Reviewed:   Imaging Personally Reviewed:  Electrocardiogram Personally Reviewed:    COORDINATION OF CARE:  Care Discussed with Consultants/Other Providers [Y/N]: Discussed with Dr. Marx, outpatient oncologist  Prior or Outpatient Records Reviewed [Y/N]:

## 2025-02-03 NOTE — PROGRESS NOTE ADULT - SUBJECTIVE AND OBJECTIVE BOX
Date of Service  : 2/3/2025  Indication for Geriatrics and Palliative Care Services: goals of care / symptom management     INTERVAL HPI/OVERNIGHT EVENTS:  Patient with rapid response yesterday evening.     SUBJECTIVE AND OBJECTIVE:  Patient seen and examined with daughter at bedside. Patient resting with Bipap on during encounter     Allergies    No Known Allergies    Intolerances    MEDICATIONS  (STANDING):  chlorhexidine 2% Cloths 1 Application(s) Topical daily  dextrose 5%. 1000 milliLiter(s) (50 mL/Hr) IV Continuous <Continuous>  dextrose 5%. 1000 milliLiter(s) (100 mL/Hr) IV Continuous <Continuous>  glucagon  Injectable 1 milliGRAM(s) IntraMuscular once  HYDROmorphone  Injectable 0.2 milliGRAM(s) IV Push every 8 hours  influenza  Vaccine (HIGH DOSE) 0.5 milliLiter(s) IntraMuscular once  levalbuterol Inhalation 0.63 milliGRAM(s) Inhalation every 6 hours  methylPREDNISolone sodium succinate Injectable 40 milliGRAM(s) IV Push every 12 hours  nystatin    Suspension 205103 Unit(s) Oral every 6 hours  piperacillin/tazobactam IVPB.. 3.375 Gram(s) IV Intermittent every 8 hours  sodium chloride 0.9%. 1000 milliLiter(s) (100 mL/Hr) IV Continuous <Continuous>  trimethoprim  160 mG/sulfamethoxazole 800 mG 1 Tablet(s) Oral <User Schedule>    MEDICATIONS  (PRN):  bisacodyl Suppository 10 milliGRAM(s) Rectal daily PRN Constipation  HYDROmorphone  Injectable 0.5 milliGRAM(s) IV Push every 2 hours PRN pain/dyspnea  LORazepam   Injectable 0.25 milliGRAM(s) IV Push every 2 hours PRN Anxiety/agitation/ refractory dyspnea  metoclopramide Injectable 10 milliGRAM(s) IV Push every 8 hours PRN Nausea/vomiting  naloxone Injectable 0.1 milliGRAM(s) IV Push every 3 minutes PRN For ANY of the following changes in patient status:  A. RR LESS THAN 10 breaths per minute, B. Oxygen saturation LESS THAN 90%, C. Sedation score of 6      ITEMS UNCHECKED ARE NOT PRESENT    PRESENT SYMPTOMS: [x ]Unable to self-report due to clinical status- see [ ] CPOT [ x] PAINADS [ x] RDOS  Source if other than patient:  [ ]Family   [ ]Team     Pain: [ ]yes [ ]no  QOL impact -   Location -     Aggravating factors -  Quality -   Radiation -   Timing-   Severity (0-10 scale):   Minimal acceptable level / Pain goal (0-10 scale):     Dyspnea:                           [ ]Mild [ ]Moderate [ ]Severe  Anxiety:                             [ ]Mild [ ]Moderate [ ]Severe  Agitation:                          [ ]Mild [ ]Moderate [ ]Severe  Fatigue:                             [ ]Mild [ ]Moderate [ ]Severe  Nausea:                             [ ]Mild [ ]Moderate [ ]Severe  Loss of appetite:              [ ]Mild [ ]Moderate [ ]Severe  Constipation:                   [ ]Mild [ ]Moderate [ ]Severe  Diarrhea:                          [ ]Mild [ ]Moderate [ ]Severe      CPOT:    https://www.[facility].org/getattachment/[email or web address]/Critical-Care-Pain-Observation-Tool-(CPOT)    PCSSQ[Palliative Care Spiritual Screening Question]   Severity (0-10):  Score of 4 or > indicate consideration of Chaplaincy referral.  Chaplaincy Referral: [ ] yes [ ] refused [x ] following [ ] deferred    Caregiver [place]? : [ ] yes [ ] no [ ] Declined [x ] Deferred              Social work referral [ ] Patient & Family Centered Care Referral [ ]     Anticipatory Grief present?:  [x ] yes [ ] no  [ ] Deferred                  Social work referral [x ] Chaplaincy Referral[x ]    Other Symptoms:  [ ]All other review of systems negative   [ x] Unable to obtain due to poor mentation     PHYSICAL EXAM:  Vital Signs Last 24 Hrs  T(C): 36.3 (03 Feb 2025 13:00), Max: 36.7 (03 Feb 2025 09:00)  T(F): 97.4 (03 Feb 2025 13:00), Max: 98 (03 Feb 2025 09:00)  HR: 85 (03 Feb 2025 13:00) (83 - 124)  BP: 120/65 (03 Feb 2025 13:00) (106/66 - 123/81)  BP(mean): --  RR: 18 (03 Feb 2025 13:00) (16 - 20)  SpO2: 98% (03 Feb 2025 13:00) (92% - 100%)    Parameters below as of 03 Feb 2025 13:00  Patient On (Oxygen Delivery Method): BiPAP/CPAP      GENERAL: Resting   [ ]Alert  [ ]Oriented   [ ]Lethargic  [ ]Cachexia  [ ]Unarousable    Behavioral:   [ ] Anxiety  [ ] Delirium [ ] Agitation [ ] Calm  [ ] Other  HEENT:  [x ]Normal  [ ] Temporal Wasting  [ ]Dry mouth   [ ]ET Tube/Trach  [ ]Oral lesions  [ ] Mucositis  PULMONARY: normal respiratory effort, no accessory muscle use   [ ]Clear [ ]Tachypnea  [ ]Audible excessive secretions   [ ]Rhonchi        [ ]Right [ ]Left [ ]Bilateral  [ ]Crackles        [ ]Right [ ]Left [ ]Bilateral  [ ]Wheezing     [ ]Right [ ]Left [ ]Bilateral  [ ]Diminished breath sounds [ ]right [ ]left [ ]bilateral  CARDIOVASCULAR:    [x ]Regular [ ]Irregular [ ]Tachy  [ ]Anam [ ]Murmur [ ]Other  GASTROINTESTINAL:  [x ]Soft  [ ]Distended   [ ]+BS  [x ]Non tender [ ]Tender  [ ]PEG [ ]OGT/ NGT  Last BM: 1/30  GENITOURINARY:  [x ]Normal [ ] Incontinent   [ ]Oliguria/Anuria   [ ]Fung  MUSCULOSKELETAL:   [ ]Normal   [ ]Weakness  [x ]Bed/Wheelchair bound [ ]Edema  [  ] amputation  [  ] contraction  NEUROLOGIC:   [x ]No focal deficits  [ ]Cognitive impairment  [ ]Dysphagia [ ]Dysarthria [ ]Paresis [ ]Other   SKIN: See Nursing Skin Assessment for further details  [ x]Normal    [ ]Rash  [ ]Pressure ulcer(s)       Present on admission [ ]y [ ]n   [  ]  Wound    [  ] hyperpigmentation    CRITICAL CARE:  [ ]Shock Present  [ ]Septic [ ]Cardiogenic [ ]Neurologic [ ]Hypovolemic  [ ]Vasopressors [ ]Inotropes  [ x]Respiratory failure present [ ]Mechanical Ventilation [ x]Non-invasive ventilatory support [ ]High-Flow   [x ]Acute  [ ]Chronic [x ]Hypoxic  [ ]Hypercarbic [ ]Other  [ ]Other organ failure     LABS:  reviewed                                                             11.0   18.11 )-----------( 128      ( 03 Feb 2025 04:54 )             34.3       02-03    145  |  108[H]  |  28[H]  ----------------------------<  176[H]  3.6   |  22  |  0.57    Ca    8.1[L]      03 Feb 2025 04:54  Phos  4.0     02-03  Mg     2.60     02-03    TPro  4.4[L]  /  Alb  2.7[L]  /  TBili  0.9  /  DBili  x   /  AST  34[H]  /  ALT  23  /  AlkPhos  124[H]  02-03        RADIOLOGY & ADDITIONAL STUDIES: reviewed   CXRAY 2/2  FINDINGS:  Tracheostomy.  The heart size is not accurately assessed in this projection.  Increased bilateral patchy airspace opacities. Similar bilateral nodular   opacities consistent with metastases. Bilateral chain sutures.  No pneumothorax. Small right pleural effusion.  No acute osseous abnormalities.    IMPRESSION:  Increased bilateral patchy airspace opacities and new small right pleural   effusion.    Protein Calorie Malnutrition Present: [ ]mild [ ]moderate [ x]severe [ ]underweight [ ]morbid obesity  https://www.andeal.org/vault/2440/web/files/ONC/Table_Clinical%20Characteristics%20to%20Document%20Malnutrition-White%20JV%20et%20al%202012.pdf    Height (cm): 147.32 (11-04-24 @ 16:00), 144.8 (10-19-24 @ 14:12), 144.8 (03-10-24 @ 23:45)  Weight (kg): 40.8 (01-26-25 @ 09:21), 45.1 (11-19-24 @ 16:00), 45.4 (10-19-24 @ 14:12)  BMI (kg/m2): 18.8 (01-26-25 @ 09:21), 20.8 (11-19-24 @ 16:00), 20.9 (11-04-24 @ 16:00)    [x ]PPSV2 < or = 30%  [ ]significant weight loss [ ]poor nutritional intake [ ]anasarca   [ ]Artificial Nutrition    REFERRALS:   [ ]Chaplaincy  [ ]Hospice  [ ]Child Life  [ ]Social Work  [x ]Case management [ ]Holistic Therapy

## 2025-02-04 NOTE — PROGRESS NOTE ADULT - PROBLEM SELECTOR PROBLEM 4
Counseling regarding advanced care planning and goals of care
High serum lactate
High serum lactate
Counseling regarding advanced care planning and goals of care
Fall
High serum lactate
Fall
Drug-induced hyperglycemia
Fall
Dyspnea
Fall
Fall
Severe protein-calorie malnutrition
Dyspnea

## 2025-02-04 NOTE — PROGRESS NOTE ADULT - PROBLEM SELECTOR PLAN 6
Thrush noted on exam. Patient states it has been present for the past 2 months  - Start nystatin swish and spit q6h
BP at goal for patient's age  - Resume home losartan and carvedilol with holding parameters
Thrush noted on exam. Patient states it has been present for the past 2 months  - Start nystatin swish and spit q6h
Case reviewed with primary team  Thank you for allowing us to participate in your patient's care. Please page [phone] for any questions/concerns.
- MOLST completed by primary team to reflect: DNR/DNI. Trial of NIV. No feeding tube.   - Primary HCP:   Hebert Slater ([phone]); Alternate HCP: Daughter Rachel Meehan ([phone])  - 2/3 - Patient now comfort measures only with no further diagnostic workup.   See GOC note  16 minutes spent on advanced care planning/ goals of care
BP at goal for patient's age  - Resume home losartan and carvedilol with holding parameters
Thrush noted on exam. Patient states it has been present for the past 2 months  - Start nystatin swish and spit q6h
Nutrition recommendations appreciated.

## 2025-02-04 NOTE — PROGRESS NOTE ADULT - PROVIDER SPECIALTY LIST ADULT
Cardiology
Cardiology
Hospitalist
Cardiology
Hospitalist
Palliative Care
Hospitalist
Hospitalist
Palliative Care
Palliative Care

## 2025-02-04 NOTE — PROGRESS NOTE ADULT - PROBLEM SELECTOR PROBLEM 6
Essential hypertension
Essential hypertension
Oral thrush
Essential hypertension
Essential hypertension
Encounter for palliative care
Essential hypertension
Essential hypertension
Counseling regarding advanced care planning and goals of care
Oral thrush
Oral thrush
Severe protein-calorie malnutrition

## 2025-02-04 NOTE — PROGRESS NOTE ADULT - PROBLEM SELECTOR PLAN 8
Case reviewed with primary team  Thank you for allowing us to participate in your patient's care. Please page [phone] for any questions/concerns. Case reviewed with primary team and bedside RN   Thank you for allowing us to participate in your patient's care. Please page [phone] for any questions/concerns.

## 2025-02-04 NOTE — PROGRESS NOTE ADULT - PROBLEM SELECTOR PROBLEM 7
Essential hypertension
Hyperlipidemia
Essential hypertension
Essential hypertension
Hyperlipidemia
Hyperlipidemia
Encounter for palliative care
Counseling regarding advanced care planning and goals of care

## 2025-02-04 NOTE — PROGRESS NOTE ADULT - PROBLEM SELECTOR PLAN 3
- Home regimen: Oxy ER 20mg BID, Oxy IR 10mg PRN   - IV Dilaudid 0.2mg q8 ATC   - Liberate to IV Dilaudid 0.5mg q2 PRN pain/dyspnea   - Bowel regimen while on opiates. - Home regimen: Oxy ER 20mg BID, Oxy IR 10mg PRN   - Increase to IV Dilaudid 0.5mg q6 ATC   - IV Dilaudid 0.5mg q2 PRN pain/dyspnea   - Bowel regimen while on opiates.

## 2025-02-04 NOTE — PROGRESS NOTE ADULT - ADVANCED CARE PLANNING
Voluntary discussion occurred addressing advanced care planning

## 2025-02-04 NOTE — PROGRESS NOTE ADULT - CONVERSATION/DISCUSSION
Diagnosis/Prognosis/MOLST Discussed/Treatment Options/Child Life Referral (ZAY,NIRANJAN)
Diagnosis/Treatment Options
Diagnosis/Prognosis/MOLST Discussed/Treatment Options
Diagnosis/Treatment Options

## 2025-02-04 NOTE — PROGRESS NOTE ADULT - SUBJECTIVE AND OBJECTIVE BOX
Date of Service  : 2/4/2025  Indication for Geriatrics and Palliative Care Services: goals of care / symptom management     INTERVAL HPI/OVERNIGHT EVENTS:  In past 24 hours, received IV Dilaudid 0.5mg x4 (in addition to 3 doses of IV Dilaudid 0.2mg q8 ATC doses) , and IV Ativan 0.25mg x2     SUBJECTIVE AND OBJECTIVE:  Patient seen and examined with daughter Rachel and family at bedside. Patient resting with Bipap on during encounter   Per daughter, patient has been complaining of constipation and becomes very uncomfortable when attempting to have bowel movement but unable to   She also notes that patient becomes dyspneic on attempts to be off bipap.     Allergies    No Known Allergies    Intolerances    MEDICATIONS  (STANDING):  chlorhexidine 2% Cloths 1 Application(s) Topical daily  dextrose 5%. 1000 milliLiter(s) (50 mL/Hr) IV Continuous <Continuous>  dextrose 5%. 1000 milliLiter(s) (100 mL/Hr) IV Continuous <Continuous>  glucagon  Injectable 1 milliGRAM(s) IntraMuscular once  HYDROmorphone  Injectable 0.5 milliGRAM(s) IV Push every 6 hours  influenza  Vaccine (HIGH DOSE) 0.5 milliLiter(s) IntraMuscular once  levalbuterol Inhalation 0.63 milliGRAM(s) Inhalation every 6 hours  methylPREDNISolone sodium succinate Injectable 30 milliGRAM(s) IV Push once  nystatin    Suspension 359019 Unit(s) Oral every 6 hours  piperacillin/tazobactam IVPB.. 3.375 Gram(s) IV Intermittent every 8 hours  sodium chloride 0.9%. 1000 milliLiter(s) (100 mL/Hr) IV Continuous <Continuous>  trimethoprim  160 mG/sulfamethoxazole 800 mG 1 Tablet(s) Oral <User Schedule>    MEDICATIONS  (PRN):  bisacodyl Suppository 10 milliGRAM(s) Rectal daily PRN Constipation  HYDROmorphone  Injectable 0.5 milliGRAM(s) IV Push every 2 hours PRN pain/dyspnea  LORazepam   Injectable 0.25 milliGRAM(s) IV Push every 2 hours PRN Anxiety/agitation/ refractory dyspnea  metoclopramide Injectable 10 milliGRAM(s) IV Push every 8 hours PRN Nausea/vomiting  naloxone Injectable 0.1 milliGRAM(s) IV Push every 3 minutes PRN For ANY of the following changes in patient status:  A. RR LESS THAN 10 breaths per minute, B. Oxygen saturation LESS THAN 90%, C. Sedation score of 6        ITEMS UNCHECKED ARE NOT PRESENT    PRESENT SYMPTOMS: [x ]Unable to self-report due to clinical status- see [ ] CPOT [ x] PAINADS [ x] RDOS  Source if other than patient:  [ ]Family   [ ]Team     Pain: [ ]yes [ ]no  QOL impact -   Location -     Aggravating factors -  Quality -   Radiation -   Timing-   Severity (0-10 scale):   Minimal acceptable level / Pain goal (0-10 scale):     Dyspnea:                           [ ]Mild [ ]Moderate [ ]Severe  Anxiety:                             [ ]Mild [ ]Moderate [ ]Severe  Agitation:                          [ ]Mild [ ]Moderate [ ]Severe  Fatigue:                             [ ]Mild [ ]Moderate [ ]Severe  Nausea:                             [ ]Mild [ ]Moderate [ ]Severe  Loss of appetite:              [ ]Mild [ ]Moderate [ ]Severe  Constipation:                   [ ]Mild [ ]Moderate [ ]Severe  Diarrhea:                          [ ]Mild [ ]Moderate [ ]Severe      CPOT:    https://www.[email or web address].org/getattachment/[email or web address]/Critical-Care-Pain-Observation-Tool-(CPOT)    PCSSQ[Palliative Care Spiritual Screening Question]   Severity (0-10):  Score of 4 or > indicate consideration of Chaplaincy referral.  Chaplaincy Referral: [ ] yes [ ] refused [x ] following [ ] deferred    Caregiver [place]? : [ ] yes [ ] no [ ] Declined [x ] Deferred              Social work referral [ ] Patient & Family Centered Care Referral [ ]     Anticipatory Grief present?:  [x ] yes [ ] no  [ ] Deferred                  Social work referral [x ] Chaplaincy Referral[x ]    Other Symptoms:  [ ]All other review of systems negative   [ x] Unable to obtain due to poor mentation     PHYSICAL EXAM:  Vital Signs Last 24 Hrs  T(C): --  T(F): --  HR: 90 (04 Feb 2025 11:17) (82 - 98)  BP: --  BP(mean): --  RR: --  SpO2: 96% (04 Feb 2025 11:17) (96% - 98%)    Parameters below as of 04 Feb 2025 09:52  Patient On (Oxygen Delivery Method): BiPAP/CPAP        GENERAL: Resting   [ ]Alert  [ ]Oriented   [ ]Lethargic  [ ]Cachexia  [ ]Unarousable    Behavioral:   [ ] Anxiety  [ ] Delirium [ ] Agitation [x ] Calm  [ ] Other  HEENT:  [x ]Normal  [ ] Temporal Wasting  [ ]Dry mouth   [ ]ET Tube/Trach  [ ]Oral lesions  [ ] Mucositis  PULMONARY: normal respiratory effort, no accessory muscle use   [ ]Clear [ ]Tachypnea  [ ]Audible excessive secretions   [ ]Rhonchi        [ ]Right [ ]Left [ ]Bilateral  [ ]Crackles        [ ]Right [ ]Left [ ]Bilateral  [ ]Wheezing     [ ]Right [ ]Left [ ]Bilateral  [ ]Diminished breath sounds [ ]right [ ]left [ ]bilateral  CARDIOVASCULAR:    [x ]Regular [ ]Irregular [ ]Tachy  [ ]Anam [ ]Murmur [ ]Other  GASTROINTESTINAL:  [x ]Soft  [ ]Distended   [ ]+BS  [x ]Non tender [ ]Tender  [ ]PEG [ ]OGT/ NGT  Last BM: 1/30  GENITOURINARY:  [x ]Normal [ ] Incontinent   [ ]Oliguria/Anuria   [ ]Fung  MUSCULOSKELETAL:   [ ]Normal   [ ]Weakness  [x ]Bed/Wheelchair bound [ ]Edema  [  ] amputation  [  ] contraction  NEUROLOGIC:   [x ]No focal deficits  [ ]Cognitive impairment  [ ]Dysphagia [ ]Dysarthria [ ]Paresis [ ]Other   SKIN: See Nursing Skin Assessment for further details  [ x]Normal    [ ]Rash  [ ]Pressure ulcer(s)       Present on admission [ ]y [ ]n   [  ]  Wound    [  ] hyperpigmentation    CRITICAL CARE:  [ ]Shock Present  [ ]Septic [ ]Cardiogenic [ ]Neurologic [ ]Hypovolemic  [ ]Vasopressors [ ]Inotropes  [ x]Respiratory failure present [ ]Mechanical Ventilation [ x]Non-invasive ventilatory support [ ]High-Flow   [x ]Acute  [ ]Chronic [x ]Hypoxic  [ ]Hypercarbic [ ]Other  [ ]Other organ failure     LABS:  reviewed                                                             11.0   18.11 )-----------( 128      ( 03 Feb 2025 04:54 )             34.3       02-03    145  |  108[H]  |  28[H]  ----------------------------<  176[H]  3.6   |  22  |  0.57    Ca    8.1[L]      03 Feb 2025 04:54  Phos  4.0     02-03  Mg     2.60     02-03    TPro  4.4[L]  /  Alb  2.7[L]  /  TBili  0.9  /  DBili  x   /  AST  34[H]  /  ALT  23  /  AlkPhos  124[H]  02-03        RADIOLOGY & ADDITIONAL STUDIES: reviewed   CXRAY 2/2  FINDINGS:  Tracheostomy.  The heart size is not accurately assessed in this projection.  Increased bilateral patchy airspace opacities. Similar bilateral nodular   opacities consistent with metastases. Bilateral chain sutures.  No pneumothorax. Small right pleural effusion.  No acute osseous abnormalities.    IMPRESSION:  Increased bilateral patchy airspace opacities and new small right pleural   effusion.    Protein Calorie Malnutrition Present: [ ]mild [ ]moderate [ x]severe [ ]underweight [ ]morbid obesity  https://www.andeal.org/vault/2440/web/files/ONC/Table_Clinical%20Characteristics%20to%20Document%20Malnutrition-White%20JV%20et%20al%202012.pdf    Height (cm): 147.32 (11-04-24 @ 16:00), 144.8 (10-19-24 @ 14:12), 144.8 (03-10-24 @ 23:45)  Weight (kg): 40.8 (01-26-25 @ 09:21), 45.1 (11-19-24 @ 16:00), 45.4 (10-19-24 @ 14:12)  BMI (kg/m2): 18.8 (01-26-25 @ 09:21), 20.8 (11-19-24 @ 16:00), 20.9 (11-04-24 @ 16:00)    [x ]PPSV2 < or = 30%  [ ]significant weight loss [ ]poor nutritional intake [ ]anasarca   [ ]Artificial Nutrition    REFERRALS:   [ ]Chaplaincy  [ ]Hospice  [ ]Child Life  [ ]Social Work  [x ]Case management [ ]Holistic Therapy

## 2025-02-04 NOTE — PROGRESS NOTE ADULT - PROBLEM SELECTOR PLAN 2
Chest pain is likely due to worsening metastatic disease noted on CT imaging of the chest and abdomen/pelvis  - CT noted "The right cardiophrenic angle lymph node is also extending through the intercostal space into the right anterior chest wall," and "metastatic implants within the subcutaneous tissues of the anterior chest wall/both breasts."  - Imaging is negative for PE or other acute etiologies to explain patient's pain  - EKG reviewed without ischemic changes, troponin x2 without significant change  - Patient reports adequate pain relief with her home regimen of oxycodone and Oxycontin, but does not take oxycodone overnight  - Will start oxycodone 10mg q3h PRN for mod-severe pain, and continue home Oxycontin 20mg q12h  - Palliative care consulted for help with pain management  - Patient not on bowel regimen at home, 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
Chest pain is likely due to worsening metastatic disease noted on CT imaging of the chest and abdomen/pelvis  - CT noted "The right cardiophrenic angle lymph node is also extending through the intercostal space into the right anterior chest wall," and "metastatic implants within the subcutaneous tissues of the anterior chest wall/both breasts."  - Imaging is negative for PE or other acute etiologies to explain patient's pain  - EKG reviewed without ischemic changes, troponin x2 without significant change  - Palliative care consulted for help with pain management  - Bowel regimen   - Narcan ordered PRN
Worsening metastatic disease noted on CT imaging. Most recently was on everolimus- patient's daughter reports patient is no longer on any kind of treatment. Plan was to follow up with Dr. Marx outpatient to discuss other treatment options once [place] returned to the [place]  - C/w dexamethasone 4mg qd- patient did not tolerate SE of increased dose of 4mg BID  - C/w Protonix for GI ppx  - Currently on supplemental 2L O2 for O2 sat of 91-92% on RA- will need to check ambulatory O2 saturation to assess need for home oxygen  - Patient appears cachectic with severe protein-calorie malnutrition- nutrition consulted  - Discussed with oncology- plan to start Tarlatamab inpatient
Chest pain is likely due to worsening metastatic disease noted on CT imaging of the chest and abdomen/pelvis  - CT noted "The right cardiophrenic angle lymph node is also extending through the intercostal space into the right anterior chest wall," and "metastatic implants within the subcutaneous tissues of the anterior chest wall/both breasts."  - Imaging is negative for PE or other acute etiologies to explain patient's pain  - EKG reviewed without ischemic changes, troponin x2 without significant change  - Palliative care consulted for help with pain management  - Bowel regimen   - Narcan ordered PRN
Chest pain is likely due to worsening metastatic disease noted on CT imaging of the chest and abdomen/pelvis  - CT noted "The right cardiophrenic angle lymph node is also extending through the intercostal space into the right anterior chest wall," and "metastatic implants within the subcutaneous tissues of the anterior chest wall/both breasts."  - Imaging is negative for PE or other acute etiologies to explain patient's pain  - EKG reviewed without ischemic changes, troponin x2 without significant change  - Palliative care consulted for help with pain management  - Bowel regimen   - Narcan ordered PRN
- s/p Tarlatamab on 1/29 during this admission complicated by cytokine release syndrome s/p Toci  - CXRAY 1/30 - No interval change in the multiple nodular opacities consistent with metastases.Chain sutures in the bilateral lungs from past thoracotomies.The heart is not enlarged.No effusions or pneumothorax.  - Pulmonology recommendations appreciated  - Patient on Bipap ; wean as tolerated
- Home regimen: Oxy ER 20mg BID, Oxy IR 10mg PRN   - PO OxyCONTIN (Oxy ER) 20mg q12  - PO Oxycodone IR 10mg q4 PRN moderate/severe pain (hold for hypotension, oversedation, respiratory depression)  - Educated/ counseled patient to ask for prn doses when having pain   - Bowel regimen while on opiates.
- s/p Tarlatamab on 1/29 during this admission complicated by cytokine release syndrome s/p Toci  - CXRAY 1/30 - No interval change in the multiple nodular opacities consistent with metastases.Chain sutures in the bilateral lungs from past thoracotomies.The heart is not enlarged.No effusions or pneumothorax.  - Pulmonology recommendations appreciated  - Patient on HFNC  - management as per primary team
Chest pain is likely due to worsening metastatic disease noted on CT imaging of the chest and abdomen/pelvis  - CT noted "The right cardiophrenic angle lymph node is also extending through the intercostal space into the right anterior chest wall," and "metastatic implants within the subcutaneous tissues of the anterior chest wall/both breasts."  - Imaging is negative for PE or other acute etiologies to explain patient's pain  - EKG reviewed without ischemic changes, troponin x2 without significant change  - Palliative care consulted for help with pain management  - Bowel regimen   - Narcan ordered PRN
- Pt had unwitnessed fall in her room. Denies any LOC or head trauma  - CTH neg  - No focal neurological findings  - Pt denies any pain. Has some chronic back pain, unchanged  - Consult PT   - Fall likely secondary to generalized weakness/deconditioning. Low suspicion for CNS or cardiac cause
Chest pain is likely due to worsening metastatic disease noted on CT imaging of the chest and abdomen/pelvis  - CT noted "The right cardiophrenic angle lymph node is also extending through the intercostal space into the right anterior chest wall," and "metastatic implants within the subcutaneous tissues of the anterior chest wall/both breasts."  - Imaging is negative for PE or other acute etiologies to explain patient's pain  - EKG reviewed without ischemic changes, troponin x2 without significant change  - Palliative care consulted for help with pain management  - Bowel regimen   - Narcan ordered PRN
- Home regimen: Oxy ER 20mg BID, Oxy IR 10mg PRN   - PO OxyCONTIN (Oxy ER) 20mg q12  - PO Oxycodone IR 10mg q4 PRN moderate/severe pain (hold for hypotension, oversedation, respiratory depression)  - Bowel regimen while on opiates.
- Pt had unwitnessed fall in her room. Denies any LOC or head trauma  - CTH neg  - No focal neurological findings  - Pt denies any pain. Has some chronic back pain, unchanged  - Consult PT   - Fall likely secondary to generalized weakness/deconditioning. Low suspicion for CNS or cardiac cause
- s/p Tarlatamab on 1/29 during this admission complicated by cytokine release syndrome s/p Toci  - CXRAY 1/30 - No interval change in the multiple nodular opacities consistent with metastases.Chain sutures in the bilateral lungs from past thoracotomies.The heart is not enlarged.No effusions or pneumothorax.  - Pulmonology recommendations appreciated  - Patient on Bipap ; wean as tolerated

## 2025-02-04 NOTE — PROGRESS NOTE ADULT - NUTRITIONAL ASSESSMENT
This patient has been assessed with a concern for Malnutrition and has been determined to have a diagnosis/diagnoses of Severe protein-calorie malnutrition and Underweight (BMI < 19).    This patient is being managed with:   Diet Regular-  Consistent Carbohydrate {No Snacks} (CSTCHO)  DASH/TLC {Sodium & Cholesterol Restricted} (DASH)  Supplement Feeding Modality:  Oral  Ensure Max Cans or Servings Per Day:  1       Frequency:  Two Times a day  Entered: Jan 27 2025 12:26PM  

## 2025-02-04 NOTE — PROGRESS NOTE ADULT - REASON FOR ADMISSION
Shortness of breath and chest pain

## 2025-02-04 NOTE — PROGRESS NOTE ADULT - PROBLEM SELECTOR PROBLEM 8
Hyperlipidemia
Hyperlipidemia
Need for prophylactic measure
Encounter for palliative care
Need for prophylactic measure
Hyperlipidemia

## 2025-02-04 NOTE — PROGRESS NOTE ADULT - PROBLEM SELECTOR PROBLEM 2
Acute respiratory failure with hypoxia
Chronic neoplasm-related pain
Fall
Neuroendocrine carcinoma of lung
Chronic neoplasm-related pain
Neoplasm related pain
Fall
Acute respiratory failure with hypoxia
Chronic neoplasm-related pain
Chronic neoplasm-related pain
Neoplasm related pain
Acute respiratory failure with hypoxia

## 2025-02-04 NOTE — PROGRESS NOTE ADULT - CONVERSATION DETAILS
Daughter Rachel states has been self-monitoring patient's oxygen levels with home pulse oximetry. She expresses concern regarding patient's intermittent tachycardia and inquiring about role for metoprolol. Reviewed that focus at this time is on patient's clinical symptoms for comfort and as such would recommend     She also notes that patient becomes dyspneic on attempts to be off bipap. Daughter Rachel states has been self-monitoring patient's oxygen levels with home pulse oximetry. She expresses concern regarding patient's intermittent tachycardia which she was concerned was causing patient to be uncomfortable and inquiring about role for metoprolol. Reviewed that focus at this time is on patient's clinical symptoms for comfort and as such would recommend prn doses of dilaudid as tachycardia likely caused by her discomfort.     Daughter also concerned about patient's nutritional status as patient unable to eat/drink while on Bipap and inquiring about role for IV fluids. She also notes that patient becomes dyspneic on attempts to be off bipap. Discussed recommendation for optimization of symptoms with liberation from Bipap to allow patient have eat/drink. However, daughter feels Bipap helps patient more compared to HFNC as she states patient is a "mouth breather". Daughter wishes to continue with Bipap at this time.     Daughter agreed for adjustments in optimization of patient's medications for symptoms with Dilaudid.     Emotional support provided

## 2025-02-04 NOTE — PROGRESS NOTE ADULT - NS ATTEST RISK PROBLEM GEN_ALL_CORE FT
1. Number and complexity of problems addressed for this patient:    1.1 Moderate (At least 1)  [ ] 1 or more chronic illnesses with exacerbation, progression, or side effects of treatment  [ ] 2 or more stable chronic illnesses  [ ] 1 undiagnosed new problem with uncertain prognosis  [ ] 1 acute illness with systemic symptoms  [ ] 1 acute complicated injury  1.2 High (At least 1)   [ x] 1 or more chronic illnesses with severe exacerbation, progression, or side effects of treatment  [x ] 1 acute or chronic illnesses or injuries that may pose a threat to life or bodily function    2. Amount and/or Complexity of Data that was Reviewed and Analyzed for this case:       Moderate (1 out of 3)       High (2 out of 3)  2.1. (Any combination of 3 of the following)   [x ] Prior External notes were reviewed  [ x] Each test result was reviewed (see "LABS" and "RADIOLOGY & ADDITIONAL STUDIES" above)  [ ] The following tests were ordered and/or reviewed (Only count 1 point for ordering or reviewing a unique test):  	[ ]CBC  	[ ] Chemistry   	[ ] Imaging   	[ ] Other:   [x ] Assessment requiring an independent historian   		Name of historian and relationship: Bedside RN and family   2.2  [ ] Personally review and interpretation of  image or testing   2.3  [x ] Discussion of management or test interpretation with external physician/other qualified health care professional\appropriate source (not separately reported)    3. Risk of Complications and/or Morbidity or Mortality of for this Patient’s Management:  3.1 Moderate risk of morbidity from additional diagnostic testing or treatment (At least 1):   [ ] Prescription drug management  [ ] Decision regarding minor surgery, treatment, or procedure with identified patient or procedure risk factors  [ ] Decision regarding elective major surgery, treatment, or procedure without identified patient or procedure risk factors   [ ] Diagnosis or treatment significantly limited by social determinants of health   [ ] Other:   3.2 High risk of morbidity from additional diagnostic testing or treatment (At least 1):   [ ] Drug therapy requiring intensive monitoring for toxicity   [ ] Decision regarding elective major surgery, treatment, or procedure with identified patient or procedure risk factors   [ ] Decision regarding emergency major surgery, treatment, or procedure   [ ] Decision regarding hospitalization or escalation of hospital-level of care  [x ] Decision to de-escalate care because of poor prognosis   [ ] Decision to proceed or not with artificial nutrition   [x ] Parenteral controlled substance  [ ] Other: safety/toxicity monitoring of intravenous opiates and/or benzodiazepines in end stage disease process as patient is high risk due to advanced malignancy     [x ] The patient is receiving IV and has required  escalation for uncontrolled symptoms.  [ ] To taper and monitor for emergence of symptoms.  [ ] Symptoms controlled with present regimen.

## 2025-02-04 NOTE — PROGRESS NOTE ADULT - SUBJECTIVE AND OBJECTIVE BOX
Ameya Nunes MD  Academic Hospitalist  Pager [phone]  Email: [email or web address]  Available on [employer] Teams        PROGRESS NOTE:     Patient is a 64y old  Female who presents with a chief complaint of Shortness of breath and chest pain (04 Feb 2025 15:43)      SUBJECTIVE / OVERNIGHT EVENTS:  Patient seen and examined this morning. Patient care being transitioned to hospice. Still on bipap, though attempting to wean with escalation of dilaudid.   ADDITIONAL REVIEW OF SYSTEMS:    MEDICATIONS  (STANDING):  chlorhexidine 2% Cloths 1 Application(s) Topical daily  dextrose 5%. 1000 milliLiter(s) (50 mL/Hr) IV Continuous <Continuous>  dextrose 5%. 1000 milliLiter(s) (100 mL/Hr) IV Continuous <Continuous>  glucagon  Injectable 1 milliGRAM(s) IntraMuscular once  HYDROmorphone  Injectable 0.5 milliGRAM(s) IV Push every 6 hours  influenza  Vaccine (HIGH DOSE) 0.5 milliLiter(s) IntraMuscular once  levalbuterol Inhalation 0.63 milliGRAM(s) Inhalation every 6 hours  methylPREDNISolone sodium succinate Injectable 30 milliGRAM(s) IV Push once  nystatin    Suspension 851244 Unit(s) Oral every 6 hours  piperacillin/tazobactam IVPB.. 3.375 Gram(s) IV Intermittent every 8 hours  sodium chloride 0.9%. 1000 milliLiter(s) (100 mL/Hr) IV Continuous <Continuous>  trimethoprim  160 mG/sulfamethoxazole 800 mG 1 Tablet(s) Oral <User Schedule>    MEDICATIONS  (PRN):  bisacodyl Suppository 10 milliGRAM(s) Rectal daily PRN Constipation  HYDROmorphone  Injectable 0.5 milliGRAM(s) IV Push every 2 hours PRN pain/dyspnea  LORazepam   Injectable 0.25 milliGRAM(s) IV Push every 2 hours PRN Anxiety/agitation/ refractory dyspnea  metoclopramide Injectable 10 milliGRAM(s) IV Push every 8 hours PRN Nausea/vomiting  naloxone Injectable 0.1 milliGRAM(s) IV Push every 3 minutes PRN For ANY of the following changes in patient status:  A. RR LESS THAN 10 breaths per minute, B. Oxygen saturation LESS THAN 90%, C. Sedation score of 6      CAPILLARY BLOOD GLUCOSE        I&O's Summary    03 Feb 2025 07:01  -  04 Feb 2025 07:00  --------------------------------------------------------  IN: 0 mL / OUT: 300 mL / NET: -300 mL        PHYSICAL EXAM:  Vital Signs Last 24 Hrs  T(C): --  T(F): --  HR: 90 (04 Feb 2025 11:17) (82 - 98)  BP: --  BP(mean): --  RR: --  SpO2: 96% (04 Feb 2025 11:17) (96% - 98%)    Parameters below as of 04 Feb 2025 09:52  Patient On (Oxygen Delivery Method): BiPAP/CPAP        CONSTITUTIONAL: sickly appearing, in bed, under distress at times  RESPIRATORY: poor inspiratory effort, tachypneic (though seemed better after opioids)  CARDIOVASCULAR: No visible JVD, No lower extremity edema; S1S2  ABDOMEN: Not guarding, does not appear distended, BS+  MUSCLOSKELETAL: no clubbing or cyanosis of digits; no joint swelling   PSYCH: lethargic    LABS:                        11.0   18.11 )-----------( 128      ( 03 Feb 2025 04:54 )             34.3     02-03    145  |  108[H]  |  28[H]  ----------------------------<  176[H]  3.6   |  22  |  0.57    Ca    8.1[L]      03 Feb 2025 04:54  Phos  4.0     02-03  Mg     2.60     02-03    TPro  4.4[L]  /  Alb  2.7[L]  /  TBili  0.9  /  DBili  x   /  AST  34[H]  /  ALT  23  /  AlkPhos  124[H]  02-03          Urinalysis Basic - ( 03 Feb 2025 04:54 )    Color: x / Appearance: x / SG: x / pH: x  Gluc: 176 mg/dL / Ketone: x  / Bili: x / Urobili: x   Blood: x / Protein: x / Nitrite: x   Leuk Esterase: x / RBC: x / WBC x   Sq Epi: x / Non Sq Epi: x / Bacteria: x          RADIOLOGY & ADDITIONAL TESTS:  Results Reviewed:   Imaging Personally Reviewed:  Electrocardiogram Personally Reviewed:    COORDINATION OF CARE:  Care Discussed with Consultants/Other Providers [Y/N]: Case discussed during interdisciplinary rounds with social work and case management. Discussed with Dr. Cantrell.  Prior or Outpatient Records Reviewed [Y/N]:

## 2025-02-04 NOTE — PROGRESS NOTE ADULT - NS PRO AD PATIENT TYPE ON CHART
Health Care Proxy (HCP)/Do Not Resuscitate (DNR)/Medical Orders for Life-Sustaining Treatment (MOLST)
Do Not Resuscitate (DNR)/Medical Orders for Life-Sustaining Treatment (MOLST)

## 2025-02-04 NOTE — PROGRESS NOTE ADULT - PROBLEM SELECTOR PLAN 5
Case reviewed with primary team  Thank you for allowing us to participate in your patient's care. Please page [phone] for any questions/concerns.
Thrush noted on exam. Patient states it has been present for the past 2 months  - Start nystatin swish and spit q6h
Case reviewed with primary team  Thank you for allowing us to participate in your patient's care. Please page [phone] for any questions/concerns.
, although patient was drinking a frappuccino at the time  - Check A1c in AM  - Hyperglycemia likely also due to steroids  - Moderate ISS qac; low dose ISS qHS  - Holding home metformin  - May need to be started on basal/bolus insulin if FS remain elevated  - Consistent carb diet
Thrush noted on exam. Patient states it has been present for the past 2 months  - Start nystatin swish and spit q6h
, although patient was drinking a frappuccino at the time  - Check A1c in AM  - Hyperglycemia likely also due to steroids  - Moderate ISS qac; low dose ISS qHS  - Holding home metformin  - May need to be started on basal/bolus insulin if FS remain elevated  - Consistent carb diet
, although patient was drinking a frappuccino at the time  - Check A1c in AM  - Hyperglycemia likely also due to steroids  - Moderate ISS qac; low dose ISS qHS  - Holding home metformin  - May need to be started on basal/bolus insulin if FS remain elevated  - Consistent carb diet
- MOLST completed by primary team to reflect: DNR/DNI. Trial of NIV. No feeding tube.   - Primary HCP:   Hebert Slater ([phone]); Alternate HCP: Daughter Rachel Meehan ([phone])  - 1/28 - Patient shares she is interested in pursuing DMT- plan for inpatient DMT this admission  - 1/31- Daughter Rachel aware that pt had complications from treatment and had rapid responses. Family are NOT ready for comfort care still maintaining hope that she can recover from her current clinical status.  Emotional support provided  > Continue medical management
Nutrition recommendations appreciated.
- last bowel movement on 1/30  - Dulcolax Supp PRN ; please administer dulcolax today as daughter reports patient uncomfortable from constipation

## 2025-02-04 NOTE — PROGRESS NOTE ADULT - PROBLEM SELECTOR PLAN 7
- MOLST completed by primary team to reflect: DNR/DNI. Trial of NIV. No feeding tube.   - Primary HCP:   Hebert Slater ([phone]); Alternate HCP: Daughter Rachel Meehan ([phone])  - 2/3 - Patient now comfort measures only with no further diagnostic workup.   See GOC note  16 minutes spent on advanced care planning/ goals of care - MOLST completed by primary team to reflect: DNR/DNI. Trial of NIV. No feeding tube.   - Primary HCP:   Hebert Slater ([phone]); Alternate HCP: Daughter Rachel Meehan ([phone])  - 2/3 - Patient now comfort measures only with no further diagnostic workup.

## 2025-02-04 NOTE — PROGRESS NOTE ADULT - PROBLEM SELECTOR PROBLEM 3
High serum lactate
Severe protein-calorie malnutrition
Neuroendocrine carcinoma of lung
Atrial fibrillation with RVR
Fall
Severe protein-calorie malnutrition
Neoplasm related pain
Neuroendocrine carcinoma of lung
Atrial fibrillation with RVR
Atrial fibrillation with RVR
Neoplasm related pain
Atrial fibrillation with RVR
Atrial fibrillation with RVR
Neoplasm related pain

## 2025-02-04 NOTE — PROGRESS NOTE ADULT - NS PRO AD PATIENT TYPE
Health Care Proxy (HCP)/Do Not Resuscitate (DNR)/Medical Orders for Life-Sustaining Treatment (MOLST)
Health Care Proxy (HCP)/Do Not Resuscitate (DNR)/Medical Orders for Life-Sustaining Treatment (MOLST)
Do Not Resuscitate (DNR)/Medical Orders for Life-Sustaining Treatment (MOLST)
Health Care Proxy (HCP)/Do Not Resuscitate (DNR)/Medical Orders for Life-Sustaining Treatment (MOLST)

## 2025-02-04 NOTE — PROGRESS NOTE ADULT - PROBLEM SELECTOR PLAN 4
- IV Dilaudid 0.2mg q8 ATC   - Liberate to IV Dilaudid 0.5mg q2 PRN pain/dyspnea   - Start IV Ativan 0.25mg q2 PRN refractory dyspnea/ anxiety/agitation - Increase to IV Dilaudid 0.5mg q6 ATC   - IV Dilaudid 0.5mg q2 PRN pain/dyspnea   - IV Ativan 0.25mg q2 PRN refractory dyspnea/ anxiety/agitation

## 2025-02-04 NOTE — CHART NOTE - NSCHARTNOTEFT_GEN_A_CORE
NUTRITION FOLLOW UP NOTE    Patient is seen for a follow-up nutrition assessment for severe malnutritoin.    SOURCE: [X] Other (Chart Review)    Medical Course: Per chart review, patient is a 64y Female with PMH lung neuroendocrine tumor with pulmonary, bone, and brain metastases, papillary thyroid carcinoma s/p partial thyroidectomy, HTN, HLD who presented to [facility] with worsening SOB and chest pain, likely due to worsening metastatic disease.    Diet Order: Regular: Consistent Carbohydrate {No Snacks} (CSTCHO)  DASH/TLC {Sodium & Cholesterol Restricted} (DASH)  Ensure Max 2x daily      Nutrition Course:  - Per chart review, patient s/p multiple RRTs (1/30, 1/31, 2/2, 2/2) for hypoxia, abnormal respiratory rate, abnormal heart rate. Palliative care met with patient/family to discuss GOC, determined DNR/DNI with comfort care measures. As such, no aggressive nutrition interventions warranted at this time. RD remains available, please consult as needed     PO Intake per RN flowsheet: 0-25%  GI Distress: No report of nausea/vomiting/diarrhea/constipation.   Bowel Movement: 1/30/25 per RN flowsheet. Not noted to be on a bowel regimen.       Pertinent Medications: MEDICATIONS  (STANDING):  chlorhexidine 2% Cloths 1 Application(s) Topical daily  dextrose 5%. 1000 milliLiter(s) (50 mL/Hr) IV Continuous <Continuous>  dextrose 5%. 1000 milliLiter(s) (100 mL/Hr) IV Continuous <Continuous>  glucagon  Injectable 1 milliGRAM(s) IntraMuscular once  HYDROmorphone  Injectable 0.5 milliGRAM(s) IV Push every 6 hours  influenza  Vaccine (HIGH DOSE) 0.5 milliLiter(s) IntraMuscular once  levalbuterol Inhalation 0.63 milliGRAM(s) Inhalation every 6 hours  methylPREDNISolone sodium succinate Injectable 40 milliGRAM(s) IV Push every 12 hours  nystatin    Suspension 645588 Unit(s) Oral every 6 hours  piperacillin/tazobactam IVPB.. 3.375 Gram(s) IV Intermittent every 8 hours  sodium chloride 0.9%. 1000 milliLiter(s) (100 mL/Hr) IV Continuous <Continuous>  trimethoprim  160 mG/sulfamethoxazole 800 mG 1 Tablet(s) Oral <User Schedule>    MEDICATIONS  (PRN):  bisacodyl Suppository 10 milliGRAM(s) Rectal daily PRN Constipation  HYDROmorphone  Injectable 0.5 milliGRAM(s) IV Push every 2 hours PRN pain/dyspnea  LORazepam   Injectable 0.25 milliGRAM(s) IV Push every 2 hours PRN Anxiety/agitation/ refractory dyspnea  metoclopramide Injectable 10 milliGRAM(s) IV Push every 8 hours PRN Nausea/vomiting  naloxone Injectable 0.1 milliGRAM(s) IV Push every 3 minutes PRN For ANY of the following changes in patient status:  A. RR LESS THAN 10 breaths per minute, B. Oxygen saturation LESS THAN 90%, C. Sedation score of 6      Pertinent Labs: 02-03 Na145 mmol/L Glu 176 mg/dL[H] K+ 3.6 mmol/L Cr  0.57 mg/dL BUN 28 mg/dL[H] 02-03 Phos 4.0 mg/dL 02-03 Alb 2.7 g/dL[L]    Vitamin D 25-hydroxy 19.3ng/mL (1-28-25), deficient    A1C with Estimated Average Glucose Result: 7.7 % (01-27-25 @ 05:44)    CAPILLARY BLOOD GLUCOSE  POCT Blood Glucose.: 146 mg/dL (03 Feb 2025 12:19)      Anthropometrics  Weights per RN flowsheet: 40.8kg (1/26)  Weight Assessment: No weight changes since previous assessment    Physical Assessment, per flowsheets:  Edema: 1+ (generalized)  Pressure Injury: None    Estimated Needs:   [X] No change since previous assessment  Weight Used: Ideal Body Weight 96lb / 43.5kg  Energy Needs: 1305-1522kcal (based on 30-35kcal/kg)  Protein Needs: 52.2-60.9gms (based on 1.2-1.4gms/kg)    Previous Nutrition Diagnosis: [X] Severe Malnutrition    Nutrition Diagnosis is [X] ongoing    New Nutrition Diagnosis: [X] not applicable     Education: [X] Not appropriate secondary to comfort care status    Interventions:   - GOC discussion per chart: DNR/DNI, comfort care measures  --> No aggressive nutrition interventions warranted at this time. Please feeds at medical team's discretion.    RD remains available, please consult as needed.    Aneta Price MS RDN CDN  Available on [employer] Teams (Preferred) or Pager #[phone]

## 2025-02-04 NOTE — PROGRESS NOTE ADULT - PROBLEM SELECTOR PROBLEM 1
Neuroendocrine carcinoma of lung
Chronic neoplasm-related pain
Neuroendocrine carcinoma of lung
Neuroendocrine carcinoma of lung
Chronic neoplasm-related pain
Neuroendocrine carcinoma of lung
Neuroendocrine carcinoma of lung
Chronic neoplasm-related pain
Neuroendocrine carcinoma of lung
Neuroendocrine carcinoma of lung

## 2025-02-04 NOTE — PROGRESS NOTE ADULT - PROBLEM SELECTOR PROBLEM 5
Constipation
Encounter for palliative care
Oral thrush
Oral thrush
Drug-induced hyperglycemia
Drug-induced hyperglycemia
Oral thrush
Oral thrush
Drug-induced hyperglycemia
Oral thrush
Oral thrush
Encounter for palliative care
Severe protein-calorie malnutrition
Counseling regarding advanced care planning and goals of care

## 2025-02-05 NOTE — DISCHARGE NOTE FOR THE EXPIRED PATIENT - HOSPITAL COURSE
64 year old female Former smoker (1/2 PPD x 1 year; Quit 1982), w/ hx of HTN, HLD, papillary thyroid CA (s/p left hemithyroidectomy in 2021), pT2aN2 Stag IIIA lung neuroendocrine tumor with pulmonary, bone and brain metastases (completed 6cycles of Carbo/Etop July 2024, on Lanreotide q6w and everolimus qd, completed 5Fx WBRT 11/2024), HTN, HLD, DM presenting with worsening SOB and chest pain, likely due to worsening metastatic disease. CTA Chest w/ B/L pulmonary Mets, hepatic mets, Rt adrenal gland Mets, LE Duplex neg, on supplemental oxygen, s/p inpatient [facility] 1/29. c/b CRS requiring Toci. Complicated by acute respiratory failure 2/2 disease and CRS from [facility], s/p multiple RRT required BiPAP, further goals of care discussed and patient made DNR/DNI, placed on comfort measures.    Called to bedside to evaluate patient for decreased respirations and no heart sounds. Patient DNR/DNI on comfort measures. Patient seen and evaluated at bedside. On physical exam, patient did not respond to verbal or noxious stimuli.  No spontaneous respirations.  Absent heart and breath sounds.  Absent radial and carotid pulses.   Pupils are fixed and dilated, no corneal reflex.   Patient pronounced dead at 11:00PM on 2/4/2025. Daughter present at bedside. [facility] made aware attending Dr. Nunes notified.
